# Patient Record
Sex: FEMALE | Race: WHITE | NOT HISPANIC OR LATINO | Employment: UNEMPLOYED | URBAN - METROPOLITAN AREA
[De-identification: names, ages, dates, MRNs, and addresses within clinical notes are randomized per-mention and may not be internally consistent; named-entity substitution may affect disease eponyms.]

---

## 2018-03-08 ENCOUNTER — OFFICE VISIT CONVERTED (OUTPATIENT)
Dept: FAMILY MEDICINE CLINIC | Facility: CLINIC | Age: 23
End: 2018-03-08
Attending: NURSE PRACTITIONER

## 2018-08-30 ENCOUNTER — OFFICE VISIT CONVERTED (OUTPATIENT)
Dept: FAMILY MEDICINE CLINIC | Facility: CLINIC | Age: 23
End: 2018-08-30
Attending: NURSE PRACTITIONER

## 2018-09-11 ENCOUNTER — OFFICE VISIT CONVERTED (OUTPATIENT)
Dept: ORTHOPEDIC SURGERY | Facility: CLINIC | Age: 23
End: 2018-09-11
Attending: ORTHOPAEDIC SURGERY

## 2018-09-28 ENCOUNTER — OFFICE VISIT CONVERTED (OUTPATIENT)
Dept: FAMILY MEDICINE CLINIC | Facility: CLINIC | Age: 23
End: 2018-09-28
Attending: NURSE PRACTITIONER

## 2018-10-11 ENCOUNTER — OFFICE VISIT CONVERTED (OUTPATIENT)
Dept: ORTHOPEDIC SURGERY | Facility: CLINIC | Age: 23
End: 2018-10-11
Attending: PHYSICIAN ASSISTANT

## 2018-10-30 ENCOUNTER — OFFICE VISIT CONVERTED (OUTPATIENT)
Dept: ORTHOPEDIC SURGERY | Facility: CLINIC | Age: 23
End: 2018-10-30
Attending: PHYSICIAN ASSISTANT

## 2018-11-13 ENCOUNTER — OFFICE VISIT CONVERTED (OUTPATIENT)
Dept: FAMILY MEDICINE CLINIC | Facility: CLINIC | Age: 23
End: 2018-11-13
Attending: NURSE PRACTITIONER

## 2018-11-27 ENCOUNTER — OFFICE VISIT CONVERTED (OUTPATIENT)
Dept: ORTHOPEDIC SURGERY | Facility: CLINIC | Age: 23
End: 2018-11-27
Attending: PHYSICIAN ASSISTANT

## 2018-12-14 ENCOUNTER — OFFICE VISIT CONVERTED (OUTPATIENT)
Dept: FAMILY MEDICINE CLINIC | Facility: CLINIC | Age: 23
End: 2018-12-14
Attending: NURSE PRACTITIONER

## 2018-12-14 ENCOUNTER — CONVERSION ENCOUNTER (OUTPATIENT)
Dept: FAMILY MEDICINE CLINIC | Facility: CLINIC | Age: 23
End: 2018-12-14

## 2019-02-21 ENCOUNTER — OFFICE VISIT CONVERTED (OUTPATIENT)
Dept: FAMILY MEDICINE CLINIC | Facility: CLINIC | Age: 24
End: 2019-02-21
Attending: NURSE PRACTITIONER

## 2019-08-17 ENCOUNTER — HOSPITAL ENCOUNTER (OUTPATIENT)
Dept: LABOR AND DELIVERY | Facility: HOSPITAL | Age: 24
Discharge: HOME | End: 2019-08-17
Attending: OBSTETRICS & GYNECOLOGY

## 2019-08-23 ENCOUNTER — HOSPITAL ENCOUNTER (OUTPATIENT)
Dept: LABOR AND DELIVERY | Facility: HOSPITAL | Age: 24
Discharge: HOME | End: 2019-08-23
Attending: OBSTETRICS & GYNECOLOGY

## 2019-08-23 LAB
ALBUMIN SERPL-MCNC: 3.5 G/DL (ref 3.5–5)
ALBUMIN/GLOB SERPL: 1.3 {RATIO} (ref 1.4–2.6)
ALP SERPL-CCNC: 94 U/L (ref 42–98)
ALT SERPL-CCNC: 15 U/L (ref 10–40)
ANION GAP SERPL CALC-SCNC: 15 MMOL/L (ref 8–19)
AST SERPL-CCNC: 19 U/L (ref 15–50)
BASOPHILS # BLD AUTO: 0.02 10*3/UL (ref 0–0.2)
BASOPHILS NFR BLD AUTO: 0.2 % (ref 0–3)
BILIRUB SERPL-MCNC: 0.43 MG/DL (ref 0.2–1.3)
BUN SERPL-MCNC: 9 MG/DL (ref 5–25)
BUN/CREAT SERPL: 15 {RATIO} (ref 6–20)
CALCIUM SERPL-MCNC: 9.3 MG/DL (ref 8.7–10.4)
CHLORIDE SERPL-SCNC: 103 MMOL/L (ref 99–111)
CONV ABS IMM GRAN: 0.06 10*3/UL (ref 0–0.2)
CONV CO2: 21 MMOL/L (ref 22–32)
CONV CREATININE URINE, RANDOM: 339.1 MG/DL (ref 10–300)
CONV IMMATURE GRAN: 0.6 % (ref 0–1.8)
CONV PROTEIN TO CREATININE RATIO (RANDOM URINE): 0.06 {RATIO} (ref 0–0.1)
CONV TOTAL PROTEIN: 6.1 G/DL (ref 6.3–8.2)
CREAT UR-MCNC: 0.59 MG/DL (ref 0.5–0.9)
DEPRECATED RDW RBC AUTO: 42.4 FL (ref 36.4–46.3)
EOSINOPHIL # BLD AUTO: 0.1 10*3/UL (ref 0–0.7)
EOSINOPHIL # BLD AUTO: 0.9 % (ref 0–7)
ERYTHROCYTE [DISTWIDTH] IN BLOOD BY AUTOMATED COUNT: 13 % (ref 11.7–14.4)
GFR SERPLBLD BASED ON 1.73 SQ M-ARVRAT: >60 ML/MIN/{1.73_M2}
GLOBULIN UR ELPH-MCNC: 2.6 G/DL (ref 2–3.5)
GLUCOSE SERPL-MCNC: 78 MG/DL (ref 65–99)
HCT VFR BLD AUTO: 34.9 % (ref 37–47)
HGB BLD-MCNC: 11.8 G/DL (ref 12–16)
LYMPHOCYTES # BLD AUTO: 2.05 10*3/UL (ref 1–5)
LYMPHOCYTES NFR BLD AUTO: 19.2 % (ref 20–45)
MCH RBC QN AUTO: 30.2 PG (ref 27–31)
MCHC RBC AUTO-ENTMCNC: 33.8 G/DL (ref 33–37)
MCV RBC AUTO: 89.3 FL (ref 81–99)
MONOCYTES # BLD AUTO: 0.67 10*3/UL (ref 0.2–1.2)
MONOCYTES NFR BLD AUTO: 6.3 % (ref 3–10)
NEUTROPHILS # BLD AUTO: 7.75 10*3/UL (ref 2–8)
NEUTROPHILS NFR BLD AUTO: 72.8 % (ref 30–85)
NRBC CBCN: 0 % (ref 0–0.7)
OSMOLALITY SERPL CALC.SUM OF ELEC: 278 MOSM/KG (ref 273–304)
PLATELET # BLD AUTO: 192 10*3/UL (ref 130–400)
PMV BLD AUTO: 10.4 FL (ref 9.4–12.3)
POTASSIUM SERPL-SCNC: 3.7 MMOL/L (ref 3.5–5.3)
PROT UR-MCNC: 20 MG/DL
RBC # BLD AUTO: 3.91 10*6/UL (ref 4.2–5.4)
SODIUM SERPL-SCNC: 135 MMOL/L (ref 135–147)
WBC # BLD AUTO: 10.65 10*3/UL (ref 4.8–10.8)

## 2019-09-16 ENCOUNTER — HOSPITAL ENCOUNTER (OUTPATIENT)
Dept: ULTRASOUND IMAGING | Facility: HOSPITAL | Age: 24
Discharge: HOME | End: 2019-09-16
Attending: OBSTETRICS & GYNECOLOGY

## 2020-01-30 ENCOUNTER — HOSPITAL ENCOUNTER (OUTPATIENT)
Dept: FAMILY MEDICINE CLINIC | Facility: CLINIC | Age: 25
Discharge: HOME | End: 2020-01-30
Attending: NURSE PRACTITIONER

## 2020-01-30 ENCOUNTER — OFFICE VISIT CONVERTED (OUTPATIENT)
Dept: FAMILY MEDICINE CLINIC | Facility: CLINIC | Age: 25
End: 2020-01-30
Attending: NURSE PRACTITIONER

## 2020-02-01 LAB — BACTERIA SPEC AEROBE CULT: NORMAL

## 2020-05-08 ENCOUNTER — OFFICE VISIT CONVERTED (OUTPATIENT)
Dept: FAMILY MEDICINE CLINIC | Facility: CLINIC | Age: 25
End: 2020-05-08
Attending: NURSE PRACTITIONER

## 2020-05-08 ENCOUNTER — CONVERSION ENCOUNTER (OUTPATIENT)
Dept: FAMILY MEDICINE CLINIC | Facility: CLINIC | Age: 25
End: 2020-05-08

## 2020-06-09 ENCOUNTER — OFFICE VISIT CONVERTED (OUTPATIENT)
Dept: ORTHOPEDIC SURGERY | Facility: CLINIC | Age: 25
End: 2020-06-09
Attending: ORTHOPAEDIC SURGERY

## 2020-06-27 ENCOUNTER — HOSPITAL ENCOUNTER (OUTPATIENT)
Dept: URGENT CARE | Facility: CLINIC | Age: 25
Discharge: HOME | End: 2020-06-27
Attending: NURSE PRACTITIONER

## 2020-09-15 ENCOUNTER — HOSPITAL ENCOUNTER (OUTPATIENT)
Dept: PERIOP | Facility: HOSPITAL | Age: 25
Setting detail: HOSPITAL OUTPATIENT SURGERY
Discharge: HOME | End: 2020-09-16
Attending: OBSTETRICS & GYNECOLOGY

## 2020-09-15 LAB
ABO GROUP BLD: NORMAL
ALBUMIN SERPL-MCNC: 4.8 G/DL (ref 3.5–5)
ALBUMIN/GLOB SERPL: 2 {RATIO} (ref 1.4–2.6)
ALP SERPL-CCNC: 70 U/L (ref 42–98)
ALT SERPL-CCNC: 16 U/L (ref 10–40)
ANION GAP SERPL CALC-SCNC: 19 MMOL/L (ref 8–19)
APPEARANCE UR: CLEAR
AST SERPL-CCNC: 18 U/L (ref 15–50)
BASOPHILS # BLD AUTO: 0.04 10*3/UL (ref 0–0.2)
BASOPHILS NFR BLD AUTO: 0.3 % (ref 0–3)
BILIRUB SERPL-MCNC: 0.9 MG/DL (ref 0.2–1.3)
BILIRUB UR QL: NEGATIVE
BLD GP AB SCN SERPL QL: NORMAL
BUN SERPL-MCNC: 11 MG/DL (ref 5–25)
BUN/CREAT SERPL: 19 {RATIO} (ref 6–20)
CALCIUM SERPL-MCNC: 9.8 MG/DL (ref 8.7–10.4)
CHLORIDE SERPL-SCNC: 102 MMOL/L (ref 99–111)
COLOR UR: YELLOW
CONV ABD CONTROL: NORMAL
CONV ABS IMM GRAN: 0.04 10*3/UL (ref 0–0.2)
CONV BACTERIA: NEGATIVE
CONV CO2: 22 MMOL/L (ref 22–32)
CONV COLLECTION SOURCE (UA): ABNORMAL
CONV HYALINE CASTS IN URINE MICRO: ABNORMAL /[LPF]
CONV IMMATURE GRAN: 0.3 % (ref 0–1.8)
CONV TOTAL PROTEIN: 7.2 G/DL (ref 6.3–8.2)
CONV UROBILINOGEN IN URINE BY AUTOMATED TEST STRIP: 1 {EHRLICHU}/DL (ref 0.1–1)
CREAT UR-MCNC: 0.58 MG/DL (ref 0.5–0.9)
DEPRECATED RDW RBC AUTO: 38 FL (ref 36.4–46.3)
EOSINOPHIL # BLD AUTO: 0.01 10*3/UL (ref 0–0.7)
EOSINOPHIL # BLD AUTO: 0.1 % (ref 0–7)
ERYTHROCYTE [DISTWIDTH] IN BLOOD BY AUTOMATED COUNT: 12.1 % (ref 11.7–14.4)
GFR SERPLBLD BASED ON 1.73 SQ M-ARVRAT: >60 ML/MIN/{1.73_M2}
GLOBULIN UR ELPH-MCNC: 2.4 G/DL (ref 2–3.5)
GLUCOSE SERPL-MCNC: 92 MG/DL (ref 65–99)
GLUCOSE UR QL: NEGATIVE MG/DL
HCG INTACT+B SERPL-ACNC: 681 M[IU]/ML (ref 0–5)
HCT VFR BLD AUTO: 36.4 % (ref 37–47)
HGB BLD-MCNC: 12.6 G/DL (ref 12–16)
HGB UR QL STRIP: ABNORMAL
KETONES UR QL STRIP: >80 MG/DL
LEUKOCYTE ESTERASE UR QL STRIP: NEGATIVE
LIPASE SERPL-CCNC: 14 U/L (ref 5–51)
LYMPHOCYTES # BLD AUTO: 2.5 10*3/UL (ref 1–5)
LYMPHOCYTES NFR BLD AUTO: 21.8 % (ref 20–45)
MCH RBC QN AUTO: 29.9 PG (ref 27–31)
MCHC RBC AUTO-ENTMCNC: 34.6 G/DL (ref 33–37)
MCV RBC AUTO: 86.5 FL (ref 81–99)
MONOCYTES # BLD AUTO: 0.88 10*3/UL (ref 0.2–1.2)
MONOCYTES NFR BLD AUTO: 7.7 % (ref 3–10)
NEUTROPHILS # BLD AUTO: 8 10*3/UL (ref 2–8)
NEUTROPHILS NFR BLD AUTO: 69.8 % (ref 30–85)
NITRITE UR QL STRIP: NEGATIVE
NRBC CBCN: 0 % (ref 0–0.7)
OSMOLALITY SERPL CALC.SUM OF ELEC: 287 MOSM/KG (ref 273–304)
PH UR STRIP.AUTO: 5.5 [PH] (ref 5–8)
PLATELET # BLD AUTO: 198 10*3/UL (ref 130–400)
PMV BLD AUTO: 10.6 FL (ref 9.4–12.3)
POTASSIUM SERPL-SCNC: 3.6 MMOL/L (ref 3.5–5.3)
PROT UR QL: NEGATIVE MG/DL
RBC # BLD AUTO: 4.21 10*6/UL (ref 4.2–5.4)
RBC #/AREA URNS HPF: ABNORMAL /[HPF]
RH BLD: NORMAL
SARS-COV-2 RNA SPEC QL NAA+PROBE: NOT DETECTED
SODIUM SERPL-SCNC: 139 MMOL/L (ref 135–147)
SP GR UR: 1.03 (ref 1–1.03)
WBC # BLD AUTO: 11.47 10*3/UL (ref 4.8–10.8)
WBC #/AREA URNS HPF: ABNORMAL /[HPF]

## 2021-03-19 LAB
EXTERNAL CHLAMYDIA SCREEN: NEGATIVE
EXTERNAL GONORRHEA SCREEN: NEGATIVE
EXTERNAL HEPATITIS B SURFACE ANTIGEN: NEGATIVE
EXTERNAL HEPATITIS C AB: NORMAL
EXTERNAL RUBELLA QUALITATIVE: NORMAL
EXTERNAL SYPHILIS ANTIBODY: NORMAL
EXTERNAL SYPHILIS RPR SCREEN: NEGATIVE
EXTERNAL VDRL: NORMAL
HIV1 P24 AG SERPL QL IA: NEGATIVE
VZV IGG SER QL: NORMAL

## 2021-04-02 ENCOUNTER — HOSPITAL ENCOUNTER (OUTPATIENT)
Dept: GENERAL RADIOLOGY | Facility: HOSPITAL | Age: 26
Discharge: HOME | End: 2021-04-02
Attending: ADVANCED PRACTICE MIDWIFE

## 2021-05-10 NOTE — H&P
History and Physical      Patient Name: Raysa Ellis   Patient ID: [id number]   Sex: Female   YOB: 1995    Primary Care Provider: Jeanie IRVIN    Visit Date: 2020    Provider: Lester Hu MD   Location: [facility]   Location Address: [address] [place] [address]  [id number]   Location Phone: [phone]          Chief Complaint  · Left Wrist Pain      History Of Present Illness  Raysa Ellis is a 24 year old /White female who presents today to [facility].      She's here for evaluation of left wrist pain, numbness, and tingling. Patient complains of palpable mass on dorsal aspect of left wrist. Patient states increased symptoms with holding 8 month old baby. Denies bracing. Patient has a history of left volar and dorsal wrist ganglion cyst excisions in 2018.       Past Medical History  ***No Significant Medical History; Migraine headache         Past Surgical History  I have had no surgeries         Medication List  Tylenol Arthritis Pain 650 mg oral tablet extended release; Voltaren 1 % topical gel         Allergy List  NO KNOWN DRUG ALLERGIES         Family Medical History  *No Known Family History         Reproductive History   1 Para 1 0 0 0       Social History  Alcohol (Never); Alcohol Use (Never); Claustophobic (Unknown); lives with spouse; Recreational Drug Use (Never); Single.; Tobacco (Current every day); Working         Immunizations  Name Date Admin   Influenza Refused         Review of Systems  · Constitutional  o Denies  o : fever, chills, weight loss  · Cardiovascular  o Denies  o : chest pain, shortness of breath  · Gastrointestinal  o Denies  o : liver disease, heartburn, nausea, blood in stools  · Genitourinary  o Denies  o : painful urination, blood in urine  · Integument  o Denies  o : rash, itching  · Neurologic  o Denies  o : headache, weakness, loss of consciousness  · Musculoskeletal  o Denies  o : painful, swollen  "joints  · Psychiatric  o Denies  o : drug/alcohol addiction, anxiety, depression      Vitals  Date Time BP Position Site L\R Cuff Size HR RR TEMP (F) WT  HT  BMI kg/m2 BSA m2 O2 Sat HC       06/09/2020 01:50 PM      81 - R   153lbs 0oz 5'  4\" 26.26 1.77 98 %          Physical Examination  · Constitutional  o Appearance  o : well developed, well-nourished, no obvious deformities present  · Head and Face  o Head  o :   § Inspection  § : normocephalic  o Face  o :   § Inspection  § : no facial lesions  · Eyes  o Conjunctivae  o : conjunctivae normal  o Sclerae  o : sclerae white  · Ears, Nose, Mouth and Throat  o Ears  o :   § External Ears  § : appearance within normal limits  § Hearing  § : intact  o Nose  o :   § External Nose  § : appearance normal  · Neck  o Inspection/Palpation  o : normal appearance  o Range of Motion  o : full range of motion  · Respiratory  o Respiratory Effort  o : breathing unlabored  o Inspection of Chest  o : normal appearance  o Auscultation of Lungs  o : no audible wheezing or rales  · Cardiovascular  o Heart  o : regular rate  · Gastrointestinal  o Abdominal Examination  o : soft and non-tender  · Skin and Subcutaneous Tissue  o General Inspection  o : intact, no rashes  · Psychiatric  o General  o : Alert and oriented x3  o Judgement and Insight  o : judgment and insight intact  o Mood and Affect  o : mood normal, affect appropriate  · Left Wrist  o Inspection  o : Fullness in dorsal wrist. Full digit ROM. Full wrist ROM. Neurovascularly intact. Sensation grossly intact. Pulses normal.           Assessment  · Left wrist pain     719.43/M25.532  Left dorsal wrist ganglion cyst      Plan  · Medications  o Medications have been Reconciled  o Transition of Care or Provider Policy  · Instructions  o Reviewed the patient's Past Medical, Social, and Family history as well as the ROS at today's visit, no changes.  o Call or return if worsening symptoms.  o Follow Up PRN.  o The above service " was scribed by Fela Wells on my behalf and I attest to the accuracy of the note. mc  o The plan is bracing and a prescription of Voltaren Gel. If failing to improve, may further discuss excision of left wrist mass.             Electronically Signed by: Sofi Wells - , Other -Author on June 9, 2020 03:07:35 PM  Electronically Co-signed by: Lester Hu MD -Reviewer on June 11, 2020 02:44:36 PM

## 2021-05-13 NOTE — PROGRESS NOTES
Progress Note      Patient Name: Raysa Ellis   Patient ID: [id number]   Sex: Female   YOB: 1995    Primary Care Provider: Jeanie IRVIN    Visit Date: May 8, 2020    Provider: ALBARO Berg   Location: [facility]   Location Address: [address] [place] [address], [address]  [id number]   Location Phone: [phone]          Chief Complaint  · Low back pain      History Of Present Illness  Raysa Ellis is a 24 year old /White female who presents for evaluation and treatment of:      She is a 24-year-old female who comes in today with complaints of low back pain.  Waxes and wanes in intensity.  She states that she is been having issues with her low back for the last several years no known injury.  She states she has been taken Tylenol Motrin as needed.  She states that she feels like the Tylenol has been working better.  She denies any weakness in her lower extremities.  Denies any bowel bladder incontinence.  She has never had any imaging done on her lower back.       Past Medical History  Disease Name Date Onset Notes   ***No Significant Medical History --  --    Migraine headache --  --          Past Surgical History  Procedure Name Date Notes   I have had no surgeries --  --          Allergy List  Allergen Name Date Reaction Notes   NO KNOWN DRUG ALLERGIES --  --  --        Allergies Reconciled  Family Medical History  Disease Name Relative/Age Notes   *No Known Family History  --          Reproductive History  Menstrual   Last Menstrual Period: 2019   Pregnancy Summary   Total Pregnancies: 1 Full Term: 1 Premature: 0   Ab Induced: 0 Ab Spontaneous: 0 Ectopics: 0   Multiples: 0 Livin         Social History  Finding Status Start/Stop Quantity Notes   Alcohol Never --/-- --  --    Alcohol Use Never --/-- --  does not drink   Claustophobic Unknown --/-- --  yes   lives with spouse --  --/-- --  --    Recreational Drug Use Never --/-- --  no   Tobacco  "Current every day --/-- 0.5 PPD current every day smoker, 0.5 pack per day, smoked 5 years  1pack every 2-3 days   Working --  --/-- --  --          Immunizations  NameDate Admin Mfg Trade Name Lot Number Route Inj VIS Given VIS Publication   InfluenzaRefused 01/30/2020 NE Not Entered  NE NE     Comments:          Review of Systems  · Constitutional  o Denies  o : fever, fatigue, weight loss, weight gain  · Cardiovascular  o Denies  o : lower extremity edema, claudication, chest pressure, palpitations  · Respiratory  o Denies  o : shortness of breath, wheezing, cough, hemoptysis, dyspnea on exertion  · Gastrointestinal  o Denies  o : nausea, vomiting, diarrhea, constipation, abdominal pain  · Genitourinary  o Denies  o : incontinence, urinary hesitancy, decreased stream  · Musculoskeletal  o Admits  o : muscle pain, back pain  o Denies  o : joint pain, joint swelling, muscle cramps      Vitals  Date Time BP Position Site L\R Cuff Size HR RR TEMP (F) WT  HT  BMI kg/m2 BSA m2 O2 Sat        05/08/2020 01:14 /64 Sitting    104 - R  98.2 151lbs 0oz 5'  4\" 25.92 1.76 99 %          Physical Examination  · Constitutional  o Appearance  o : well-nourished, well developed, in no acute distress  · Eyes  o Conjunctivae  o : conjunctivae normal, no exudates present  o Sclerae  o : sclerae white  o Pupils and Irises  o : pupils equal and round, and reactive to light and accomodation bilaterally  o Eyelids/Ocular Adnexae  o : extra ocular movements intact  · Respiratory  o Respiratory Effort  o : breathing unlabored, no accessory muscle use  o Inspection of Chest  o : normal appearance, no retractions  o Auscultation of Lungs  o : normal breath sounds bilaterally  · Cardiovascular  o Heart  o :   § Auscultation of Heart  § : regular rate and rhythm, no murmurs, gallops or rubs  o Peripheral Vascular System  o :   § Extremities  § : no edema  · Musculoskeletal  o Spine  o :   § Inspection/Palpation  § : Mild midline " tenderness in the L-spine. No scoliosis or kyphosis present  · Neurologic  o Mental Status Examination  o :   § Orientation  § : alert and oriented x3  § Speech/Language  § : normal speech pattern  o Gait and Station  o : normal gait, able to stand without difficulty              Assessment  · Lumbago     724.2/M54.5  Will treat with Tylenol arthritis, and have patient follow-up with chiropractic care to see if it does not help her back more long-term. Discussed return precautions. Patient verbalized understanding is agreeable treatment plan. Will get x-ray of back to rule out any acute bony abnormality.    Problems Reconciled  Plan  · Orders  o Lumbar Spine Complete [facility] ([id number]) - 724.2/M54.5 - 05/08/2020  o ACO-39: Current medications updated and reviewed () - - 05/08/2020  o Chiropractor Consultation (CHIRO) - 724.2/M54.5 - 05/08/2020   Dr. Delgado in [place]  · Medications  o Tylenol Arthritis Pain 650 mg oral tablet extended release   SIG: take 2 tablets (1,300 mg) by oral route every 8 hours swallowing whole with water. Do not break, crush, dissolve   DISP: (90) tablets with 1 refills  Prescribed on 05/08/2020     o Medications have been Reconciled  o Transition of Care or Provider Policy  · Instructions  o Take all medications as prescribed/directed.  o Patient was educated/instructed on their diagnosis, treatment and medications prior to discharge from the clinic today.  o Call the office with any concerns or questions.  · Disposition  o Call or Return if symptoms worsen or persist.  o follow up as needed  o call the office with any questions or concerns            Electronically Signed by: ALBARO Berg -Author on May 8, 2020 01:29:52 PM

## 2021-05-15 VITALS
HEIGHT: 63 IN | OXYGEN SATURATION: 96 % | BODY MASS INDEX: 24.7 KG/M2 | WEIGHT: 139.37 LBS | DIASTOLIC BLOOD PRESSURE: 70 MMHG | TEMPERATURE: 102.3 F | SYSTOLIC BLOOD PRESSURE: 120 MMHG | HEART RATE: 250 BPM

## 2021-05-15 VITALS — WEIGHT: 153 LBS | HEART RATE: 81 BPM | BODY MASS INDEX: 26.12 KG/M2 | OXYGEN SATURATION: 98 % | HEIGHT: 64 IN

## 2021-05-15 VITALS
TEMPERATURE: 98.2 F | DIASTOLIC BLOOD PRESSURE: 64 MMHG | HEART RATE: 104 BPM | BODY MASS INDEX: 25.78 KG/M2 | WEIGHT: 151 LBS | OXYGEN SATURATION: 99 % | SYSTOLIC BLOOD PRESSURE: 102 MMHG | HEIGHT: 64 IN

## 2021-05-15 VITALS
HEART RATE: 105 BPM | WEIGHT: 140 LBS | HEIGHT: 63 IN | SYSTOLIC BLOOD PRESSURE: 112 MMHG | OXYGEN SATURATION: 98 % | DIASTOLIC BLOOD PRESSURE: 54 MMHG | TEMPERATURE: 99 F | BODY MASS INDEX: 24.8 KG/M2

## 2021-05-15 VITALS
HEIGHT: 63 IN | BODY MASS INDEX: 24.63 KG/M2 | SYSTOLIC BLOOD PRESSURE: 120 MMHG | WEIGHT: 139 LBS | OXYGEN SATURATION: 98 % | HEART RATE: 90 BPM | TEMPERATURE: 98.9 F | DIASTOLIC BLOOD PRESSURE: 72 MMHG

## 2021-05-16 VITALS — OXYGEN SATURATION: 98 % | WEIGHT: 136.12 LBS | HEART RATE: 107 BPM | HEIGHT: 63 IN | BODY MASS INDEX: 24.12 KG/M2

## 2021-05-16 VITALS
OXYGEN SATURATION: 98 % | DIASTOLIC BLOOD PRESSURE: 68 MMHG | SYSTOLIC BLOOD PRESSURE: 108 MMHG | TEMPERATURE: 97.9 F | HEIGHT: 63 IN | HEART RATE: 94 BPM | BODY MASS INDEX: 24.27 KG/M2 | WEIGHT: 137 LBS

## 2021-05-16 VITALS — OXYGEN SATURATION: 98 % | BODY MASS INDEX: 24.65 KG/M2 | HEIGHT: 63 IN | HEART RATE: 92 BPM | WEIGHT: 139.12 LBS

## 2021-05-16 VITALS — BODY MASS INDEX: 24.14 KG/M2 | WEIGHT: 136.25 LBS | HEIGHT: 63 IN | OXYGEN SATURATION: 98 % | HEART RATE: 88 BPM

## 2021-05-16 VITALS
HEIGHT: 63 IN | BODY MASS INDEX: 24.32 KG/M2 | HEART RATE: 76 BPM | DIASTOLIC BLOOD PRESSURE: 70 MMHG | SYSTOLIC BLOOD PRESSURE: 122 MMHG | OXYGEN SATURATION: 97 % | TEMPERATURE: 98.3 F | WEIGHT: 137.25 LBS

## 2021-05-16 VITALS
HEART RATE: 94 BPM | SYSTOLIC BLOOD PRESSURE: 117 MMHG | HEIGHT: 64 IN | WEIGHT: 142.5 LBS | DIASTOLIC BLOOD PRESSURE: 61 MMHG | BODY MASS INDEX: 24.33 KG/M2 | OXYGEN SATURATION: 100 % | TEMPERATURE: 99.1 F

## 2021-05-16 VITALS
BODY MASS INDEX: 24.34 KG/M2 | TEMPERATURE: 98.5 F | DIASTOLIC BLOOD PRESSURE: 68 MMHG | SYSTOLIC BLOOD PRESSURE: 110 MMHG | OXYGEN SATURATION: 96 % | HEART RATE: 88 BPM | HEIGHT: 63 IN | WEIGHT: 137.37 LBS

## 2021-05-16 VITALS — BODY MASS INDEX: 24.27 KG/M2 | HEIGHT: 63 IN | WEIGHT: 137 LBS

## 2021-09-14 PROBLEM — Z34.80 SUPERVISION OF OTHER NORMAL PREGNANCY, ANTEPARTUM: Status: ACTIVE | Noted: 2021-09-14

## 2021-09-17 ENCOUNTER — ROUTINE PRENATAL (OUTPATIENT)
Dept: OBSTETRICS AND GYNECOLOGY | Facility: CLINIC | Age: 26
End: 2021-09-17

## 2021-09-17 VITALS — DIASTOLIC BLOOD PRESSURE: 70 MMHG | SYSTOLIC BLOOD PRESSURE: 112 MMHG | WEIGHT: 150 LBS | BODY MASS INDEX: 25.75 KG/M2

## 2021-09-17 DIAGNOSIS — Z34.80 SUPERVISION OF OTHER NORMAL PREGNANCY: Primary | ICD-10-CM

## 2021-09-17 PROBLEM — Z98.891 HX OF CESAREAN SECTION: Status: ACTIVE | Noted: 2021-09-17

## 2021-09-17 LAB
GLUCOSE UR STRIP-MCNC: ABNORMAL MG/DL
PROT UR STRIP-MCNC: NEGATIVE MG/DL

## 2021-09-17 PROCEDURE — 99212 OFFICE O/P EST SF 10 MIN: CPT | Performed by: STUDENT IN AN ORGANIZED HEALTH CARE EDUCATION/TRAINING PROGRAM

## 2021-09-17 RX ORDER — PRENATAL VIT/IRON FUM/FOLIC AC 27MG-0.8MG
1 TABLET ORAL DAILY
COMMUNITY
End: 2022-01-05

## 2021-09-17 NOTE — PROGRESS NOTES
OB FOLLOW UP  Complaint   Chief Complaint   Patient presents with   • Routine Prenatal Visit            Raysa Ellis is a 26 y.o.  33w3d patient being seen today for her obstetrical follow up visit. Patient denies decreased fetal movement, contractions, loss of fluid or vaginal bleeding.  No other acute complaints.    Her prenatal care is complicated by (and status) :    Patient Active Problem List   Diagnosis   • Supervision of other normal pregnancy, antepartum   • Hx of  section       All other systems reviewed and are negative.     The additional following portions of the patient's history were reviewed and updated as appropriate: allergies, current medications, past family history, past medical history, past social history, past surgical history and problem list.      EXAM:     Vital signs: /70   Wt 68 kg (150 lb)   LMP 2021   BMI 25.75 kg/m²   Appearance/psychiatric: To be in no distress  Constitutional: The patient is well nourished.  Cardiovascular: She does not have edema.  Respiratory: Respiratory effort is normal.  Gastrointestinal: Abdomen is soft, gravid, nontender, no rashes, heart tones are present, fundal height is size equals dates    Pelvic Exam: No    Urine glucose/protein: See prenatal flowsheet       Assessment and Plan    Problem List Items Addressed This Visit     None      Visit Diagnoses     Supervision of other normal pregnancy    -  Primary    Relevant Orders    POC Urinalysis Dipstick (Completed)        Impression  1. Pregnancy at 33w3d  2. History of  section.   3. Fetal status reassuring.   4. Activity and Exercise discussed.    Plan  1.  Labor precautions discussed with patient.  Including vaginal bleeding, loss of fluid, contractions and decreased fetal movement.   2.  Patient desires to .  Recommend induction between 39 and 40 weeks  3.  Follow-up in 2 weeks        Eliot Gupta MD  2021

## 2021-10-07 ENCOUNTER — TELEPHONE (OUTPATIENT)
Dept: OBSTETRICS AND GYNECOLOGY | Facility: CLINIC | Age: 26
End: 2021-10-07

## 2021-10-07 ENCOUNTER — ROUTINE PRENATAL (OUTPATIENT)
Dept: OBSTETRICS AND GYNECOLOGY | Facility: CLINIC | Age: 26
End: 2021-10-07

## 2021-10-07 VITALS — SYSTOLIC BLOOD PRESSURE: 114 MMHG | DIASTOLIC BLOOD PRESSURE: 78 MMHG | BODY MASS INDEX: 26.61 KG/M2 | WEIGHT: 155 LBS

## 2021-10-07 DIAGNOSIS — I83.93 ASYMPTOMATIC VARICOSE VEINS OF BOTH LOWER EXTREMITIES: ICD-10-CM

## 2021-10-07 DIAGNOSIS — Z98.891 HX OF CESAREAN SECTION: ICD-10-CM

## 2021-10-07 DIAGNOSIS — Z34.80 SUPERVISION OF OTHER NORMAL PREGNANCY, ANTEPARTUM: Primary | ICD-10-CM

## 2021-10-07 LAB
GLUCOSE UR STRIP-MCNC: NEGATIVE MG/DL
PROT UR STRIP-MCNC: NEGATIVE MG/DL

## 2021-10-07 PROCEDURE — 99213 OFFICE O/P EST LOW 20 MIN: CPT | Performed by: STUDENT IN AN ORGANIZED HEALTH CARE EDUCATION/TRAINING PROGRAM

## 2021-10-07 PROCEDURE — 87081 CULTURE SCREEN ONLY: CPT | Performed by: STUDENT IN AN ORGANIZED HEALTH CARE EDUCATION/TRAINING PROGRAM

## 2021-10-07 NOTE — PROGRESS NOTES
OB FOLLOW UP  Complaint   Chief Complaint   Patient presents with   • Routine Prenatal Visit            Raysa Ellis is a 26 y.o.  36w2d patient being seen today for her obstetrical follow up visit. Patient denies decreased fetal movement, contractions, loss of fluid or vaginal bleeding.  No other acute complaints.  Has been wearing compression stockings for varicose veins with some improvement.  Denies any pain in legs.  She desires a trial of labor after .  Indication for  previously was fetal intolerance.    Her prenatal care is complicated by (and status) :    Patient Active Problem List   Diagnosis   • Supervision of other normal pregnancy, antepartum   • Hx of  section   • Asymptomatic varicose veins of both lower extremities       All other systems reviewed and are negative.     The additional following portions of the patient's history were reviewed and updated as appropriate: allergies, current medications, past family history, past medical history, past social history, past surgical history and problem list.      EXAM:     Vital signs: /78   Wt 70.3 kg (155 lb)   LMP 2021   BMI 26.61 kg/m²   Appearance/psychiatric: To be in no distress  Constitutional: The patient is well nourished.  Cardiovascular: She does not have edema.  Respiratory: Respiratory effort is normal.  Gastrointestinal: Abdomen is soft, gravid, nontender, no rashes, heart tones are present, fundal height is size equals dates    Pelvic Exam: Yes.  Presentation: cephalic. Dilation: 1cm. Effacement: 60%. Station: -3.    Urine glucose/protein: See prenatal flowsheet       Assessment and Plan    Problem List Items Addressed This Visit        Cardiac and Vasculature    Asymptomatic varicose veins of both lower extremities       Gravid and     Hx of  section    Supervision of other normal pregnancy, antepartum - Primary    Overview     Blood type O+  PNL normal  PAP NILM 3/19  GCCT  neg    Genetic testing:    Quad screen negative    Vaccinations  COVID:  Does not plan to get vaccinated.   Influenza:     24-28 weeks  1hr GCT failed, passed 3 hour  CBC 12.6/38.1  Tdap: Has the Rx for this, plans to do weekend of       28 weeks beyond  GBS    Ultrasound:  Dating:   Anatomy:   Follow up:       PLAN  2021 Desires to .  Recommend induction at 39 to 40 weeks.  Patient previously counseled to  continue to discuss at next appointment.                     Relevant Orders    POC Urinalysis Dipstick (Completed)    Group B Streptococcus Culture - Swab, Vaginal/Rectum          Impression  1. Pregnancy at 36w2d  2. Fetal status reassuring.   3. Activity and Exercise discussed.  4. Varicose veins  5. History of  section    Plan  1.  GBS collected today  2.  Induction of labor at 39 6, with desire for trial labor after   3.  Continue with compressions hoses to help with varicose veins.  4. Follow-up 1 week      Patient was counseled to the following pregnancy precautions:  • Decreased fetal movement, if concern for decreased fetal movement please perform fetal kick counts you are looking for 10 movements in 2 hours.  If concern for fetal movement and not meeting that criteria, please present to triage for evaluation.  • Contractions occurring every 5 minutes for over an hour, lasting 30 to 60 seconds and progressively causing more discomfort, please seek medical attention to rule out labor  • If you believe that your water is broken, place a sanitary pad.  If pad fills in short period of time i.e. less than 5 minutes, take off pad placed another pad.  If this is saturated please present for rule out rupture of membranes  • Vaginal bleeding can be normal in pregnancy, this usually takes a form of spotting.  If having heavier bleeding like a menstrual period please present for evaluation; especially in light of severe abdominal pain this could represent a placental  abruption.  • Keep all scheduled appointments as recommended.        Eliot Gupta MD  10/07/2021

## 2021-10-07 NOTE — TELEPHONE ENCOUNTER
Patient has called back after we had her scheduled for IOL after  w/Dr. Gupta on 21.  Patient states she cannot do it on 21 and is requesting 21.  Spoke w/ Dr. WINTERS and he asked that she have her next appt w/ physician on call on 21 to talk about this/patient does not have a VM account set up./mary

## 2021-10-09 LAB — BACTERIA SPEC AEROBE CULT: ABNORMAL

## 2021-10-14 ENCOUNTER — TELEPHONE (OUTPATIENT)
Dept: OBSTETRICS AND GYNECOLOGY | Facility: CLINIC | Age: 26
End: 2021-10-14

## 2021-10-15 ENCOUNTER — TELEPHONE (OUTPATIENT)
Dept: OBSTETRICS AND GYNECOLOGY | Facility: CLINIC | Age: 26
End: 2021-10-15

## 2021-10-15 ENCOUNTER — ROUTINE PRENATAL (OUTPATIENT)
Dept: OBSTETRICS AND GYNECOLOGY | Facility: CLINIC | Age: 26
End: 2021-10-15

## 2021-10-15 VITALS — DIASTOLIC BLOOD PRESSURE: 74 MMHG | BODY MASS INDEX: 26.43 KG/M2 | SYSTOLIC BLOOD PRESSURE: 110 MMHG | WEIGHT: 154 LBS

## 2021-10-15 DIAGNOSIS — Z34.80 SUPERVISION OF OTHER NORMAL PREGNANCY, ANTEPARTUM: Primary | ICD-10-CM

## 2021-10-15 DIAGNOSIS — Z98.891 HX OF CESAREAN SECTION: ICD-10-CM

## 2021-10-15 DIAGNOSIS — I83.93 ASYMPTOMATIC VARICOSE VEINS OF BOTH LOWER EXTREMITIES: ICD-10-CM

## 2021-10-15 LAB
GLUCOSE UR STRIP-MCNC: NEGATIVE MG/DL
PROT UR STRIP-MCNC: NEGATIVE MG/DL

## 2021-10-15 PROCEDURE — 99212 OFFICE O/P EST SF 10 MIN: CPT | Performed by: ADVANCED PRACTICE MIDWIFE

## 2021-10-15 NOTE — PROGRESS NOTES
OB FOLLOW UP  CC- Here for care of pregnancy        Raysa Ellis is a 26 y.o.  37w3d patient being seen today for her obstetrical follow up visit. Patient reports no complaints.     Her prenatal care is complicated by (and status) :    Patient Active Problem List   Diagnosis   • Supervision of other normal pregnancy, antepartum   • Hx of  section   • Asymptomatic varicose veins of both lower extremities       ROS -   Patient Reports : No Problems  Patient Denies: Loss of Fluid, Vaginal Spotting and Contractions  Fetal Movement : normal  All other systems reviewed and are negative.       The additional following portions of the patient's history were reviewed and updated as appropriate: allergies, current medications, past family history, past medical history, past social history, past surgical history and problem list.    I have reviewed and agree with the HPI, ROS, and historical information as entered above. Naty Jackson CNM    /74   Wt 69.9 kg (154 lb)   LMP 2021   BMI 26.43 kg/m²       EXAM:     FHT: 150 BPM   Uterine Size: 36 cm  Pelvic Exam: Yes.  Presentation: cephalic. Dilation: 1cm. Effacement: 80. Station: -1.    Urine glucose/protein: See prenatal flowsheet       Assessment and Plan    Problem List Items Addressed This Visit        Cardiac and Vasculature    Asymptomatic varicose veins of both lower extremities       Gravid and     Supervision of other normal pregnancy, antepartum - Primary    Overview     Blood type O+  PNL normal  PAP NILM 3/19  GCCT neg    Genetic testing:    Quad screen negative    Vaccinations  COVID:  Does not plan to get vaccinated.   Influenza:     24-28 weeks  1hr GCT failed, passed 3 hour  CBC 12.6/38.1  Tdap: Has the Rx for this, plans to do weekend of       28 weeks beyond  GBS - positive 10/7/2021    Ultrasound:  Dating:   Anatomy:   Follow up:       PLAN  2021 Desires to .  Recommend induction at 39 to 40 weeks.   Patient previously counseled to  continue to discuss at next appointment.                     Relevant Orders    POC Urinalysis Dipstick (Completed)    Hx of  section          1. Pregnancy at 37w3d  2. Fetal status reassuring.   3. GBS +  4. Discussed fetal kick counts  5. Return in about 1 week (around 10/22/2021) for Next scheduled follow up.    Naty Jackson CNM  10/15/2021

## 2021-10-22 ENCOUNTER — ROUTINE PRENATAL (OUTPATIENT)
Dept: OBSTETRICS AND GYNECOLOGY | Facility: CLINIC | Age: 26
End: 2021-10-22

## 2021-10-22 ENCOUNTER — PREP FOR SURGERY (OUTPATIENT)
Dept: OTHER | Facility: HOSPITAL | Age: 26
End: 2021-10-22

## 2021-10-22 VITALS — WEIGHT: 156 LBS | DIASTOLIC BLOOD PRESSURE: 77 MMHG | BODY MASS INDEX: 26.78 KG/M2 | SYSTOLIC BLOOD PRESSURE: 112 MMHG

## 2021-10-22 DIAGNOSIS — Z01.818 PRE-OP EVALUATION: Primary | ICD-10-CM

## 2021-10-22 DIAGNOSIS — I83.93 ASYMPTOMATIC VARICOSE VEINS OF BOTH LOWER EXTREMITIES: ICD-10-CM

## 2021-10-22 DIAGNOSIS — Z34.80 SUPERVISION OF OTHER NORMAL PREGNANCY, ANTEPARTUM: ICD-10-CM

## 2021-10-22 DIAGNOSIS — Z98.891 HX OF CESAREAN SECTION: ICD-10-CM

## 2021-10-22 PROBLEM — Z3A.38 38 WEEKS GESTATION OF PREGNANCY: Status: ACTIVE | Noted: 2021-10-22

## 2021-10-22 LAB
GLUCOSE UR STRIP-MCNC: NEGATIVE MG/DL
PROT UR STRIP-MCNC: NEGATIVE MG/DL

## 2021-10-22 PROCEDURE — 99213 OFFICE O/P EST LOW 20 MIN: CPT | Performed by: OBSTETRICS & GYNECOLOGY

## 2021-10-22 NOTE — PROGRESS NOTES
OB FOLLOW UP    CC: Scheduled OB routine FU       Prenatal care complicated by:   Patient Active Problem List   Diagnosis   • Supervision of other normal pregnancy, antepartum   • Hx of  section   • Asymptomatic varicose veins of both lower extremities   • 38 weeks gestation of pregnancy       Subjective:   Patient has: No complaints, No leaking fluid, No vaginal bleeding, No contractions, Adequate FM    Objective:  Urine glucose/protein- see flow sheet      /77   Wt 70.8 kg (156 lb)   LMP 2021   BMI 26.78 kg/m²   See OB flow for LE edema, and cvx exam if applicable  FHT: 138 BPM   Uterine Size: 36.5 cm      Assessment and Plan:  Diagnoses and all orders for this visit:    1. Asymptomatic varicose veins of both lower extremities  Assessment & Plan:  Recommended ANTONIETA hose      2. Hx of  section  Assessment & Plan:  The patient desires TOLAC.  The risks, benefits, alternatives have been reviewed.  Undelivered she is scheduled for induction of labor with Dr. Horne on 2021.      3. Supervision of other normal pregnancy, antepartum  Overview:  Blood type O+  PNL normal  PAP NILM 3/19  GCCT neg    Genetic testing:    Quad screen negative    Vaccinations  COVID:  Does not plan to get vaccinated.   Influenza:     24-28 weeks  1hr GCT failed, passed 3 hour  CBC 12.6/38.1  Tdap: Has the Rx for this, plans to do weekend of       28 weeks beyond  GBS - positive 10/7/2021    Ultrasound:  Dating:   Anatomy:   Follow up:       PLAN  2021 Desires to .  Recommend induction at 39 to 40 weeks.  Patient previously counseled to  continue to discuss at next appointment.                Assessment & Plan:  Continue prenatal vitamins  Fetal kick counts  Labor warnings    Orders:  -     POC Urinalysis Dipstick        38w3d  Reassuring pregnancy progress    Counseling: OB precautions, leaking, VB, deanne frausto vs PTL/Labor  FKC    Questions answered    Return in about 1 week (around  10/29/2021).      Grupo Sousa MD  10/22/2021

## 2021-10-22 NOTE — ASSESSMENT & PLAN NOTE
The patient desires TOLAC.  The risks, benefits, alternatives have been reviewed.  Undelivered she is scheduled for induction of labor with Dr. Horne on 11/1/2021.

## 2021-10-27 ENCOUNTER — LAB (OUTPATIENT)
Dept: LAB | Facility: HOSPITAL | Age: 26
End: 2021-10-27

## 2021-10-27 PROCEDURE — 87635 SARS-COV-2 COVID-19 AMP PRB: CPT | Performed by: STUDENT IN AN ORGANIZED HEALTH CARE EDUCATION/TRAINING PROGRAM

## 2021-10-28 LAB — SARS-COV-2 N GENE RESP QL NAA+PROBE: NOT DETECTED

## 2021-10-29 ENCOUNTER — HOSPITAL ENCOUNTER (INPATIENT)
Facility: HOSPITAL | Age: 26
LOS: 1 days | Discharge: HOME | End: 2021-10-30
Attending: OBSTETRICS & GYNECOLOGY | Admitting: OBSTETRICS & GYNECOLOGY

## 2021-10-29 ENCOUNTER — ANESTHESIA (OUTPATIENT)
Dept: LABOR AND DELIVERY | Facility: HOSPITAL | Age: 26
End: 2021-10-29

## 2021-10-29 ENCOUNTER — ANESTHESIA EVENT (OUTPATIENT)
Dept: LABOR AND DELIVERY | Facility: HOSPITAL | Age: 26
End: 2021-10-29

## 2021-10-29 PROBLEM — G43.909 MIGRAINE HEADACHE: Status: ACTIVE | Noted: 2021-10-29

## 2021-10-29 PROBLEM — Z34.80 SUPERVISION OF OTHER NORMAL PREGNANCY, ANTEPARTUM: Status: RESOLVED | Noted: 2021-09-14 | Resolved: 2021-10-29

## 2021-10-29 PROBLEM — Z37.9 NORMAL LABOR: Status: ACTIVE | Noted: 2021-10-29

## 2021-10-29 PROBLEM — Z3A.39 39 WEEKS GESTATION OF PREGNANCY: Status: ACTIVE | Noted: 2021-10-22

## 2021-10-29 LAB
ABO GROUP BLD: NORMAL
ABO GROUP BLD: NORMAL
BASE EXCESS BLDCOA CALC-SCNC: -1.4 MMOL/L
BASE EXCESS BLDCOV CALC-SCNC: -0.7 MMOL/L
BASOPHILS # BLD AUTO: 0.03 10*3/MM3 (ref 0–0.2)
BASOPHILS NFR BLD AUTO: 0.3 % (ref 0–1.5)
BLD GP AB SCN SERPL QL: NEGATIVE
DEPRECATED RDW RBC AUTO: 39.1 FL (ref 37–54)
EOSINOPHIL # BLD AUTO: 0.11 10*3/MM3 (ref 0–0.4)
EOSINOPHIL NFR BLD AUTO: 1.1 % (ref 0.3–6.2)
ERYTHROCYTE [DISTWIDTH] IN BLOOD BY AUTOMATED COUNT: 12.8 % (ref 12.3–15.4)
HCO3 BLDCOA-SCNC: 24.5 MMOL/L
HCO3 BLDCOV-SCNC: 25.5 MMOL/L
HCT VFR BLD AUTO: 35.8 % (ref 34–46.6)
HGB BLD-MCNC: 12.3 G/DL (ref 12–15.9)
IMM GRANULOCYTES # BLD AUTO: 0.06 10*3/MM3 (ref 0–0.05)
IMM GRANULOCYTES NFR BLD AUTO: 0.6 % (ref 0–0.5)
LYMPHOCYTES # BLD AUTO: 2.14 10*3/MM3 (ref 0.7–3.1)
LYMPHOCYTES NFR BLD AUTO: 20.5 % (ref 19.6–45.3)
MCH RBC QN AUTO: 29.3 PG (ref 26.6–33)
MCHC RBC AUTO-ENTMCNC: 34.4 G/DL (ref 31.5–35.7)
MCV RBC AUTO: 85.2 FL (ref 79–97)
MONOCYTES # BLD AUTO: 0.72 10*3/MM3 (ref 0.1–0.9)
MONOCYTES NFR BLD AUTO: 6.9 % (ref 5–12)
NEUTROPHILS NFR BLD AUTO: 7.38 10*3/MM3 (ref 1.7–7)
NEUTROPHILS NFR BLD AUTO: 70.6 % (ref 42.7–76)
NRBC BLD AUTO-RTO: 0 /100 WBC (ref 0–0.2)
PCO2 BLDCOA: 45.6 MMHG (ref 33–49)
PCO2 BLDCOV: 47.5 MM HG (ref 28–40)
PH BLDCOA: 7.35 PH UNITS (ref 7.21–7.31)
PH BLDCOV: 7.35 PH UNITS (ref 7.31–7.37)
PLATELET # BLD AUTO: 190 10*3/MM3 (ref 140–450)
PMV BLD AUTO: 11 FL (ref 6–12)
PO2 BLDCOA: <40.5 MMHG
PO2 BLDCOV: <40.5 MM HG (ref 21–31)
RBC # BLD AUTO: 4.2 10*6/MM3 (ref 3.77–5.28)
RH BLD: POSITIVE
RH BLD: POSITIVE
T&S EXPIRATION DATE: NORMAL
WBC # BLD AUTO: 10.44 10*3/MM3 (ref 3.4–10.8)

## 2021-10-29 PROCEDURE — S0260 H&P FOR SURGERY: HCPCS | Performed by: OBSTETRICS & GYNECOLOGY

## 2021-10-29 PROCEDURE — 86900 BLOOD TYPING SEROLOGIC ABO: CPT

## 2021-10-29 PROCEDURE — 86850 RBC ANTIBODY SCREEN: CPT | Performed by: OBSTETRICS & GYNECOLOGY

## 2021-10-29 PROCEDURE — 86901 BLOOD TYPING SEROLOGIC RH(D): CPT | Performed by: OBSTETRICS & GYNECOLOGY

## 2021-10-29 PROCEDURE — 82803 BLOOD GASES ANY COMBINATION: CPT | Performed by: OBSTETRICS & GYNECOLOGY

## 2021-10-29 PROCEDURE — 25010000002 ONDANSETRON PER 1 MG: Performed by: OBSTETRICS & GYNECOLOGY

## 2021-10-29 PROCEDURE — 25010000002 FENTANYL CITRATE (PF) 50 MCG/ML SOLUTION

## 2021-10-29 PROCEDURE — 25010000002 FENTANYL CITRATE (PF) 50 MCG/ML SOLUTION: Performed by: ANESTHESIOLOGY

## 2021-10-29 PROCEDURE — 0UQMXZZ REPAIR VULVA, EXTERNAL APPROACH: ICD-10-PCS | Performed by: OBSTETRICS & GYNECOLOGY

## 2021-10-29 PROCEDURE — C1755 CATHETER, INTRASPINAL: HCPCS | Performed by: ANESTHESIOLOGY

## 2021-10-29 PROCEDURE — 86901 BLOOD TYPING SEROLOGIC RH(D): CPT

## 2021-10-29 PROCEDURE — 86900 BLOOD TYPING SEROLOGIC ABO: CPT | Performed by: OBSTETRICS & GYNECOLOGY

## 2021-10-29 PROCEDURE — 51702 INSERT TEMP BLADDER CATH: CPT

## 2021-10-29 PROCEDURE — 25010000002 ROPIVACAINE PER 1 MG

## 2021-10-29 PROCEDURE — 85025 COMPLETE CBC W/AUTO DIFF WBC: CPT | Performed by: OBSTETRICS & GYNECOLOGY

## 2021-10-29 PROCEDURE — 10H073Z INSERTION OF MONITORING ELECTRODE INTO PRODUCTS OF CONCEPTION, VIA NATURAL OR ARTIFICIAL OPENING: ICD-10-PCS | Performed by: OBSTETRICS & GYNECOLOGY

## 2021-10-29 RX ORDER — DOCUSATE SODIUM 100 MG/1
100 CAPSULE, LIQUID FILLED ORAL 2 TIMES DAILY
Status: DISCONTINUED | OUTPATIENT
Start: 2021-10-29 | End: 2021-10-30 | Stop reason: HOSPADM

## 2021-10-29 RX ORDER — ACETAMINOPHEN 325 MG/1
650 TABLET ORAL EVERY 4 HOURS PRN
Status: DISCONTINUED | OUTPATIENT
Start: 2021-10-29 | End: 2021-10-29 | Stop reason: HOSPADM

## 2021-10-29 RX ORDER — MISOPROSTOL 200 UG/1
200 TABLET ORAL
Status: COMPLETED | OUTPATIENT
Start: 2021-10-29 | End: 2021-10-29

## 2021-10-29 RX ORDER — SODIUM CHLORIDE 0.9 % (FLUSH) 0.9 %
3 SYRINGE (ML) INJECTION EVERY 12 HOURS SCHEDULED
Status: DISCONTINUED | OUTPATIENT
Start: 2021-10-29 | End: 2021-10-29 | Stop reason: HOSPADM

## 2021-10-29 RX ORDER — CEFAZOLIN SODIUM 2 G/100ML
2 INJECTION, SOLUTION INTRAVENOUS
Status: DISCONTINUED | OUTPATIENT
Start: 2021-10-29 | End: 2021-10-29 | Stop reason: HOSPADM

## 2021-10-29 RX ORDER — TRISODIUM CITRATE DIHYDRATE AND CITRIC ACID MONOHYDRATE 500; 334 MG/5ML; MG/5ML
30 SOLUTION ORAL
Status: DISCONTINUED | OUTPATIENT
Start: 2021-10-29 | End: 2021-10-29 | Stop reason: HOSPADM

## 2021-10-29 RX ORDER — OXYTOCIN-SODIUM CHLORIDE 0.9% IV SOLN 30 UNIT/500ML 30-0.9/5 UT/ML-%
125 SOLUTION INTRAVENOUS ONCE
Status: DISCONTINUED | OUTPATIENT
Start: 2021-10-29 | End: 2021-10-30 | Stop reason: HOSPADM

## 2021-10-29 RX ORDER — SODIUM CHLORIDE 0.9 % (FLUSH) 0.9 %
1-10 SYRINGE (ML) INJECTION AS NEEDED
Status: DISCONTINUED | OUTPATIENT
Start: 2021-10-29 | End: 2021-10-30 | Stop reason: HOSPADM

## 2021-10-29 RX ORDER — IBUPROFEN 600 MG/1
600 TABLET ORAL EVERY 6 HOURS PRN
Status: DISCONTINUED | OUTPATIENT
Start: 2021-10-29 | End: 2021-10-29 | Stop reason: HOSPADM

## 2021-10-29 RX ORDER — METOCLOPRAMIDE HYDROCHLORIDE 5 MG/ML
10 INJECTION INTRAMUSCULAR; INTRAVENOUS
Status: DISCONTINUED | OUTPATIENT
Start: 2021-10-29 | End: 2021-10-29 | Stop reason: HOSPADM

## 2021-10-29 RX ORDER — HYDROCODONE BITARTRATE AND ACETAMINOPHEN 5; 325 MG/1; MG/1
1 TABLET ORAL EVERY 4 HOURS PRN
Status: DISCONTINUED | OUTPATIENT
Start: 2021-10-29 | End: 2021-10-29 | Stop reason: HOSPADM

## 2021-10-29 RX ORDER — TRISODIUM CITRATE DIHYDRATE AND CITRIC ACID MONOHYDRATE 500; 334 MG/5ML; MG/5ML
30 SOLUTION ORAL ONCE AS NEEDED
Status: DISCONTINUED | OUTPATIENT
Start: 2021-10-29 | End: 2021-10-29 | Stop reason: HOSPADM

## 2021-10-29 RX ORDER — SODIUM CHLORIDE 0.9 % (FLUSH) 0.9 %
10 SYRINGE (ML) INJECTION AS NEEDED
Status: DISCONTINUED | OUTPATIENT
Start: 2021-10-29 | End: 2021-10-29 | Stop reason: HOSPADM

## 2021-10-29 RX ORDER — EPHEDRINE SULFATE 50 MG/ML
5 INJECTION, SOLUTION INTRAVENOUS
Status: DISCONTINUED | OUTPATIENT
Start: 2021-10-29 | End: 2021-10-29 | Stop reason: HOSPADM

## 2021-10-29 RX ORDER — TERBUTALINE SULFATE 1 MG/ML
0.25 INJECTION, SOLUTION SUBCUTANEOUS AS NEEDED
Status: DISCONTINUED | OUTPATIENT
Start: 2021-10-29 | End: 2021-10-29 | Stop reason: HOSPADM

## 2021-10-29 RX ORDER — LIDOCAINE HYDROCHLORIDE AND EPINEPHRINE 15; 5 MG/ML; UG/ML
INJECTION, SOLUTION EPIDURAL
Status: COMPLETED | OUTPATIENT
Start: 2021-10-29 | End: 2021-10-29

## 2021-10-29 RX ORDER — ONDANSETRON 4 MG/1
4 TABLET, FILM COATED ORAL EVERY 6 HOURS PRN
Status: DISCONTINUED | OUTPATIENT
Start: 2021-10-29 | End: 2021-10-30 | Stop reason: HOSPADM

## 2021-10-29 RX ORDER — FENTANYL CITRATE 50 UG/ML
INJECTION, SOLUTION INTRAMUSCULAR; INTRAVENOUS
Status: COMPLETED
Start: 2021-10-29 | End: 2021-10-29

## 2021-10-29 RX ORDER — SODIUM CHLORIDE, SODIUM LACTATE, POTASSIUM CHLORIDE, CALCIUM CHLORIDE 600; 310; 30; 20 MG/100ML; MG/100ML; MG/100ML; MG/100ML
125 INJECTION, SOLUTION INTRAVENOUS CONTINUOUS
Status: DISCONTINUED | OUTPATIENT
Start: 2021-10-29 | End: 2021-10-30 | Stop reason: HOSPADM

## 2021-10-29 RX ORDER — FAMOTIDINE 10 MG/ML
20 INJECTION, SOLUTION INTRAVENOUS
Status: DISCONTINUED | OUTPATIENT
Start: 2021-10-29 | End: 2021-10-29 | Stop reason: HOSPADM

## 2021-10-29 RX ORDER — FENTANYL CITRATE 50 UG/ML
INJECTION, SOLUTION INTRAMUSCULAR; INTRAVENOUS
Status: COMPLETED | OUTPATIENT
Start: 2021-10-29 | End: 2021-10-29

## 2021-10-29 RX ORDER — ONDANSETRON 4 MG/1
4 TABLET, FILM COATED ORAL EVERY 6 HOURS PRN
Status: DISCONTINUED | OUTPATIENT
Start: 2021-10-29 | End: 2021-10-29 | Stop reason: HOSPADM

## 2021-10-29 RX ORDER — PRENATAL VIT/IRON FUM/FOLIC AC 27MG-0.8MG
1 TABLET ORAL DAILY
Status: DISCONTINUED | OUTPATIENT
Start: 2021-10-29 | End: 2021-10-30 | Stop reason: HOSPADM

## 2021-10-29 RX ORDER — METHYLERGONOVINE MALEATE 0.2 MG/ML
200 INJECTION INTRAVENOUS ONCE AS NEEDED
Status: DISCONTINUED | OUTPATIENT
Start: 2021-10-29 | End: 2021-10-29 | Stop reason: HOSPADM

## 2021-10-29 RX ORDER — MORPHINE SULFATE 2 MG/ML
2 INJECTION, SOLUTION INTRAMUSCULAR; INTRAVENOUS
Status: DISCONTINUED | OUTPATIENT
Start: 2021-10-29 | End: 2021-10-29 | Stop reason: HOSPADM

## 2021-10-29 RX ORDER — MISOPROSTOL 200 UG/1
800 TABLET ORAL AS NEEDED
Status: DISCONTINUED | OUTPATIENT
Start: 2021-10-29 | End: 2021-10-29 | Stop reason: HOSPADM

## 2021-10-29 RX ORDER — HYDROCODONE BITARTRATE AND ACETAMINOPHEN 5; 325 MG/1; MG/1
2 TABLET ORAL EVERY 4 HOURS PRN
Status: DISCONTINUED | OUTPATIENT
Start: 2021-10-29 | End: 2021-10-29 | Stop reason: HOSPADM

## 2021-10-29 RX ORDER — CALCIUM CARBONATE 200(500)MG
2 TABLET,CHEWABLE ORAL 3 TIMES DAILY PRN
Status: DISCONTINUED | OUTPATIENT
Start: 2021-10-29 | End: 2021-10-30 | Stop reason: HOSPADM

## 2021-10-29 RX ORDER — OXYTOCIN-SODIUM CHLORIDE 0.9% IV SOLN 30 UNIT/500ML 30-0.9/5 UT/ML-%
125 SOLUTION INTRAVENOUS ONCE
Status: COMPLETED | OUTPATIENT
Start: 2021-10-29 | End: 2021-10-29

## 2021-10-29 RX ORDER — ONDANSETRON 2 MG/ML
4 INJECTION INTRAMUSCULAR; INTRAVENOUS EVERY 6 HOURS PRN
Status: DISCONTINUED | OUTPATIENT
Start: 2021-10-29 | End: 2021-10-29 | Stop reason: HOSPADM

## 2021-10-29 RX ORDER — CARBOPROST TROMETHAMINE 250 UG/ML
250 INJECTION, SOLUTION INTRAMUSCULAR AS NEEDED
Status: DISCONTINUED | OUTPATIENT
Start: 2021-10-29 | End: 2021-10-29 | Stop reason: HOSPADM

## 2021-10-29 RX ORDER — EPHEDRINE SULFATE 50 MG/ML
INJECTION INTRAVENOUS
Status: COMPLETED
Start: 2021-10-29 | End: 2021-10-29

## 2021-10-29 RX ORDER — IBUPROFEN 600 MG/1
600 TABLET ORAL EVERY 6 HOURS PRN
Status: DISCONTINUED | OUTPATIENT
Start: 2021-10-29 | End: 2021-10-30 | Stop reason: HOSPADM

## 2021-10-29 RX ORDER — HYDROCODONE BITARTRATE AND ACETAMINOPHEN 5; 325 MG/1; MG/1
2 TABLET ORAL EVERY 4 HOURS PRN
Status: DISCONTINUED | OUTPATIENT
Start: 2021-10-29 | End: 2021-10-30 | Stop reason: HOSPADM

## 2021-10-29 RX ORDER — LIDOCAINE HYDROCHLORIDE 10 MG/ML
5 INJECTION, SOLUTION EPIDURAL; INFILTRATION; INTRACAUDAL; PERINEURAL AS NEEDED
Status: DISCONTINUED | OUTPATIENT
Start: 2021-10-29 | End: 2021-10-29 | Stop reason: HOSPADM

## 2021-10-29 RX ORDER — BISACODYL 10 MG
10 SUPPOSITORY, RECTAL RECTAL DAILY PRN
Status: DISCONTINUED | OUTPATIENT
Start: 2021-10-30 | End: 2021-10-30 | Stop reason: HOSPADM

## 2021-10-29 RX ORDER — HYDROCODONE BITARTRATE AND ACETAMINOPHEN 5; 325 MG/1; MG/1
1 TABLET ORAL EVERY 4 HOURS PRN
Status: DISCONTINUED | OUTPATIENT
Start: 2021-10-29 | End: 2021-10-30 | Stop reason: HOSPADM

## 2021-10-29 RX ADMIN — PRENATAL WITH FERROUS FUM AND FOLIC ACID 1 TABLET: 3080; 920; 120; 400; 22; 1.84; 3; 20; 10; 1; 12; 200; 27; 25; 2 TABLET ORAL at 11:29

## 2021-10-29 RX ADMIN — LIDOCAINE HYDROCHLORIDE AND EPINEPHRINE 5 ML: 15; 5 INJECTION, SOLUTION EPIDURAL at 05:08

## 2021-10-29 RX ADMIN — Medication: at 11:28

## 2021-10-29 RX ADMIN — WITCH HAZEL 1 PAD: 500 SOLUTION RECTAL; TOPICAL at 11:28

## 2021-10-29 RX ADMIN — MISOPROSTOL 200 MCG: 200 TABLET ORAL at 11:28

## 2021-10-29 RX ADMIN — SODIUM CHLORIDE, POTASSIUM CHLORIDE, SODIUM LACTATE AND CALCIUM CHLORIDE 125 ML/HR: 600; 310; 30; 20 INJECTION, SOLUTION INTRAVENOUS at 02:45

## 2021-10-29 RX ADMIN — FENTANYL CITRATE 100 MCG: 50 INJECTION, SOLUTION INTRAMUSCULAR; INTRAVENOUS at 05:08

## 2021-10-29 RX ADMIN — MISOPROSTOL 200 MCG: 200 TABLET ORAL at 07:35

## 2021-10-29 RX ADMIN — IBUPROFEN 600 MG: 600 TABLET ORAL at 16:39

## 2021-10-29 RX ADMIN — ONDANSETRON 4 MG: 2 INJECTION INTRAMUSCULAR; INTRAVENOUS at 07:32

## 2021-10-29 RX ADMIN — DOCUSATE SODIUM 100 MG: 100 CAPSULE, LIQUID FILLED ORAL at 11:28

## 2021-10-29 RX ADMIN — DOCUSATE SODIUM 100 MG: 100 CAPSULE, LIQUID FILLED ORAL at 21:49

## 2021-10-29 RX ADMIN — HYDROCODONE BITARTRATE AND ACETAMINOPHEN 1 TABLET: 5; 325 TABLET ORAL at 16:40

## 2021-10-29 RX ADMIN — EPHEDRINE SULFATE 5 MG: 50 INJECTION, SOLUTION INTRAVENOUS at 05:00

## 2021-10-29 RX ADMIN — EPHEDRINE SULFATE 5 MG: 50 INJECTION INTRAVENOUS at 05:00

## 2021-10-29 RX ADMIN — IBUPROFEN 600 MG: 600 TABLET ORAL at 11:28

## 2021-10-29 RX ADMIN — HYDROCODONE BITARTRATE AND ACETAMINOPHEN 1 TABLET: 5; 325 TABLET ORAL at 23:19

## 2021-10-29 RX ADMIN — OXYTOCIN 125 ML/HR: 10 INJECTION, SOLUTION INTRAMUSCULAR; INTRAVENOUS at 07:32

## 2021-10-29 RX ADMIN — SODIUM CHLORIDE, POTASSIUM CHLORIDE, SODIUM LACTATE AND CALCIUM CHLORIDE 150 ML/HR: 600; 310; 30; 20 INJECTION, SOLUTION INTRAVENOUS at 05:10

## 2021-10-29 NOTE — ANESTHESIA PREPROCEDURE EVALUATION
Anesthesia Evaluation     Patient summary reviewed and Nursing notes reviewed   no history of anesthetic complications:  NPO Solid Status: > 8 hours  NPO Liquid Status: > 2 hours           Airway   Mallampati: II  TM distance: >3 FB  Dental      Pulmonary - negative pulmonary ROS and normal exam   Cardiovascular - negative cardio ROS and normal exam  Exercise tolerance: good (4-7 METS)        Neuro/Psych- negative ROS  GI/Hepatic/Renal/Endo    (+)  GERD well controlled,      Musculoskeletal (-) negative ROS    Abdominal    Substance History - negative use     OB/GYN    (+) Pregnant,         Other - negative ROS                       Anesthesia Plan    ASA 2     regional       Anesthetic plan, all risks, benefits, and alternatives have been provided, discussed and informed consent has been obtained with: patient.

## 2021-10-29 NOTE — ANESTHESIA POSTPROCEDURE EVALUATION
Patient: Raysa Ellis    Procedure Summary     Date: 10/29/21 Room / Location:     Anesthesia Start: 0435 Anesthesia Stop: 0709    Procedure: LABOR ANALGESIA Diagnosis:     Scheduled Providers:  Provider: Eliot Hoffman MD    Anesthesia Type: regional ASA Status: 2          Anesthesia Type: regional    Vitals  Vitals Value Taken Time   /76 10/29/21 0745   Temp 36.9 °C (98.5 °F) 10/29/21 0600   Pulse 76 10/29/21 0745   Resp 20 10/29/21 0600   SpO2 99 % 10/29/21 0659   Vitals shown include unvalidated device data.        Post Anesthesia Care and Evaluation    Patient location during evaluation: bedside  Patient participation: complete - patient participated  Level of consciousness: awake and alert  Pain management: adequate  Airway patency: patent  Anesthetic complications: No anesthetic complications  PONV Status: none  Cardiovascular status: acceptable  Respiratory status: acceptable  Hydration status: acceptable  Post Neuraxial Block status: Motor and sensory function returned to baseline and No signs or symptoms of PDPH

## 2021-10-29 NOTE — ANESTHESIA PROCEDURE NOTES
Labor Epidural      Patient reassessed immediately prior to procedure    Patient location during procedure: OB  Performed By  Anesthesiologist: Eliot Hoffman MD  Preanesthetic Checklist  Completed: patient identified, IV checked, risks and benefits discussed, surgical consent, monitors and equipment checked, pre-op evaluation and timeout performed  Prep:  Pt Position:sitting  Sterile Tech:cap, gloves, sterile barrier, mask and gown  Prep:chlorhexidine gluconate and isopropyl alcohol  Monitoring:blood pressure monitoring, continuous pulse oximetry and EKG  Epidural Block Procedure:  Approach:midline  Guidance:landmark technique and palpation technique  Location:L3-L4  Needle Type:Tuohy  Needle Gauge:17 G  Loss of Resistance Medium: saline  Loss of Resistance: 7cm  Cath Depth at skin:12 cm  Paresthesia: none  Aspiration:negative  Test Dose:negative  Test dose medication: lidocaine 1.5%-EPINEPHrine 1:200,000 (XYLOCAINE W/EPI) injection, 5 mL  fentaNYL citrate (PF) (SUBLIMAZE) injection, 100 mcg  Number of Attempts: 1  Post Assessment:  Dressing:occlusive dressing applied and secured with tape  Pt Tolerance:patient tolerated the procedure well with no apparent complications  Complications:no

## 2021-10-30 VITALS
DIASTOLIC BLOOD PRESSURE: 60 MMHG | OXYGEN SATURATION: 96 % | RESPIRATION RATE: 18 BRPM | HEIGHT: 64 IN | WEIGHT: 156 LBS | TEMPERATURE: 98.2 F | HEART RATE: 56 BPM | SYSTOLIC BLOOD PRESSURE: 110 MMHG | BODY MASS INDEX: 26.63 KG/M2

## 2021-10-30 PROBLEM — Z37.9 NORMAL LABOR: Status: RESOLVED | Noted: 2021-10-29 | Resolved: 2021-10-30

## 2021-10-30 PROBLEM — O34.219 VBAC, DELIVERED: Status: ACTIVE | Noted: 2021-10-30

## 2021-10-30 LAB
HCT VFR BLD AUTO: 35 % (ref 34–46.6)
HGB BLD-MCNC: 11.5 G/DL (ref 12–15.9)

## 2021-10-30 PROCEDURE — 85018 HEMOGLOBIN: CPT | Performed by: OBSTETRICS & GYNECOLOGY

## 2021-10-30 PROCEDURE — 85014 HEMATOCRIT: CPT | Performed by: OBSTETRICS & GYNECOLOGY

## 2021-10-30 PROCEDURE — 0503F POSTPARTUM CARE VISIT: CPT | Performed by: STUDENT IN AN ORGANIZED HEALTH CARE EDUCATION/TRAINING PROGRAM

## 2021-10-30 PROCEDURE — 25010000002 PENICILLIN G POTASSIUM PER 600000 UNITS: Performed by: OBSTETRICS & GYNECOLOGY

## 2021-10-30 RX ORDER — IBUPROFEN 600 MG/1
600 TABLET ORAL EVERY 6 HOURS PRN
Qty: 60 TABLET | Refills: 1 | Status: SHIPPED | OUTPATIENT
Start: 2021-10-30 | End: 2022-01-05

## 2021-10-30 RX ADMIN — HYDROCODONE BITARTRATE AND ACETAMINOPHEN 1 TABLET: 5; 325 TABLET ORAL at 08:51

## 2021-10-30 RX ADMIN — PRENATAL WITH FERROUS FUM AND FOLIC ACID 1 TABLET: 3080; 920; 120; 400; 22; 1.84; 3; 20; 10; 1; 12; 200; 27; 25; 2 TABLET ORAL at 08:46

## 2021-10-30 RX ADMIN — DOCUSATE SODIUM 100 MG: 100 CAPSULE, LIQUID FILLED ORAL at 08:46

## 2021-10-30 RX ADMIN — IBUPROFEN 600 MG: 600 TABLET ORAL at 05:02

## 2021-11-01 ENCOUNTER — TELEPHONE (OUTPATIENT)
Dept: OBSTETRICS AND GYNECOLOGY | Facility: CLINIC | Age: 26
End: 2021-11-01

## 2021-11-01 NOTE — TELEPHONE ENCOUNTER
PATIENT CALLED THIS AM.  SHE STATES STILL HAVING A LOT OF PAIN THAT IBUPROFEN NOT HELPING. PATIENT  DELIVERED ON 10-30-21.

## 2022-01-05 PROCEDURE — U0004 COV-19 TEST NON-CDC HGH THRU: HCPCS | Performed by: FAMILY MEDICINE

## 2022-02-10 ENCOUNTER — OFFICE VISIT (OUTPATIENT)
Dept: FAMILY MEDICINE CLINIC | Facility: CLINIC | Age: 27
End: 2022-02-10

## 2022-02-10 VITALS
OXYGEN SATURATION: 98 % | DIASTOLIC BLOOD PRESSURE: 68 MMHG | BODY MASS INDEX: 22.09 KG/M2 | TEMPERATURE: 97.6 F | HEART RATE: 96 BPM | HEIGHT: 64 IN | SYSTOLIC BLOOD PRESSURE: 110 MMHG | WEIGHT: 129.4 LBS

## 2022-02-10 DIAGNOSIS — F43.21 GRIEVING: ICD-10-CM

## 2022-02-10 DIAGNOSIS — F32.2 CURRENT SEVERE EPISODE OF MAJOR DEPRESSIVE DISORDER WITHOUT PSYCHOTIC FEATURES WITHOUT PRIOR EPISODE: Primary | ICD-10-CM

## 2022-02-10 DIAGNOSIS — F41.1 GENERALIZED ANXIETY DISORDER: ICD-10-CM

## 2022-02-10 PROBLEM — O34.219 VBAC, DELIVERED: Status: RESOLVED | Noted: 2021-10-30 | Resolved: 2022-02-10

## 2022-02-10 PROBLEM — Z3A.39 39 WEEKS GESTATION OF PREGNANCY: Status: RESOLVED | Noted: 2021-10-22 | Resolved: 2022-02-10

## 2022-02-10 PROCEDURE — 99214 OFFICE O/P EST MOD 30 MIN: CPT | Performed by: NURSE PRACTITIONER

## 2022-02-10 RX ORDER — HYDROXYZINE HYDROCHLORIDE 25 MG/1
25 TABLET, FILM COATED ORAL EVERY 6 HOURS PRN
Qty: 90 TABLET | Refills: 1 | Status: SHIPPED | OUTPATIENT
Start: 2022-02-10 | End: 2022-04-13

## 2022-02-10 RX ORDER — SERTRALINE HYDROCHLORIDE 25 MG/1
50 TABLET, FILM COATED ORAL DAILY
Qty: 60 TABLET | Refills: 2 | Status: SHIPPED | OUTPATIENT
Start: 2022-02-10 | End: 2022-04-13

## 2022-02-10 NOTE — PROGRESS NOTES
Chief Complaint  Depression and Anxiety    Subjective          Raysa Ellis presents to [facility]  History of Present Illness  Presents today for an acute visit for depression and anxiety.  Last month on January 23, 2022 she lost her 3-month-old baby due to SIDS.  She states she has a history of depression anxiety since the death of her baby boy.  Anxiety and depression has significantly increased.  She has family support.  Has a 9-year-old boy and a 2-year-old girl.  They are her motivation to get up each day.  She was previously on Lexapro 20 mg a couple years ago.  She states the medication did not work well.    PHQ-9 Depression Screening  Little interest or pleasure in doing things? 3   Feeling down, depressed, or hopeless? 3   Trouble falling or staying asleep, or sleeping too much? 3   Feeling tired or having little energy? 2   Poor appetite or overeating? 2   Feeling bad about yourself - or that you are a failure or have let yourself or your family down? 2   Trouble concentrating on things, such as reading the newspaper or watching television? 3   Moving or speaking so slowly that other people could have noticed? Or the opposite - being so fidgety or restless that you have been moving around a lot more than usual? 0   Thoughts that you would be better off dead, or of hurting yourself in some way? 3   PHQ-9 Total Score 21   If you checked off any problems, how difficult have these problems made it for you to do your work, take care of things at home, or get along with other people? Extremely dIfficult         CESAR - 7 Anxiety    Date data was collected: 2/10/2022    Over the last 2 weeks, how often have you been bothered by any of the following problems?    1. Feeling nervous, anxious or on edge: Nearly every day = 3   2. Not being able to stop or control worrying Nearly every day = 3   3. Worrying too much about different things: Nearly every day = 3   4. Trouble relaxing:  "Nearly every day = 3   5. Being so restless that it is hard to sit still: Nearly every day = 3   6. Becoming easily annoyed or irritable: Nearly every day = 3   7. Feeling afraid as if something awful might happen: Nearly every day = 3    Total Score 21     If you checked off any problems, how difficult have these problems made it for you to do your work, take care of things at home or get along with other people? Extremely difficult    ______________________________________________________________________  CESAR-7 SCORING CARD FOR SEVERITY DETERMINATION    Scoring -- add up all answers  Not at all =0; Serveral Days = 1; More than half the days - 2; Nearly every day =3      Interpretation of Total Score  Total Score Anxiety Severity   0-5 Mild   6-10 Moderate   11-15 Moderately Severe   15-21 Severe     Social History     Socioeconomic History   • Marital status: Single   Tobacco Use   • Smoking status: Former Smoker     Packs/day: 0.50     Years: 5.00     Pack years: 2.50     Quit date: 2018     Years since quittin.1   • Smokeless tobacco: Never Used   Vaping Use   • Vaping Use: Never used   Substance and Sexual Activity   • Alcohol use: No   • Drug use: No   • Sexual activity: Yes     Partners: Male         Objective   Vital Signs:   /68   Pulse 96   Temp 97.6 °F (36.4 °C)   Ht 162.6 cm (64\")   Wt 58.7 kg (129 lb 6.4 oz)   SpO2 98%   BMI 22.21 kg/m²     Physical Exam  Vitals reviewed.   Constitutional:       Appearance: Normal appearance. She is well-developed.   HENT:      Head: Normocephalic and atraumatic.      Right Ear: External ear normal.      Left Ear: External ear normal.      Mouth/Throat:      Pharynx: No oropharyngeal exudate.   Eyes:      Conjunctiva/sclera: Conjunctivae normal.      Pupils: Pupils are equal, round, and reactive to light.   Cardiovascular:      Rate and Rhythm: Normal rate and regular rhythm.      Heart sounds: No murmur heard.  No friction rub. No gallop.    Pulmonary: "      Effort: Pulmonary effort is normal.      Breath sounds: Normal breath sounds. No wheezing or rhonchi.   Abdominal:      General: Bowel sounds are normal. There is no distension.      Palpations: Abdomen is soft.      Tenderness: There is no abdominal tenderness.   Skin:     General: Skin is warm and dry.   Neurological:      Mental Status: She is alert and oriented to person, place, and time.      Cranial Nerves: No cranial nerve deficit.   Psychiatric:         Mood and Affect: Affect normal. Mood is depressed.         Behavior: Behavior normal.         Thought Content: Thought content is not paranoid or delusional. Thought content does not include homicidal or suicidal plan.         Judgment: Judgment normal.        Result Review :                 Assessment and Plan    Diagnoses and all orders for this visit:    1. Current severe episode of major depressive disorder without psychotic features without prior episode (HCC) (Primary)  Assessment & Plan:  Patient's depression is single episode and is severe without psychosis. Their depression is currently active and the condition is newly identified. This will be reassessed in 4 weeks. F/U as described:patient was prescribed an antidepressant medicine.    Sertraline 25 mg daily x1 week then increase to 50 mg daily    Orders:  -     sertraline (Zoloft) 25 MG tablet; Take 2 tablets by mouth Daily.  Dispense: 60 tablet; Refill: 2    2. Generalized anxiety disorder  Assessment & Plan:  Psychological condition is newly identified.  Regular aerobic exercise.  Start hydroxyzine 25 mg 1 to 2 tablets 4 times daily as needed  Psychological condition  will be reassessed in 4 weeks.    Orders:  -     hydrOXYzine (ATARAX) 25 MG tablet; Take 1 tablet by mouth Every 6 (Six) Hours As Needed for Itching or Anxiety. 1-2 tablets 4 times a day as needed.  Dispense: 90 tablet; Refill: 1    3. Grieving  I've explained to her that drugs of the SSRI class can have side effects such as  weight gain, sexual dysfunction, insomnia, headache, nausea. These medications are generally effective at alleviating symptoms of anxiety and/or depression. Let me know if significant side effects do occur.      Follow Up   Return in about 4 weeks (around 3/10/2022), or if symptoms worsen or fail to improve, for Next scheduled follow up.  Patient was given instructions and counseling regarding her condition or for health maintenance advice. Please see specific information pulled into the AVS if appropriate.

## 2022-02-10 NOTE — ASSESSMENT & PLAN NOTE
Psychological condition is newly identified.  Regular aerobic exercise.  Start hydroxyzine 25 mg 1 to 2 tablets 4 times daily as needed  Psychological condition  will be reassessed in 4 weeks.

## 2022-02-10 NOTE — ASSESSMENT & PLAN NOTE
Patient's depression is single episode and is severe without psychosis. Their depression is currently active and the condition is newly identified. This will be reassessed in 4 weeks. F/U as described:patient was prescribed an antidepressant medicine.    Sertraline 25 mg daily x1 week then increase to 50 mg daily

## 2022-02-25 ENCOUNTER — HOSPITAL ENCOUNTER (EMERGENCY)
Facility: HOSPITAL | Age: 27
Discharge: LEFT BEFORE DISCHARGE | End: 2022-02-25
Attending: EMERGENCY MEDICINE

## 2022-02-25 VITALS
OXYGEN SATURATION: 100 % | DIASTOLIC BLOOD PRESSURE: 63 MMHG | BODY MASS INDEX: 21.68 KG/M2 | HEIGHT: 64 IN | RESPIRATION RATE: 16 BRPM | TEMPERATURE: 98.3 F | HEART RATE: 84 BPM | SYSTOLIC BLOOD PRESSURE: 107 MMHG | WEIGHT: 126.98 LBS

## 2022-02-25 LAB
ALBUMIN SERPL-MCNC: 4.9 G/DL (ref 3.5–5.2)
ALBUMIN/GLOB SERPL: 2.5 G/DL
ALP SERPL-CCNC: 76 U/L (ref 39–117)
ALT SERPL W P-5'-P-CCNC: 18 U/L (ref 1–33)
ANION GAP SERPL CALCULATED.3IONS-SCNC: 10.7 MMOL/L (ref 5–15)
AST SERPL-CCNC: 17 U/L (ref 1–32)
BASOPHILS # BLD AUTO: 0.02 10*3/MM3 (ref 0–0.2)
BASOPHILS NFR BLD AUTO: 0.4 % (ref 0–1.5)
BILIRUB SERPL-MCNC: 0.5 MG/DL (ref 0–1.2)
BUN SERPL-MCNC: 10 MG/DL (ref 6–20)
BUN/CREAT SERPL: 15.4 (ref 7–25)
CALCIUM SPEC-SCNC: 9.4 MG/DL (ref 8.6–10.5)
CHLORIDE SERPL-SCNC: 104 MMOL/L (ref 98–107)
CO2 SERPL-SCNC: 23.3 MMOL/L (ref 22–29)
CREAT SERPL-MCNC: 0.65 MG/DL (ref 0.57–1)
DEPRECATED RDW RBC AUTO: 39.5 FL (ref 37–54)
EOSINOPHIL # BLD AUTO: 0.12 10*3/MM3 (ref 0–0.4)
EOSINOPHIL NFR BLD AUTO: 2.1 % (ref 0.3–6.2)
ERYTHROCYTE [DISTWIDTH] IN BLOOD BY AUTOMATED COUNT: 12.7 % (ref 12.3–15.4)
GFR SERPL CREATININE-BSD FRML MDRD: 109 ML/MIN/1.73
GLOBULIN UR ELPH-MCNC: 2 GM/DL
GLUCOSE SERPL-MCNC: 100 MG/DL (ref 65–99)
HCG INTACT+B SERPL-ACNC: 2659 MIU/ML
HCT VFR BLD AUTO: 37.4 % (ref 34–46.6)
HGB BLD-MCNC: 13 G/DL (ref 12–15.9)
HOLD SPECIMEN: NORMAL
HOLD SPECIMEN: NORMAL
IMM GRANULOCYTES # BLD AUTO: 0.02 10*3/MM3 (ref 0–0.05)
IMM GRANULOCYTES NFR BLD AUTO: 0.4 % (ref 0–0.5)
LYMPHOCYTES # BLD AUTO: 1.81 10*3/MM3 (ref 0.7–3.1)
LYMPHOCYTES NFR BLD AUTO: 32 % (ref 19.6–45.3)
MCH RBC QN AUTO: 29.6 PG (ref 26.6–33)
MCHC RBC AUTO-ENTMCNC: 34.8 G/DL (ref 31.5–35.7)
MCV RBC AUTO: 85.2 FL (ref 79–97)
MONOCYTES # BLD AUTO: 0.41 10*3/MM3 (ref 0.1–0.9)
MONOCYTES NFR BLD AUTO: 7.2 % (ref 5–12)
NEUTROPHILS NFR BLD AUTO: 3.28 10*3/MM3 (ref 1.7–7)
NEUTROPHILS NFR BLD AUTO: 57.9 % (ref 42.7–76)
NRBC BLD AUTO-RTO: 0 /100 WBC (ref 0–0.2)
PLATELET # BLD AUTO: 173 10*3/MM3 (ref 140–450)
PMV BLD AUTO: 10 FL (ref 6–12)
POTASSIUM SERPL-SCNC: 3.7 MMOL/L (ref 3.5–5.2)
PROT SERPL-MCNC: 6.9 G/DL (ref 6–8.5)
RBC # BLD AUTO: 4.39 10*6/MM3 (ref 3.77–5.28)
SODIUM SERPL-SCNC: 138 MMOL/L (ref 136–145)
WBC NRBC COR # BLD: 5.66 10*3/MM3 (ref 3.4–10.8)
WHOLE BLOOD HOLD SPECIMEN: NORMAL
WHOLE BLOOD HOLD SPECIMEN: NORMAL

## 2022-02-25 PROCEDURE — 85025 COMPLETE CBC W/AUTO DIFF WBC: CPT

## 2022-02-25 PROCEDURE — 80053 COMPREHEN METABOLIC PANEL: CPT | Performed by: EMERGENCY MEDICINE

## 2022-02-25 PROCEDURE — 36415 COLL VENOUS BLD VENIPUNCTURE: CPT

## 2022-02-25 PROCEDURE — 99211 OFF/OP EST MAY X REQ PHY/QHP: CPT | Performed by: EMERGENCY MEDICINE

## 2022-02-25 PROCEDURE — 84702 CHORIONIC GONADOTROPIN TEST: CPT

## 2022-02-25 RX ORDER — SODIUM CHLORIDE 0.9 % (FLUSH) 0.9 %
10 SYRINGE (ML) INJECTION AS NEEDED
Status: DISCONTINUED | OUTPATIENT
Start: 2022-02-25 | End: 2022-02-25 | Stop reason: HOSPADM

## 2022-02-28 ENCOUNTER — HOSPITAL ENCOUNTER (EMERGENCY)
Facility: HOSPITAL | Age: 27
Discharge: HOME | End: 2022-02-28
Attending: EMERGENCY MEDICINE | Admitting: EMERGENCY MEDICINE

## 2022-02-28 ENCOUNTER — APPOINTMENT (OUTPATIENT)
Dept: ULTRASOUND IMAGING | Facility: HOSPITAL | Age: 27
End: 2022-02-28

## 2022-02-28 VITALS
DIASTOLIC BLOOD PRESSURE: 60 MMHG | HEART RATE: 77 BPM | WEIGHT: 129.63 LBS | HEIGHT: 64 IN | RESPIRATION RATE: 18 BRPM | OXYGEN SATURATION: 99 % | BODY MASS INDEX: 22.13 KG/M2 | TEMPERATURE: 98.3 F | SYSTOLIC BLOOD PRESSURE: 116 MMHG

## 2022-02-28 DIAGNOSIS — O20.0 THREATENED MISCARRIAGE: Primary | ICD-10-CM

## 2022-02-28 LAB
BASOPHILS # BLD AUTO: 0.03 10*3/MM3 (ref 0–0.2)
BASOPHILS NFR BLD AUTO: 0.6 % (ref 0–1.5)
BILIRUB UR QL STRIP: NEGATIVE
C TRACH RRNA CVX QL NAA+PROBE: NOT DETECTED
CANDIDA SPECIES: NEGATIVE
CLARITY UR: CLEAR
COLOR UR: YELLOW
DEPRECATED RDW RBC AUTO: 40.3 FL (ref 37–54)
EOSINOPHIL # BLD AUTO: 0.12 10*3/MM3 (ref 0–0.4)
EOSINOPHIL NFR BLD AUTO: 2.5 % (ref 0.3–6.2)
ERYTHROCYTE [DISTWIDTH] IN BLOOD BY AUTOMATED COUNT: 12.9 % (ref 12.3–15.4)
GARDNERELLA VAGINALIS: POSITIVE
GLUCOSE UR STRIP-MCNC: NEGATIVE MG/DL
HCG INTACT+B SERPL-ACNC: 6753 MIU/ML
HCT VFR BLD AUTO: 36.6 % (ref 34–46.6)
HGB BLD-MCNC: 12.7 G/DL (ref 12–15.9)
HGB UR QL STRIP.AUTO: NEGATIVE
HOLD SPECIMEN: NORMAL
HOLD SPECIMEN: NORMAL
IMM GRANULOCYTES # BLD AUTO: 0.01 10*3/MM3 (ref 0–0.05)
IMM GRANULOCYTES NFR BLD AUTO: 0.2 % (ref 0–0.5)
KETONES UR QL STRIP: NEGATIVE
LEUKOCYTE ESTERASE UR QL STRIP.AUTO: NEGATIVE
LYMPHOCYTES # BLD AUTO: 1.53 10*3/MM3 (ref 0.7–3.1)
LYMPHOCYTES NFR BLD AUTO: 32.1 % (ref 19.6–45.3)
MCH RBC QN AUTO: 30 PG (ref 26.6–33)
MCHC RBC AUTO-ENTMCNC: 34.7 G/DL (ref 31.5–35.7)
MCV RBC AUTO: 86.5 FL (ref 79–97)
MONOCYTES # BLD AUTO: 0.35 10*3/MM3 (ref 0.1–0.9)
MONOCYTES NFR BLD AUTO: 7.3 % (ref 5–12)
N GONORRHOEA RRNA SPEC QL NAA+PROBE: NOT DETECTED
NEUTROPHILS NFR BLD AUTO: 2.73 10*3/MM3 (ref 1.7–7)
NEUTROPHILS NFR BLD AUTO: 57.3 % (ref 42.7–76)
NITRITE UR QL STRIP: NEGATIVE
NRBC BLD AUTO-RTO: 0 /100 WBC (ref 0–0.2)
PH UR STRIP.AUTO: 7.5 [PH] (ref 5–8)
PLATELET # BLD AUTO: 173 10*3/MM3 (ref 140–450)
PMV BLD AUTO: 10.1 FL (ref 6–12)
PROT UR QL STRIP: NEGATIVE
RBC # BLD AUTO: 4.23 10*6/MM3 (ref 3.77–5.28)
SP GR UR STRIP: 1.01 (ref 1–1.03)
T VAGINALIS DNA VAG QL PROBE+SIG AMP: NEGATIVE
UROBILINOGEN UR QL STRIP: NORMAL
WBC NRBC COR # BLD: 4.77 10*3/MM3 (ref 3.4–10.8)
WHOLE BLOOD HOLD SPECIMEN: NORMAL
WHOLE BLOOD HOLD SPECIMEN: NORMAL

## 2022-02-28 PROCEDURE — 87510 GARDNER VAG DNA DIR PROBE: CPT | Performed by: EMERGENCY MEDICINE

## 2022-02-28 PROCEDURE — 87591 N.GONORRHOEAE DNA AMP PROB: CPT | Performed by: EMERGENCY MEDICINE

## 2022-02-28 PROCEDURE — 76817 TRANSVAGINAL US OBSTETRIC: CPT

## 2022-02-28 PROCEDURE — 84702 CHORIONIC GONADOTROPIN TEST: CPT | Performed by: EMERGENCY MEDICINE

## 2022-02-28 PROCEDURE — 87660 TRICHOMONAS VAGIN DIR PROBE: CPT | Performed by: EMERGENCY MEDICINE

## 2022-02-28 PROCEDURE — 99283 EMERGENCY DEPT VISIT LOW MDM: CPT

## 2022-02-28 PROCEDURE — 85025 COMPLETE CBC W/AUTO DIFF WBC: CPT

## 2022-02-28 PROCEDURE — 81003 URINALYSIS AUTO W/O SCOPE: CPT | Performed by: EMERGENCY MEDICINE

## 2022-02-28 PROCEDURE — 87480 CANDIDA DNA DIR PROBE: CPT | Performed by: EMERGENCY MEDICINE

## 2022-02-28 PROCEDURE — 87491 CHLMYD TRACH DNA AMP PROBE: CPT | Performed by: EMERGENCY MEDICINE

## 2022-02-28 PROCEDURE — 36415 COLL VENOUS BLD VENIPUNCTURE: CPT

## 2022-02-28 RX ORDER — SODIUM CHLORIDE 0.9 % (FLUSH) 0.9 %
10 SYRINGE (ML) INJECTION AS NEEDED
Status: DISCONTINUED | OUTPATIENT
Start: 2022-02-28 | End: 2022-02-28 | Stop reason: HOSPADM

## 2022-03-01 DIAGNOSIS — N76.0 BV (BACTERIAL VAGINOSIS): Primary | ICD-10-CM

## 2022-03-01 DIAGNOSIS — B96.89 BV (BACTERIAL VAGINOSIS): Primary | ICD-10-CM

## 2022-03-01 RX ORDER — METRONIDAZOLE 500 MG/1
500 TABLET ORAL 2 TIMES DAILY
Qty: 14 TABLET | Refills: 0 | Status: SHIPPED | OUTPATIENT
Start: 2022-03-01 | End: 2022-03-02

## 2022-03-01 NOTE — TELEPHONE ENCOUNTER
Patient called requesting a follow up with . She states she started bleeding on Friday 02-25 like a period. She states it then turned into spotting when she wiped. She was seen in the ER yesterday for the spotting. She is currently having no pain and only spotting when she wipes. I scheduled her for a fu on Thursday with  and gave strict pain/bleeding precautions. Please advise if anything further should be done before her appt. She also tested positive for BV in the ER but was not treated. I have attached a prescription for flagyl.

## 2022-03-02 ENCOUNTER — OFFICE VISIT (OUTPATIENT)
Dept: OBSTETRICS AND GYNECOLOGY | Facility: CLINIC | Age: 27
End: 2022-03-02

## 2022-03-02 VITALS
SYSTOLIC BLOOD PRESSURE: 124 MMHG | BODY MASS INDEX: 21.68 KG/M2 | WEIGHT: 127 LBS | HEIGHT: 64 IN | HEART RATE: 102 BPM | DIASTOLIC BLOOD PRESSURE: 84 MMHG

## 2022-03-02 DIAGNOSIS — O36.80X0 ENCOUNTER TO DETERMINE FETAL VIABILITY OF PREGNANCY, SINGLE OR UNSPECIFIED FETUS: Primary | ICD-10-CM

## 2022-03-02 PROCEDURE — 99213 OFFICE O/P EST LOW 20 MIN: CPT | Performed by: STUDENT IN AN ORGANIZED HEALTH CARE EDUCATION/TRAINING PROGRAM

## 2022-03-02 NOTE — PROGRESS NOTES
"GYN Problem/Follow Up Visit    Chief Complaint   Patient presents with   • Follow-up     ER Spotting            HPI  Raysa Ellis is a 26 y.o. female, , who presents for evaluation for vaginal bleeding. + UPT at home last Wednesday. Was attempting pregnancy. Vaginal bleeding on Friday, no intercourse 72 hours prior. Bleeding has stopped. Was having light cramping associated.  Delivered on . Had SIDS in , no cause at this point in time. Has been having issues with coping. Not SI thoughts. Currently was taking Zoloft for depression following SIDS, stopped with + pregnancy test. She is taking a PNV.     Additional OB/GYN History   Patient's last menstrual period was 2022.  Current contraception: contraceptive methods: None  Desires to: do not start contraception  Allergies : Patient has no known allergies.     The additional following portions of the patient's history were reviewed and updated as appropriate: allergies, current medications, past family history, past medical history, past social history, past surgical history and problem list.    Review of Systems   Constitutional: Negative for fatigue and fever.   Respiratory: Negative for cough and shortness of breath.    Cardiovascular: Negative for chest pain.   Gastrointestinal: Negative for abdominal distention and abdominal pain.   Genitourinary: Negative for difficulty urinating, dysuria, pelvic pain, pelvic pressure and vaginal bleeding.       I have reviewed and agree with the HPI, ROS, and historical information as entered above. Eliot Gupta MD    Objective   /84   Pulse 102   Ht 162.6 cm (64\")   Wt 57.6 kg (127 lb)   LMP 2022   BMI 21.80 kg/m²     Physical Exam  Vitals and nursing note reviewed. Exam conducted with a chaperone present.   Constitutional:       General: She is not in acute distress.     Appearance: Normal appearance. She is not toxic-appearing.   HENT:      Head: Normocephalic and " atraumatic.   Eyes:      Extraocular Movements: Extraocular movements intact.      Conjunctiva/sclera: Conjunctivae normal.   Cardiovascular:      Pulses: Normal pulses.   Pulmonary:      Effort: Pulmonary effort is normal.   Abdominal:      General: There is no distension.      Palpations: Abdomen is soft.      Tenderness: There is no abdominal tenderness.   Genitourinary:     General: Normal vulva.      Exam position: Lithotomy position.      Labia:         Right: No tenderness, lesion or injury.         Left: No tenderness, lesion or injury.       Cervix: Normal.      Uterus: Normal.       Adnexa: Right adnexa normal and left adnexa normal.   Musculoskeletal:      Cervical back: Normal range of motion.   Skin:     General: Skin is warm and dry.   Neurological:      Mental Status: She is alert and oriented to person, place, and time.   Psychiatric:         Mood and Affect: Mood normal.         Behavior: Behavior normal.         Thought Content: Thought content normal.            Assessment and Plan  Raysa Ellis is a 26 y.o.  presenting for follow-up from ED for vaginal bleeding.  Patient had a positive pregnancy test.  Transvaginal ultrasound showed a gestational sac and yolk sac with no fetal pole.  Discussed that this may represent an early pregnancy versus a potential for miscarriage.  Bleeding precautions discussed with patient.  Follow-up with transvaginal ultrasound for 2 weeks for fetal viability.  Continue with prenatal vitamin    Diagnoses and all orders for this visit:    1. Encounter to determine fetal viability of pregnancy, single or unspecified fetus (Primary)  -     US Non-ob Transvaginal; Future        She understands the importance of having the above orders performed in a timely fashion.  The risks of not performing them include, but are not limited to, cancer and/or subsequent increase in morbidity and/or mortality.  She is encouraged to review her results online and/or contact or  office if she has questions.     Follow Up:  Return in about 4 weeks (around 3/30/2022) for Next scheduled follow up.      Eliot Gupta MD  03/02/2022

## 2022-03-08 ENCOUNTER — TELEPHONE (OUTPATIENT)
Dept: OBSTETRICS AND GYNECOLOGY | Facility: CLINIC | Age: 27
End: 2022-03-08

## 2022-03-08 RX ORDER — DIPHENHYDRAMINE HYDROCHLORIDE 25 MG/1
CAPSULE ORAL
Qty: 90 TABLET | Refills: 4 | Status: SHIPPED | OUTPATIENT
Start: 2022-03-08 | End: 2022-10-14

## 2022-03-08 NOTE — TELEPHONE ENCOUNTER
Called [facility] ( [phone]) Pyridoxine & Doxtlamine per protocol.  Preferred pharmacy in account is [facility] should be [facility]

## 2022-03-08 NOTE — TELEPHONE ENCOUNTER
Patient called requesting something for nausea.  Per protocol sent rx for Pyridoxine & Doxylamine.  Next appointment 3-30-22

## 2022-03-14 ENCOUNTER — TELEPHONE (OUTPATIENT)
Dept: FAMILY MEDICINE CLINIC | Facility: CLINIC | Age: 27
End: 2022-03-14

## 2022-03-14 NOTE — TELEPHONE ENCOUNTER
PATIENT MISSED APPOINTMENT ON 03/11/2022 @ 1:15 WITH JANET FANG. CALLED NUMBER IN CHART [phone]. NO ANSWER, LEFT MESSAGE EXPLAINING POLICY CONCERNING MISSED APPOINTMENTS AND SAME DAY CANCELLATIONS.

## 2022-03-15 ENCOUNTER — TELEPHONE (OUTPATIENT)
Dept: FAMILY MEDICINE CLINIC | Facility: CLINIC | Age: 27
End: 2022-03-15

## 2022-03-15 NOTE — TELEPHONE ENCOUNTER
PATIENT MISSED APPOINTMENT ON 03/11/2022 @ 1:15 WITH JANET FANG. CALLED NUMBER IN CHART [phone]. NO ANSWER- SECOND ATTEMPT - WILL SEND LETTER

## 2022-03-30 ENCOUNTER — TELEPHONE (OUTPATIENT)
Dept: OBSTETRICS AND GYNECOLOGY | Facility: CLINIC | Age: 27
End: 2022-03-30

## 2022-03-30 RX ORDER — ONDANSETRON 4 MG/1
TABLET, FILM COATED ORAL
Qty: 30 TABLET | Refills: 2 | Status: SHIPPED | OUTPATIENT
Start: 2022-03-30 | End: 2022-11-01 | Stop reason: HOSPADM

## 2022-03-30 NOTE — TELEPHONE ENCOUNTER
Patient called this am.  She is currently 10 weeks pregnant.  She is requesting Zofran for her nausea.  Patient states tried Pyridoxine, Doxylamine & Phenergan before and it doesn't help.  Per Protocol Rx for Zofran sent to pt pharmacy.  Next appointment 4-7-22.  Patient informed.

## 2022-04-13 ENCOUNTER — INITIAL PRENATAL (OUTPATIENT)
Dept: OBSTETRICS AND GYNECOLOGY | Facility: CLINIC | Age: 27
End: 2022-04-13

## 2022-04-13 VITALS — DIASTOLIC BLOOD PRESSURE: 69 MMHG | SYSTOLIC BLOOD PRESSURE: 113 MMHG | WEIGHT: 132 LBS | BODY MASS INDEX: 22.66 KG/M2

## 2022-04-13 DIAGNOSIS — Z34.90 ENCOUNTER FOR SUPERVISION OF NORMAL PREGNANCY, ANTEPARTUM, UNSPECIFIED GRAVIDITY: Primary | ICD-10-CM

## 2022-04-13 DIAGNOSIS — O21.9 NAUSEA AND VOMITING IN PREGNANCY: ICD-10-CM

## 2022-04-13 DIAGNOSIS — Z34.80 SUPERVISION OF OTHER NORMAL PREGNANCY, ANTEPARTUM: ICD-10-CM

## 2022-04-13 DIAGNOSIS — Z98.891 HX OF CESAREAN SECTION: ICD-10-CM

## 2022-04-13 DIAGNOSIS — Z84.82 FAMILY HISTORY OF SIDS (SUDDEN INFANT DEATH SYNDROME): ICD-10-CM

## 2022-04-13 PROBLEM — I83.93 ASYMPTOMATIC VARICOSE VEINS OF BOTH LOWER EXTREMITIES: Status: RESOLVED | Noted: 2021-10-07 | Resolved: 2022-04-13

## 2022-04-13 LAB
ABO GROUP BLD: NORMAL
BASOPHILS # BLD AUTO: 0.02 10*3/MM3 (ref 0–0.2)
BASOPHILS NFR BLD AUTO: 0.3 % (ref 0–1.5)
BLD GP AB SCN SERPL QL: NEGATIVE
C TRACH RRNA CVX QL NAA+PROBE: NOT DETECTED
DEPRECATED RDW RBC AUTO: 37.3 FL (ref 37–54)
EOSINOPHIL # BLD AUTO: 0.06 10*3/MM3 (ref 0–0.4)
EOSINOPHIL NFR BLD AUTO: 1 % (ref 0.3–6.2)
ERYTHROCYTE [DISTWIDTH] IN BLOOD BY AUTOMATED COUNT: 12.4 % (ref 12.3–15.4)
GLUCOSE UR STRIP-MCNC: NEGATIVE MG/DL
HBV SURFACE AG SERPL QL IA: NORMAL
HCT VFR BLD AUTO: 36.2 % (ref 34–46.6)
HCV AB SER DONR QL: NORMAL
HGB BLD-MCNC: 12.4 G/DL (ref 12–15.9)
HIV1+2 AB SER QL: NORMAL
IMM GRANULOCYTES # BLD AUTO: 0.03 10*3/MM3 (ref 0–0.05)
IMM GRANULOCYTES NFR BLD AUTO: 0.5 % (ref 0–0.5)
LYMPHOCYTES # BLD AUTO: 1.48 10*3/MM3 (ref 0.7–3.1)
LYMPHOCYTES NFR BLD AUTO: 25.5 % (ref 19.6–45.3)
MCH RBC QN AUTO: 28.8 PG (ref 26.6–33)
MCHC RBC AUTO-ENTMCNC: 34.3 G/DL (ref 31.5–35.7)
MCV RBC AUTO: 84 FL (ref 79–97)
MONOCYTES # BLD AUTO: 0.37 10*3/MM3 (ref 0.1–0.9)
MONOCYTES NFR BLD AUTO: 6.4 % (ref 5–12)
N GONORRHOEA RRNA SPEC QL NAA+PROBE: NOT DETECTED
NEUTROPHILS NFR BLD AUTO: 3.85 10*3/MM3 (ref 1.7–7)
NEUTROPHILS NFR BLD AUTO: 66.3 % (ref 42.7–76)
NRBC BLD AUTO-RTO: 0 /100 WBC (ref 0–0.2)
PLATELET # BLD AUTO: 167 10*3/MM3 (ref 140–450)
PMV BLD AUTO: 10.7 FL (ref 6–12)
PROT UR STRIP-MCNC: NEGATIVE MG/DL
RBC # BLD AUTO: 4.31 10*6/MM3 (ref 3.77–5.28)
RH BLD: POSITIVE
RPR SER QL: NORMAL
WBC NRBC COR # BLD: 5.81 10*3/MM3 (ref 3.4–10.8)

## 2022-04-13 PROCEDURE — 86803 HEPATITIS C AB TEST: CPT | Performed by: STUDENT IN AN ORGANIZED HEALTH CARE EDUCATION/TRAINING PROGRAM

## 2022-04-13 PROCEDURE — 81220 CFTR GENE COM VARIANTS: CPT | Performed by: STUDENT IN AN ORGANIZED HEALTH CARE EDUCATION/TRAINING PROGRAM

## 2022-04-13 PROCEDURE — 99214 OFFICE O/P EST MOD 30 MIN: CPT | Performed by: STUDENT IN AN ORGANIZED HEALTH CARE EDUCATION/TRAINING PROGRAM

## 2022-04-13 PROCEDURE — 87591 N.GONORRHOEAE DNA AMP PROB: CPT | Performed by: STUDENT IN AN ORGANIZED HEALTH CARE EDUCATION/TRAINING PROGRAM

## 2022-04-13 PROCEDURE — 87491 CHLMYD TRACH DNA AMP PROBE: CPT | Performed by: STUDENT IN AN ORGANIZED HEALTH CARE EDUCATION/TRAINING PROGRAM

## 2022-04-13 PROCEDURE — 87086 URINE CULTURE/COLONY COUNT: CPT | Performed by: STUDENT IN AN ORGANIZED HEALTH CARE EDUCATION/TRAINING PROGRAM

## 2022-04-13 PROCEDURE — G0432 EIA HIV-1/HIV-2 SCREEN: HCPCS | Performed by: STUDENT IN AN ORGANIZED HEALTH CARE EDUCATION/TRAINING PROGRAM

## 2022-04-13 PROCEDURE — 83020 HEMOGLOBIN ELECTROPHORESIS: CPT | Performed by: STUDENT IN AN ORGANIZED HEALTH CARE EDUCATION/TRAINING PROGRAM

## 2022-04-13 NOTE — PROGRESS NOTES
OB Initial Visit    CC- Here for care of current pregnancy     NIKOLAY Finalized: Due date finalized: U/S 3/17/2022    Subjective:  26 y.o.  presenting for her first obstetrical visit.    LMP: Patient's last menstrual period was 2022. sure    COMPLAINS OF: Pt has no complaints, is doing well    Raysa Ellis is a 26 y.o.  11w6d presenting for first NOB. Denies any complaints today. She has been taking Zofran to assist with nausea and vomiting. Did have early bleeding in pregnancy that resolved on its owm. Self discontinued Zoloft when she found out she was pregnant. Did have C/S in her G2, for fetal intolerance. Did have a  demise with SIDS in January of this year. No complications during pregnancy for all three. She had spontaneous labor at 36w4d in first pregnancy.       Reviewed and updated:  OBHx, GYNHx (STDs), PMHx, Medications, Allergies, PSHx, Social Hx, Preventative Hx (PAP), Hx of abuse/safe environment, Vaccine Hx including hx of chickenpox or vaccine, Genetic Hx (pt, FOB, both families).        Objective:  /69   Wt 59.9 kg (132 lb)   LMP 2022   BMI 22.66 kg/m²   General- NAD, alert and oriented, appropriate  Psych- Normal mood, good memory  Neck- No masses, no thyroid enlargement  CV- Regular rhythm, no murnurs  Resp- CTA to bases, no wheezes  Abdomen- Soft, non distended, non tender, no masses, well healed pfannenstiel incision     Breast left- No mass, non tender, no nipple discharge  Breast right- No mass, non tender, no nipple discharge    External genitalia- Normal, no lesions  Urethra- Normal, no masses, non tender  Vagina- Normal, no discharge  Bladder- Normal, no masses, non tender  Cvx- Normal, no lesions, no discharge, no CMT  Uterus- Normal shape and consistency, non tender  Adnexa- Normal, no mass, non tender    Lymphatic- No palpable neck, axillary, or groin nodes  Ext- No edema, no cyanosis    Skin- No lesions, no rashes, no acanthosis  nigricans      Assessment and Plan:  Diagnoses and all orders for this visit:    1. Encounter for supervision of normal pregnancy, antepartum, unspecified  (Primary)  -     OB Panel With HIV; Future  -     Hemoglobinopathy Fractionation Cascade; Future  -     POC Urinalysis Dipstick  -     INVITAE NIPS  -     Cystic Fibrosis Diagnostic Study    2. Hx of  section  Overview:  2022 G2, successful         3. Family history of SIDS (sudden infant death syndrome)  Overview:  2022 G3, passed in January; autopsy with no findings.       4. Nausea and vomiting in pregnancy  Overview:  2022 Improving with Zofran       5. Supervision of other normal pregnancy, antepartum  Overview:  Initial visit:  • PNL: 2022   • PAP/GC/CT/Urine culture: NILM; 2022 collected   • Blood type: O+  • Hx of HSV?: no    Genetic testing:    • CF and NIPS 2022     Vaccinations:  • COVID:  2022 recommended    24-28 weeks:  • 1hr GCT:  • Hct/Plt:  • Tdap Rx    Third Trimester:  • GBS  • Breast pump:    Ultrasound:  • Dating: 3/17/2022 NIKOLAY 10/27/2022  • Anatomy:   • Follow up:     PLAN:                  11w6d    Genetic Screening: CF  NIPS    Vaccines: Recommend COVID vaccine, R/B discussed    Counseling: Nutrition discussed, calories, activity/exercise in pregnancy  Discussed dietary restrictions/safety food preparation in pregnancy  Reviewed what to expect prenatal visits, office providers  Appropriate trimester precautions provided, N/V, vag bleeding, cramping  Questions answered    Labs: Prenatal labs, cultures, and PAP performed (prn)    Return in about 4 weeks (around 2022) for Next scheduled follow up.      Eliot Gupta MD  2022

## 2022-04-14 LAB
HGB A MFR BLD ELPH: 97 % (ref 96.4–98.8)
HGB A2 MFR BLD ELPH: 3 % (ref 1.8–3.2)
HGB F MFR BLD ELPH: 0 % (ref 0–2)
HGB FRACT BLD-IMP: NORMAL
HGB S MFR BLD ELPH: 0 %
RUBV IGG SERPL IA-ACNC: 1.78 INDEX

## 2022-04-15 LAB — BACTERIA SPEC AEROBE CULT: NO GROWTH

## 2022-04-22 PROBLEM — Z14.1 CYSTIC FIBROSIS CARRIER: Status: ACTIVE | Noted: 2022-04-22

## 2022-04-22 LAB
CFTR MUT ANL BLD/T: ABNORMAL
LABORATORY COMMENT REPORT: ABNORMAL

## 2022-05-20 ENCOUNTER — ROUTINE PRENATAL (OUTPATIENT)
Dept: OBSTETRICS AND GYNECOLOGY | Facility: CLINIC | Age: 27
End: 2022-05-20

## 2022-05-20 VITALS — SYSTOLIC BLOOD PRESSURE: 108 MMHG | BODY MASS INDEX: 22.76 KG/M2 | DIASTOLIC BLOOD PRESSURE: 68 MMHG | WEIGHT: 132.6 LBS

## 2022-05-20 DIAGNOSIS — O21.9 NAUSEA AND VOMITING IN PREGNANCY: ICD-10-CM

## 2022-05-20 DIAGNOSIS — Z14.1 CYSTIC FIBROSIS CARRIER: ICD-10-CM

## 2022-05-20 DIAGNOSIS — Z34.80 SUPERVISION OF OTHER NORMAL PREGNANCY, ANTEPARTUM: Primary | ICD-10-CM

## 2022-05-20 DIAGNOSIS — Z98.891 HX OF CESAREAN SECTION: ICD-10-CM

## 2022-05-20 LAB
GLUCOSE UR STRIP-MCNC: NEGATIVE MG/DL
PROT UR STRIP-MCNC: NEGATIVE MG/DL

## 2022-05-20 PROCEDURE — 99212 OFFICE O/P EST SF 10 MIN: CPT | Performed by: STUDENT IN AN ORGANIZED HEALTH CARE EDUCATION/TRAINING PROGRAM

## 2022-05-20 NOTE — PROGRESS NOTES
OB FOLLOW UP  Complaint   Chief Complaint   Patient presents with   • Routine Prenatal Visit            Raysa Ellis is a 26 y.o.  17w1d patient being seen today for her obstetrical follow up visit. Patient denies decreased fetal movement, contractions, loss of fluid or vaginal bleeding.  No other acute complaints.  Nausea vomiting is improved.  Only having a couple episodes per week.  Still helping with Zofran.  Partner is not gotten tested for cystic fibrosis does have plans to get tested.    Her prenatal care is complicated by (and status) :    Patient Active Problem List   Diagnosis   • Hx of  section   • Migraine headache   • Current severe episode of major depressive disorder without psychotic features without prior episode (HCC)   • Generalized anxiety disorder   • Family history of SIDS (sudden infant death syndrome)   • Supervision of other normal pregnancy, antepartum   • Nausea and vomiting in pregnancy   • Cystic fibrosis carrier       All other systems reviewed and are negative.     The additional following portions of the patient's history were reviewed and updated as appropriate: allergies, current medications, past family history, past medical history, past social history, past surgical history and problem list.      EXAM:     Vital signs: /68   Wt 60.1 kg (132 lb 9.6 oz)   LMP 2022   BMI 22.76 kg/m²   Appearance/psychiatric: To be in no distress  Constitutional: The patient is well nourished.  Cardiovascular: She does not have edema.  Respiratory: Respiratory effort is normal.  Gastrointestinal: Abdomen is soft, gravid, nontender, no rashes, heart tones are present, fundal height is size equals dates    Pelvic Exam: No    Urine glucose/protein: See prenatal flowsheet       Assessment and Plan    Problem List Items Addressed This Visit        Chromosomal and Congenital     Cystic fibrosis carrier       Gravid and     Hx of  section    Overview      2022 G2, successful              Supervision of other normal pregnancy, antepartum - Primary    Overview     Initial visit:  • PNL: 2022   • PAP/GC/CT/Urine culture: NILM; 2022 collected   • Blood type: O+  • Hx of HSV?: no    Genetic testin2022 - CF carrier  Low risk XX    Vaccinations:  • COVID:  2022 recommended    24-28 weeks:  • 1hr GCT:  • Hct/Plt:  • Tdap Rx    Third Trimester:  • GBS  • Breast pump:    Ultrasound:  • Dating: 3/17/2022 NIKOLAY 10/27/2022  • Anatomy: 2022 ordered  • Follow up:     PLAN:                 Relevant Orders    POC Urinalysis Dipstick (Completed)    US Ob Detail Fetal Anatomy Single or First Gestation          Impression  1. Pregnancy at 17w1d  2. Fetal status reassuring.   3. Activity and Exercise discussed.    Plan  1.  Recommendation for partner screening for cystic fibrosis  2.  Anatomy ultrasound ordered  3.  Follow-up 4 weeks      Patient was counseled to the following pregnancy precautions:  • Decreased fetal movement, if concern for decreased fetal movement please perform fetal kick counts you are looking for 10 movements in 2 hours.  If concern for fetal movement and not meeting that criteria, please present to triage for evaluation.  • Contractions occurring every 5 minutes for over an hour, lasting 30 to 60 seconds and progressively causing more discomfort, please seek medical attention to rule out labor  • If you believe that your water is broken, place a sanitary pad.  If pad fills in short period of time i.e. less than 5 minutes, take off pad placed another pad.  If this is saturated please present for rule out rupture of membranes  • Vaginal bleeding can be normal in pregnancy, this usually takes a form of spotting.  If having heavier bleeding like a menstrual period please present for evaluation; especially in light of severe abdominal pain this could represent a placental abruption.  • Keep all scheduled appointments as  recommended.        Eliot Gupta MD  05/20/2022

## 2022-05-31 ENCOUNTER — REFERRAL TRIAGE (OUTPATIENT)
Dept: LABOR AND DELIVERY | Facility: HOSPITAL | Age: 27
End: 2022-05-31

## 2022-06-07 ENCOUNTER — PATIENT OUTREACH (OUTPATIENT)
Dept: LABOR AND DELIVERY | Facility: HOSPITAL | Age: 27
End: 2022-06-07

## 2022-06-07 NOTE — OUTREACH NOTE
[facility]  Unable to Reach       Questions/Answers    Flowsheet Row Responses   Pending Outreach Confirm Patient Interest   Call Attempt First   Outcome No answer/busy   Next Call Attempt Date 06/14/22   Unable to reach comments: mailbox full, unable to accept any messages          Tomasa Tinajero RN  Maternity Nurse Navigator    6/7/2022, 13:07 EDT

## 2022-06-16 NOTE — PROGRESS NOTES
OB FOLLOW UP      Chief Complaint   Patient presents with   • Routine Prenatal Visit     Subjective:   • No complaints    Objective:  /71   Wt 61.7 kg (136 lb)   LMP 2022   BMI 23.34 kg/m²  4.082 kg (9 lb)  Uterine Size: not examined, US today  FHT: 110-160 BPM    See OB flow for edema, cvx exam if performed, and Upro/Uglu    Assessment and Plan:  21w0d  Reassuring pregnancy progress.  Questions answered.  Diagnoses and all orders for this visit:    1. Supervision of other normal pregnancy, antepartum (Primary)  Overview:  Initial visit:  • PNL: 2022   • PAP/GC/CT/Urine culture: NILM; 2022 collected   • Blood type: O+  • Hx of HSV?: no    Genetic testin2022 - CF carrier  Low risk XX  2022 Declines AFP    Vaccinations:  • COVID:  2022 recommended    24-28 weeks:  • 1hr GCT:  • Hct/Plt:  • Tdap Rx    Third Trimester:  • GBS  • Breast pump:    Ultrasound:  • Dating: 3/17/2022 NIKOLAY 10/27/2022 by LMP and 8week US  • Anatomy: 2022 CWD, BPD/HC 9-10%ile  • Follow up:     PLAN:  Hx CS w subsequent successful - plans - consents to be signed  Anxiety- no meds (stopped Zoloft).  2022 doing well  G3 SIDS  CF carrier- FOB 2022 tested  Small HC/BPD on anatomy US- recheck 4weeks    Orders:  -     POC Urinalysis Dipstick  -     US Ob 14 + Weeks Single or First Gestation; Future      Counseling:    • Second trimester precautions  • Continue PNV.  Importance of healthy eating and staying active.    Return in about 4 weeks (around 7/15/2022) for Follow up OB w US and glucola.  Then OV 3weeks later.            Chiquis Lincoln, DO  2022    [facility]  [address] [place] [address]  Dept: [phone]  Dept Fax: [phone]  Loc: [phone]  Loc Fax: [phone]

## 2022-06-17 ENCOUNTER — ROUTINE PRENATAL (OUTPATIENT)
Dept: OBSTETRICS AND GYNECOLOGY | Facility: CLINIC | Age: 27
End: 2022-06-17

## 2022-06-17 VITALS — BODY MASS INDEX: 23.34 KG/M2 | SYSTOLIC BLOOD PRESSURE: 123 MMHG | DIASTOLIC BLOOD PRESSURE: 71 MMHG | WEIGHT: 136 LBS

## 2022-06-17 DIAGNOSIS — O21.9 NAUSEA AND VOMITING IN PREGNANCY: ICD-10-CM

## 2022-06-17 DIAGNOSIS — Z84.82 FAMILY HISTORY OF SIDS (SUDDEN INFANT DEATH SYNDROME): ICD-10-CM

## 2022-06-17 DIAGNOSIS — Z34.80 SUPERVISION OF OTHER NORMAL PREGNANCY, ANTEPARTUM: Primary | ICD-10-CM

## 2022-06-17 DIAGNOSIS — Z14.1 CYSTIC FIBROSIS CARRIER: ICD-10-CM

## 2022-06-17 LAB
GLUCOSE UR STRIP-MCNC: NEGATIVE MG/DL
PROT UR STRIP-MCNC: NEGATIVE MG/DL

## 2022-06-17 PROCEDURE — 99213 OFFICE O/P EST LOW 20 MIN: CPT | Performed by: OBSTETRICS & GYNECOLOGY

## 2022-07-22 ENCOUNTER — ROUTINE PRENATAL (OUTPATIENT)
Dept: OBSTETRICS AND GYNECOLOGY | Facility: CLINIC | Age: 27
End: 2022-07-22

## 2022-07-22 VITALS — DIASTOLIC BLOOD PRESSURE: 69 MMHG | WEIGHT: 142 LBS | BODY MASS INDEX: 24.37 KG/M2 | SYSTOLIC BLOOD PRESSURE: 105 MMHG

## 2022-07-22 DIAGNOSIS — Z34.80 SUPERVISION OF OTHER NORMAL PREGNANCY, ANTEPARTUM: Primary | ICD-10-CM

## 2022-07-22 DIAGNOSIS — R93.89 ABNORMAL ULTRASOUND: ICD-10-CM

## 2022-07-22 DIAGNOSIS — Z14.1 CYSTIC FIBROSIS CARRIER: ICD-10-CM

## 2022-07-22 DIAGNOSIS — Z84.82 FAMILY HISTORY OF SIDS (SUDDEN INFANT DEATH SYNDROME): ICD-10-CM

## 2022-07-22 DIAGNOSIS — Z98.891 HX OF CESAREAN SECTION: ICD-10-CM

## 2022-07-22 LAB
GLUCOSE 1H P GLC SERPL-MCNC: 96 MG/DL (ref 65–139)
GLUCOSE UR STRIP-MCNC: NEGATIVE MG/DL
PROT UR STRIP-MCNC: NEGATIVE MG/DL

## 2022-07-22 PROCEDURE — 85027 COMPLETE CBC AUTOMATED: CPT | Performed by: STUDENT IN AN ORGANIZED HEALTH CARE EDUCATION/TRAINING PROGRAM

## 2022-07-22 PROCEDURE — 82950 GLUCOSE TEST: CPT | Performed by: STUDENT IN AN ORGANIZED HEALTH CARE EDUCATION/TRAINING PROGRAM

## 2022-07-22 PROCEDURE — 99214 OFFICE O/P EST MOD 30 MIN: CPT | Performed by: STUDENT IN AN ORGANIZED HEALTH CARE EDUCATION/TRAINING PROGRAM

## 2022-07-22 NOTE — PROGRESS NOTES
OB FOLLOW UP  Complaint   Chief Complaint   Patient presents with   • Routine Prenatal Visit            Raysa Ellis is a 26 y.o.  26w1d patient being seen today for her obstetrical follow up visit. Patient denies decreased fetal movement, contractions, loss of fluid or vaginal bleeding.  Patient compliant with prenatal vitamin as well as antiemetics as need be.  No other acute complaints.    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     The additional following portions of the patient's history were reviewed and updated as appropriate: allergies, current medications, past family history, past medical history, past social history, past surgical history and problem list.      EXAM:     Vital signs: /69   Wt 64.4 kg (142 lb)   LMP 2022   BMI 24.37 kg/m²   Appearance/psychiatric: To be in no distress  Constitutional: The patient is well nourished.  Cardiovascular: She does not have edema.  Respiratory: Respiratory effort is normal.  Gastrointestinal: Abdomen is soft, gravid, nontender, no rashes, heart tones are present, fundal height is size equals dates    Pelvic Exam: No    Urine glucose/protein: See prenatal flowsheet       Assessment and Plan    Problem List Items Addressed This Visit        Advance Directives and General Issues    Family history of SIDS (sudden infant death syndrome)    Overview     2022 G3, passed in January; autopsy with no findings.               Chromosomal and Congenital     Cystic fibrosis carrier       Gravid and     Hx of   section    Overview     2022 G2, successful              Supervision of other normal pregnancy, antepartum - Primary    Overview     Initial visit:  • PNL: 2022   • PAP/GC/CT/Urine culture: NILM; 2022 collected   • Blood type: O+  • Hx of HSV?: no    Genetic testin2022 - CF carrier  Low risk XX  2022 Declines AFP    Vaccinations:  • COVID:  2022 recommended    24-28 weeks: 2022   • 1hr GCT:  • Hct/Plt:  • Tdap Rx    Third Trimester:  • GBS  • Breast pump:    Ultrasound:  • Dating: 3/17/2022 NIKOLAY 10/27/2022 by LMP and 8week US  • Anatomy: 2022 CWD, BPD/HC 9-10%ile  • Follow up:  2022 EFW within normal limits BPD is in the 9th percentile head circumference is in the 5th percentile.  Referral to maternal-fetal medicine placed for concerns for microcephaly.     PLAN:  Hx CS w subsequent successful - plans - consents to be signed  Anxiety- no meds (stopped Zoloft).  2022 doing well  G3 SIDS  CF carrier- FOB 2022 tested  Small HC/BPD on anatomy US- recheck 4weeks               Relevant Orders    POC Urinalysis Dipstick (Completed)    Gestational Diabetes Screen 1 Hour    CBC (No Diff)      Other Visit Diagnoses     Abnormal ultrasound        Relevant Orders    Ambulatory Referral to Perinatology (Completed)          Impression  1. Pregnancy at 26w1d  2. Fetal status reassuring.   3. Activity and Exercise discussed.    Plan  1.  1 hour glucose tolerance testing and CBC today for second trimester labs.  2.  Follow-up ultrasound demonstrated lagging head circumference as well as biparietal diameter.  Referral for MFM placed for concerns for potential microcephaly otherwise fetal growth is appropriate  3.  Follow-up 2 weeks      Patient was counseled to the following pregnancy precautions:  • Decreased fetal movement, if concern for decreased fetal movement please perform fetal kick counts you are looking for 10 movements in 2 hours.  If concern  for fetal movement and not meeting that criteria, please present to triage for evaluation.  • Contractions occurring every 5 minutes for over an hour, lasting 30 to 60 seconds and progressively causing more discomfort, please seek medical attention to rule out labor  • If you believe that your water is broken, place a sanitary pad.  If pad fills in short period of time i.e. less than 5 minutes, take off pad placed another pad.  If this is saturated please present for rule out rupture of membranes  • Vaginal bleeding can be normal in pregnancy, this usually takes a form of spotting.  If having heavier bleeding like a menstrual period please present for evaluation; especially in light of severe abdominal pain this could represent a placental abruption.  • Keep all scheduled appointments as recommended.        Eliot Gupta MD  07/22/2022

## 2022-07-23 LAB
DEPRECATED RDW RBC AUTO: 40.3 FL (ref 37–54)
ERYTHROCYTE [DISTWIDTH] IN BLOOD BY AUTOMATED COUNT: 12.5 % (ref 12.3–15.4)
HCT VFR BLD AUTO: 33.8 % (ref 34–46.6)
HGB BLD-MCNC: 11.2 G/DL (ref 12–15.9)
MCH RBC QN AUTO: 29.6 PG (ref 26.6–33)
MCHC RBC AUTO-ENTMCNC: 33.1 G/DL (ref 31.5–35.7)
MCV RBC AUTO: 89.2 FL (ref 79–97)
PLATELET # BLD AUTO: 175 10*3/MM3 (ref 140–450)
PMV BLD AUTO: 10.6 FL (ref 6–12)
RBC # BLD AUTO: 3.79 10*6/MM3 (ref 3.77–5.28)
WBC NRBC COR # BLD: 8.3 10*3/MM3 (ref 3.4–10.8)

## 2022-08-31 ENCOUNTER — ROUTINE PRENATAL (OUTPATIENT)
Dept: OBSTETRICS AND GYNECOLOGY | Facility: CLINIC | Age: 27
End: 2022-08-31

## 2022-08-31 VITALS — BODY MASS INDEX: 25.88 KG/M2 | SYSTOLIC BLOOD PRESSURE: 104 MMHG | WEIGHT: 150.8 LBS | DIASTOLIC BLOOD PRESSURE: 69 MMHG

## 2022-08-31 DIAGNOSIS — Z34.80 SUPERVISION OF OTHER NORMAL PREGNANCY, ANTEPARTUM: Primary | ICD-10-CM

## 2022-08-31 DIAGNOSIS — Z84.82 FAMILY HISTORY OF SIDS (SUDDEN INFANT DEATH SYNDROME): ICD-10-CM

## 2022-08-31 DIAGNOSIS — Z14.1 CYSTIC FIBROSIS CARRIER: ICD-10-CM

## 2022-08-31 LAB
GLUCOSE UR STRIP-MCNC: NEGATIVE MG/DL
PROT UR STRIP-MCNC: NEGATIVE MG/DL

## 2022-08-31 PROCEDURE — 99213 OFFICE O/P EST LOW 20 MIN: CPT | Performed by: OBSTETRICS & GYNECOLOGY

## 2022-08-31 NOTE — PROGRESS NOTES
Chief Complaint:  Scheduled OB visit    HPI: 27 y.o.  at 31w6d   Positive baby movement  MFM has requested weekly ultrasounds for Doppler studies for growth.  Infant fits criteria for fetal growth restriction due to abdominal circumference below 10th percentile, currently 8th percentile.  No evidence of microcephaly noted.    Vitals:    22 1159   BP: 104/69   Weight: 68.4 kg (150 lb 12.8 oz)       See OB flowsheet also for pregnancy related data.    A/P  Intrauterine pregnancy at 31w6d   Diagnoses and all orders for this visit:    1. Supervision of other normal pregnancy, antepartum (Primary)  Overview:  Initial visit:  • PNL: 2022   • PAP/GC/CT/Urine culture: NILM; 2022 collected   • Blood type: O+  • Hx of HSV?: no    Genetic testin2022 - CF carrier  Low risk XX  2022 Declines AFP    Vaccinations:  • COVID:  2022 recommended    24-28 weeks: 2022   • 1hr GCT:  • Hct/Plt:  • Tdap Rx    Third Trimester:  • GBS  • Breast pump:    Ultrasound:  • Dating: 3/17/2022 NIKOLAY 10/27/2022 by LMP and 8week US  • Anatomy: 2022 CWD, BPD/HC 9-10%ile  • Follow up:  2022 EFW within normal limits BPD is in the 9th percentile head circumference is in the 5th percentile.  Referral to maternal-fetal medicine placed for concerns for microcephaly.     PLAN:  Hx CS w subsequent successful - plans - consents to be signed  Anxiety- no meds (stopped Zoloft).  2022 doing well  G3 SIDS  CF carrier- FOB 2022 tested  Small HC/BPD on anatomy US- recheck 4weeks        Orders:  -     POC Urinalysis Dipstick    2. Family history of SIDS (sudden infant death syndrome)  Overview:  2022 G3, passed in January; autopsy with no findings.       3. Cystic fibrosis carrier      Continue prenatal vitamins.  Encouraged fetal kick counts, 10 movements in 2 hours every day.  To labor and delivery if lack fetal movement    PLAN:   Return in about 2 weeks (around 2022).    Noe BOLTON  Sr. Carlos MD  8/31/2022 12:27 EDT

## 2022-09-14 ENCOUNTER — ROUTINE PRENATAL (OUTPATIENT)
Dept: OBSTETRICS AND GYNECOLOGY | Facility: CLINIC | Age: 27
End: 2022-09-14

## 2022-09-14 VITALS — SYSTOLIC BLOOD PRESSURE: 98 MMHG | BODY MASS INDEX: 25.58 KG/M2 | WEIGHT: 149 LBS | DIASTOLIC BLOOD PRESSURE: 66 MMHG

## 2022-09-14 DIAGNOSIS — Z34.80 SUPERVISION OF OTHER NORMAL PREGNANCY, ANTEPARTUM: Primary | ICD-10-CM

## 2022-09-14 DIAGNOSIS — Z98.891 HX OF CESAREAN SECTION: ICD-10-CM

## 2022-09-14 DIAGNOSIS — Z14.1 CYSTIC FIBROSIS CARRIER: ICD-10-CM

## 2022-09-14 LAB
GLUCOSE UR STRIP-MCNC: NEGATIVE MG/DL
PROT UR STRIP-MCNC: NEGATIVE MG/DL

## 2022-09-14 PROCEDURE — 99212 OFFICE O/P EST SF 10 MIN: CPT | Performed by: STUDENT IN AN ORGANIZED HEALTH CARE EDUCATION/TRAINING PROGRAM

## 2022-09-14 NOTE — PROGRESS NOTES
OB FOLLOW UP  Complaint   Chief Complaint   Patient presents with   • Routine Prenatal Visit      scar on left side feels hard,tenderness x1 wk             Raysa Ellis is a 27 y.o.  33w6d patient being seen today for her obstetrical follow up visit. Patient denies decreased fetal movement, contractions, loss of fluid or vaginal bleeding.  No acute complaints.  Was seen by MFM on .  Released from their care after appropriate growth and no concern for placenta accreta.     Her prenatal care is complicated by (and status) :    Patient Active Problem List   Diagnosis   • Hx of  section   • Migraine headache   • Generalized anxiety disorder   • Family history of SIDS (sudden infant death syndrome)   • Supervision of other normal pregnancy, antepartum   • Cystic fibrosis carrier       All other systems reviewed and are negative.     The additional following portions of the patient's history were reviewed and updated as appropriate: allergies, current medications, past family history, past medical history, past social history, past surgical history and problem list.      EXAM:     Vital signs: BP 98/66   Wt 67.6 kg (149 lb)   LMP 2022   BMI 25.58 kg/m²   Appearance/psychiatric: To be in no distress  Constitutional: The patient is well nourished.  Cardiovascular: She does not have edema.  Respiratory: Respiratory effort is normal.  Gastrointestinal: Abdomen is soft, gravid, nontender, no rashes, heart tones are present, fundal height is size equals dates    Pelvic Exam: No    Urine glucose/protein: See prenatal flowsheet       Assessment and Plan    Problem List Items Addressed This Visit        Chromosomal and Congenital     Cystic fibrosis carrier    Overview     2022 partner negative             Gravid and     Hx of  section    Overview     2022 G2, successful            Supervision of other normal pregnancy, antepartum - Primary    Overview      Initial visit:  • PNL: 2022   • PAP/GC/CT/Urine culture: NILM; 2022 collected   • Blood type: O+  • Hx of HSV?: no    Genetic testin2022 - CF carrier  Low risk XX  2022 Declines AFP    Vaccinations:  • COVID:  2022 recommended    24-28 weeks: 2022   • 1hr GCT:  • Hct/Plt:  • Tdap Rx    Third Trimester:  • GBS  • Breast pump:    Ultrasound:  • Dating: 3/17/2022 NIKOLAY 10/27/2022 by LMP and 8week US  • Anatomy: 2022 CWD, BPD/HC 9-10%ile  • Follow up:  2022 EFW within normal limits BPD is in the 9th percentile head circumference is in the 5th percentile.  Referral to maternal-fetal medicine placed for concerns for microcephaly.     PLAN:  Hx CS w subsequent successful - plans - consents to be signed  Anxiety- no meds (stopped Zoloft).  2022 doing well  G3 SIDS  CF carrier- FOB 2022 tested  Small HC/BPD on anatomy US- recheck 4weeks             Relevant Orders    POC Urinalysis Dipstick (Completed)          Impression  1. Pregnancy at 33w6d  2. Fetal status reassuring.   3. Activity and Exercise discussed.    Plan  1.  Review of MFM notation confirming released from their care  2.  Recommendation for Tdap administration, prescription provided  3.  Follow-up 2 weeks        Patient was counseled to the following pregnancy precautions:  • Decreased fetal movement, if concern for decreased fetal movement please perform fetal kick counts you are looking for 10 movements in 2 hours.  If concern for fetal movement and not meeting that criteria, please present to triage for evaluation.  • Contractions occurring every 5 minutes for over an hour, lasting 30 to 60 seconds and progressively causing more discomfort, please seek medical attention to rule out labor  • If you believe that your water is broken, place a sanitary pad.  If pad fills in short period of time i.e. less than 5 minutes, take off pad placed another pad.  If this is saturated please present for rule  out rupture of membranes  • Vaginal bleeding can be normal in pregnancy, this usually takes a form of spotting.  If having heavier bleeding like a menstrual period please present for evaluation; especially in light of severe abdominal pain this could represent a placental abruption.  • Keep all scheduled appointments as recommended.        Eliot Gupta MD  09/14/2022

## 2022-09-30 ENCOUNTER — ROUTINE PRENATAL (OUTPATIENT)
Dept: OBSTETRICS AND GYNECOLOGY | Facility: CLINIC | Age: 27
End: 2022-09-30

## 2022-09-30 VITALS — WEIGHT: 155 LBS | SYSTOLIC BLOOD PRESSURE: 137 MMHG | BODY MASS INDEX: 26.61 KG/M2 | DIASTOLIC BLOOD PRESSURE: 83 MMHG

## 2022-09-30 DIAGNOSIS — O12.13 PROTEINURIA AFFECTING PREGNANCY IN THIRD TRIMESTER: ICD-10-CM

## 2022-09-30 DIAGNOSIS — Z84.82 FAMILY HISTORY OF SIDS (SUDDEN INFANT DEATH SYNDROME): ICD-10-CM

## 2022-09-30 DIAGNOSIS — Z34.80 SUPERVISION OF OTHER NORMAL PREGNANCY, ANTEPARTUM: Primary | ICD-10-CM

## 2022-09-30 DIAGNOSIS — Z14.1 CYSTIC FIBROSIS CARRIER: ICD-10-CM

## 2022-09-30 PROBLEM — O26.849 UTERINE SIZE DATE DISCREPANCY PREGNANCY: Status: ACTIVE | Noted: 2022-09-30

## 2022-09-30 LAB
GLUCOSE UR STRIP-MCNC: ABNORMAL MG/DL
PROT UR STRIP-MCNC: ABNORMAL MG/DL

## 2022-09-30 PROCEDURE — 87081 CULTURE SCREEN ONLY: CPT | Performed by: STUDENT IN AN ORGANIZED HEALTH CARE EDUCATION/TRAINING PROGRAM

## 2022-09-30 PROCEDURE — 99214 OFFICE O/P EST MOD 30 MIN: CPT | Performed by: STUDENT IN AN ORGANIZED HEALTH CARE EDUCATION/TRAINING PROGRAM

## 2022-09-30 NOTE — PROGRESS NOTES
OB FOLLOW UP  Complaint   Chief Complaint   Patient presents with   • Routine Prenatal Visit            Raysa Ellis is a 27 y.o.  36w1d patient being seen today for her obstetrical follow up visit. Patient denies decreased fetal movement, contractions, loss of fluid or vaginal bleeding.  Her Pfannenstiel incision has felt hard to her for the past week.  Not overtly painful.  Patient denies any headache, visual disturbances, new onset nausea vomiting, right upper quadrant pain, or new onset swelling.      Her prenatal care is complicated by (and status) :    Patient Active Problem List   Diagnosis   • Hx of  section   • Migraine headache   • Generalized anxiety disorder   • Family history of SIDS (sudden infant death syndrome)   • Supervision of other normal pregnancy, antepartum   • Cystic fibrosis carrier   • Proteinuria affecting pregnancy in third trimester   • Uterine size date discrepancy pregnancy       All other systems reviewed and are negative.     The additional following portions of the patient's history were reviewed and updated as appropriate: allergies, current medications, past family history, past medical history, past social history, past surgical history and problem list.      EXAM:     Vital signs: /83   Wt 70.3 kg (155 lb)   LMP 2022   BMI 26.61 kg/m²   Appearance/psychiatric: To be in no distress  Constitutional: The patient is well nourished.  Cardiovascular: She does not have edema.  Respiratory: Respiratory effort is normal.  Gastrointestinal: Abdomen is soft, gravid, nontender, no rashes, heart tones are present, fundal height is size less than dates    Pelvic Exam: Yes.  Presentation: cephalic. Dilation: 1cm. Effacement: 50%. Station: -3.    Urine glucose/protein: See prenatal flowsheet       Assessment and Plan    Problem List Items Addressed This Visit        Advance Directives and General Issues    Family history of SIDS (sudden infant death syndrome)     Overview     2022 G3, passed in January; autopsy with no findings.             Chromosomal and Congenital     Cystic fibrosis carrier    Overview     2022 partner negative             Gravid and     Supervision of other normal pregnancy, antepartum - Primary    Overview     Initial visit:  • PNL: 2022   • PAP/GC/CT/Urine culture: NILM; 2022 collected   • Blood type: O+  • Hx of HSV?: no    Genetic testin2022 - CF carrier  Low risk XX  2022 Declines AFP    Vaccinations:  • COVID:  2022 recommended    24-28 weeks: 2022   • 1hr GCT:  • Hct/Plt:  • Tdap Rx    Third Trimester:  • GBS  • Breast pump:    Ultrasound:  • Dating: 3/17/2022 NIKOLAY 10/27/2022 by LMP and 8week US  • Anatomy: 2022 CWD, BPD/HC 9-10%ile  • Follow up:  2022 EFW within normal limits BPD is in the 9th percentile head circumference is in the 5th percentile.  Referral to maternal-fetal medicine placed for concerns for microcephaly.     PLAN:  Hx CS w subsequent successful - plans - consents to be signed  Anxiety- no meds (stopped Zoloft).  2022 doing well  G3 SIDS  CF carrier- FOB 2022 tested  Small HC/BPD on anatomy US- recheck 4weeks             Relevant Orders    POC Urinalysis Dipstick (Completed)    Group B Streptococcus Culture - Swab, Vaginal/Rectum    US Ob Limited 1 + Fetuses    Proteinuria affecting pregnancy in third trimester          Impression  1. Pregnancy at 36w1d  2. Fetal status reassuring.   3. Activity and Exercise discussed.    Plan  1.  Follow-up ultrasound for uterine size discrepancy  2.  Schedule for induction of labor on  at 40 weeks and 4 days.  Patient with history of  section with successful vaginal birth after .  Desires attempt at another vaginal birth after .  3.  Follow-up 1 week  4.  GBS collected today      Patient was counseled to the following pregnancy precautions:  • Decreased fetal movement, if  concern for decreased fetal movement please perform fetal kick counts you are looking for 10 movements in 2 hours.  If concern for fetal movement and not meeting that criteria, please present to triage for evaluation.  • Contractions occurring every 5 minutes for over an hour, lasting 30 to 60 seconds and progressively causing more discomfort, please seek medical attention to rule out labor  • If you believe that your water is broken, place a sanitary pad.  If pad fills in short period of time i.e. less than 5 minutes, take off pad placed another pad.  If this is saturated please present for rule out rupture of membranes  • Vaginal bleeding can be normal in pregnancy, this usually takes a form of spotting.  If having heavier bleeding like a menstrual period please present for evaluation; especially in light of severe abdominal pain this could represent a placental abruption.  • Keep all scheduled appointments as recommended.        Eliot Gupta MD  09/30/2022

## 2022-10-03 LAB — BACTERIA SPEC AEROBE CULT: NORMAL

## 2022-10-05 ENCOUNTER — TELEPHONE (OUTPATIENT)
Dept: OBSTETRICS AND GYNECOLOGY | Facility: CLINIC | Age: 27
End: 2022-10-05

## 2022-10-05 NOTE — TELEPHONE ENCOUNTER
Spoke with the patient and advised her to have something with them to keep them occupied during the scan.

## 2022-10-05 NOTE — TELEPHONE ENCOUNTER
Caller: Raysa Ellis    Relationship to patient: Self    Best call back number: [phone]-CALL ANYTIME, IT IS OKAY TO LVM.    Patient is needing: PT IS SCHEDULED FOR AN ULTRASOUND AND OB FOLLOW UP W/DR ELDRIDGE ON 10.06.22. PT'S 10 YEAR OLD CHILD IS ON FALL BREAK.PT WOULD LIKE TO KNOW. IF CHILD CAN WAIT IN THE WAITING ROOM DURING THE ULTRASOUND AND GO BACK WITH PATIENT DURING THE PROVIDER VISIT WITH DR ELDRIDGE?  UNABLE TO W/T

## 2022-10-06 ENCOUNTER — ROUTINE PRENATAL (OUTPATIENT)
Dept: OBSTETRICS AND GYNECOLOGY | Facility: CLINIC | Age: 27
End: 2022-10-06

## 2022-10-06 VITALS — WEIGHT: 152 LBS | DIASTOLIC BLOOD PRESSURE: 76 MMHG | SYSTOLIC BLOOD PRESSURE: 118 MMHG | BODY MASS INDEX: 26.09 KG/M2

## 2022-10-06 DIAGNOSIS — Z14.1 CYSTIC FIBROSIS CARRIER: ICD-10-CM

## 2022-10-06 DIAGNOSIS — O12.13 PROTEINURIA AFFECTING PREGNANCY IN THIRD TRIMESTER: ICD-10-CM

## 2022-10-06 DIAGNOSIS — Z84.82 FAMILY HISTORY OF SIDS (SUDDEN INFANT DEATH SYNDROME): ICD-10-CM

## 2022-10-06 DIAGNOSIS — Z34.80 SUPERVISION OF OTHER NORMAL PREGNANCY, ANTEPARTUM: Primary | ICD-10-CM

## 2022-10-06 DIAGNOSIS — O26.849 UTERINE SIZE DATE DISCREPANCY PREGNANCY: ICD-10-CM

## 2022-10-06 DIAGNOSIS — Z98.891 HX OF CESAREAN SECTION: ICD-10-CM

## 2022-10-06 LAB
GLUCOSE UR STRIP-MCNC: NEGATIVE MG/DL
PROT UR STRIP-MCNC: NEGATIVE MG/DL

## 2022-10-06 PROCEDURE — 99212 OFFICE O/P EST SF 10 MIN: CPT | Performed by: STUDENT IN AN ORGANIZED HEALTH CARE EDUCATION/TRAINING PROGRAM

## 2022-10-06 NOTE — PROGRESS NOTES
OB FOLLOW UP  Complaint   Chief Complaint   Patient presents with   • Routine Prenatal Visit     michelle Ellis is a 27 y.o.  37w0d patient being seen today for her obstetrical follow up visit. Patient denies decreased fetal movement, contractions, loss of fluid or vaginal bleeding.  No acute complaints.    Her prenatal care is complicated by (and status) :    Patient Active Problem List   Diagnosis   • Hx of  section   • Migraine headache   • Generalized anxiety disorder   • Family history of SIDS (sudden infant death syndrome)   • Supervision of other normal pregnancy, antepartum   • Cystic fibrosis carrier   • Proteinuria affecting pregnancy in third trimester   • Uterine size date discrepancy pregnancy       All other systems reviewed and are negative.     The additional following portions of the patient's history were reviewed and updated as appropriate: allergies, current medications, past family history, past medical history, past social history, past surgical history and problem list.      EXAM:     Vital signs: /76   Wt 68.9 kg (152 lb)   LMP 2022   BMI 26.09 kg/m²   Appearance/psychiatric: To be in no distress  Constitutional: The patient is well nourished.  Cardiovascular: She does not have edema.  Respiratory: Respiratory effort is normal.  Gastrointestinal: Abdomen is soft, gravid, nontender, no rashes, heart tones are present, fundal height is size equals dates    Pelvic Exam: Yes.  Presentation: cephalic. Dilation: 2cm. Effacement: 60. Station: -3.    Urine glucose/protein: See prenatal flowsheet       Assessment and Plan    Problem List Items Addressed This Visit        Advance Directives and General Issues    Family history of SIDS (sudden infant death syndrome)    Overview     2022 G3, passed in January; autopsy with no findings.             Chromosomal and Congenital     Cystic fibrosis carrier    Overview     2022 partner negative              Gravid and     Hx of  section    Overview     2022 G2, successful            Supervision of other normal pregnancy, antepartum - Primary    Overview     Initial visit:  • PNL: 2022   • PAP/GC/CT/Urine culture: NILM; 2022 collected   • Blood type: O+  • Hx of HSV?: no    Genetic testin2022 - CF carrier  Low risk XX  2022 Declines AFP    Vaccinations:  • COVID:  2022 recommended    24-28 weeks: 2022   • 1hr GCT:  • Hct/Plt:  • Tdap Rx    Third Trimester:  • GBS  • Breast pump:    Ultrasound:  • Dating: 3/17/2022 NIKOLAY 10/27/2022 by LMP and 8week US  • Anatomy: 2022 CWD, BPD/HC 9-10%ile  • Follow up:  2022 EFW within normal limits BPD is in the 9th percentile head circumference is in the 5th percentile.  Referral to maternal-fetal medicine placed for concerns for microcephaly.     PLAN:  Hx CS w subsequent successful - plans - consents to be signed  Anxiety- no meds (stopped Zoloft).  2022 doing well  G3 SIDS  CF carrier- FOB 2022 tested  Small HC/BPD on anatomy US- recheck 4weeks             Relevant Orders    POC Urinalysis Dipstick (Completed)    Proteinuria affecting pregnancy in third trimester    Overview     10/6/2022 no protein in urine 37-week appointment         Uterine size date discrepancy pregnancy    Overview     10/6/2022 follow-up ultrasound 46 percentile for estimated fetal weight 6 pounds 9 ounces.  AC 73rd percentile JULIA normal.  Vertex presentation anterior placenta               Impression  1. Pregnancy at 37w0d  2. Fetal status reassuring.   3. Activity and Exercise discussed.    Plan  1.  Review of ultrasound with appropriate growth.  2.  Follow-up 1 week  3.  Induction of labor set for 2022      Patient was counseled to the following pregnancy precautions:  • Decreased fetal movement, if concern for decreased fetal movement please perform fetal kick counts you are looking for 10 movements in 2  hours.  If concern for fetal movement and not meeting that criteria, please present to triage for evaluation.  • Contractions occurring every 5 minutes for over an hour, lasting 30 to 60 seconds and progressively causing more discomfort, please seek medical attention to rule out labor  • If you believe that your water is broken, place a sanitary pad.  If pad fills in short period of time i.e. less than 5 minutes, take off pad placed another pad.  If this is saturated please present for rule out rupture of membranes  • Vaginal bleeding can be normal in pregnancy, this usually takes a form of spotting.  If having heavier bleeding like a menstrual period please present for evaluation; especially in light of severe abdominal pain this could represent a placental abruption.  • Keep all scheduled appointments as recommended.        Eliot Gupta MD  10/06/2022

## 2022-10-13 ENCOUNTER — TELEPHONE (OUTPATIENT)
Dept: FAMILY MEDICINE CLINIC | Facility: CLINIC | Age: 27
End: 2022-10-13

## 2022-10-13 NOTE — TELEPHONE ENCOUNTER
Called patient back unfortunately there is not any openings available for today. Recommended that the patient to go to an urgent care . She stated that she understood.

## 2022-10-13 NOTE — TELEPHONE ENCOUNTER
Hub staff attempted to follow warm transfer process and was unsuccessful     Caller: Raysa Ellis    Relationship to patient: Self    Best call back number: [phone]    Patient is needing: PATIENT STATED SHE HAS PAIN IN BOTH EARS AND WOULD LIKE A CALL BACK TO SCHEDULE AN APPOINTMENT ASAP

## 2022-10-14 ENCOUNTER — ROUTINE PRENATAL (OUTPATIENT)
Dept: OBSTETRICS AND GYNECOLOGY | Facility: CLINIC | Age: 27
End: 2022-10-14

## 2022-10-14 VITALS — DIASTOLIC BLOOD PRESSURE: 78 MMHG | WEIGHT: 154 LBS | BODY MASS INDEX: 26.43 KG/M2 | SYSTOLIC BLOOD PRESSURE: 117 MMHG

## 2022-10-14 DIAGNOSIS — Z14.1 CYSTIC FIBROSIS CARRIER: ICD-10-CM

## 2022-10-14 DIAGNOSIS — Z34.80 SUPERVISION OF OTHER NORMAL PREGNANCY, ANTEPARTUM: Primary | ICD-10-CM

## 2022-10-14 DIAGNOSIS — O12.13 PROTEINURIA AFFECTING PREGNANCY IN THIRD TRIMESTER: ICD-10-CM

## 2022-10-14 DIAGNOSIS — Z84.82 FAMILY HISTORY OF SIDS (SUDDEN INFANT DEATH SYNDROME): ICD-10-CM

## 2022-10-14 DIAGNOSIS — O26.849 UTERINE SIZE DATE DISCREPANCY PREGNANCY: ICD-10-CM

## 2022-10-14 LAB
GLUCOSE UR STRIP-MCNC: NEGATIVE MG/DL
PROT UR STRIP-MCNC: ABNORMAL MG/DL

## 2022-10-14 PROCEDURE — 99212 OFFICE O/P EST SF 10 MIN: CPT | Performed by: STUDENT IN AN ORGANIZED HEALTH CARE EDUCATION/TRAINING PROGRAM

## 2022-10-14 NOTE — PROGRESS NOTES
OB FOLLOW UP  Complaint   Chief Complaint   Patient presents with   • Routine Prenatal Visit            Raysa Ellis is a 27 y.o.  38w1d patient being seen today for her obstetrical follow up visit. Patient denies decreased fetal movement, contractions, loss of fluid or vaginal bleeding.  Patient was having ear pain started on left then gradually became both ears.  Went to urgent care diagnosed with ear infection.  Given amoxicillin.  Reports that he is feeling better.  No other acute complaints.    Her prenatal care is complicated by (and status) :    Patient Active Problem List   Diagnosis   • Hx of  section   • Migraine headache   • Generalized anxiety disorder   • Family history of SIDS (sudden infant death syndrome)   • Supervision of other normal pregnancy, antepartum   • Cystic fibrosis carrier   • Proteinuria affecting pregnancy in third trimester   • Uterine size date discrepancy pregnancy       All other systems reviewed and are negative.     The additional following portions of the patient's history were reviewed and updated as appropriate: allergies, current medications, past family history, past medical history, past social history, past surgical history and problem list.      EXAM:     Vital signs: /78   Wt 69.9 kg (154 lb)   LMP 2022   BMI 26.43 kg/m²   Appearance/psychiatric: To be in no distress  Constitutional: The patient is well nourished.  Cardiovascular: She does not have edema.  Respiratory: Respiratory effort is normal.  Gastrointestinal: Abdomen is soft, gravid, nontender, no rashes, heart tones are present, fundal height is size equals dates    Pelvic Exam: Patient deferred    Urine glucose/protein: See prenatal flowsheet       Assessment and Plan    Problem List Items Addressed This Visit        Advance Directives and General Issues    Family history of SIDS (sudden infant death syndrome)    Overview     2022 G3, passed in January; autopsy with no  findings.             Chromosomal and Congenital     Cystic fibrosis carrier    Overview     2022 partner negative             Gravid and     Supervision of other normal pregnancy, antepartum - Primary    Overview     Initial visit:  • PNL: 2022   • PAP/GC/CT/Urine culture: NILM; 2022 collected   • Blood type: O+  • Hx of HSV?: no    Genetic testin2022 - CF carrier  Low risk XX  2022 Declines AFP    Vaccinations:  • COVID:  2022 recommended    24-28 weeks: 2022   • 1hr GCT:  • Hct/Plt:  • Tdap Rx    Third Trimester:  • GBS  • Breast pump:    Ultrasound:  • Dating: 3/17/2022 NIKOLAY 10/27/2022 by LMP and 8week US  • Anatomy: 2022 CWD, BPD/HC 9-10%ile  • Follow up:  2022 EFW within normal limits BPD is in the 9th percentile head circumference is in the 5th percentile.  Referral to maternal-fetal medicine placed for concerns for microcephaly.     PLAN:  Hx CS w subsequent successful - plans - consents to be signed  Anxiety- no meds (stopped Zoloft).  2022 doing well  G3 SIDS  CF carrier- FOB 2022 tested  Small HC/BPD on anatomy US- recheck 4weeks             Relevant Orders    POC Urinalysis Dipstick (Completed)    Proteinuria affecting pregnancy in third trimester    Overview     10/6/2022 no protein in urine 37-week appointment  10/14/2022  Asx; 1+ @ 38 weeks BP normotensive          Uterine size date discrepancy pregnancy    Overview     10/6/2022 follow-up ultrasound 46 percentile for estimated fetal weight 6 pounds 9 ounces.  AC 73rd percentile JULIA normal.  Vertex presentation anterior placenta  10/14/2022 Measuring 36 on FH            Impression  1. Pregnancy at 38w1d  2. Fetal status reassuring.   3. Activity and Exercise discussed.    Plan  1.  1+ protein in urine, blood pressure is normotensive.  Preeclampsia warning signs discussed.  2.  Follow-up 1 week, induction of labor set for .  3.  Discussion of TOLAC.  Patient desires  to attempt since she successfully had a .  Discussed risk of uterine rupture less than 1%.      Patient was counseled to the following pregnancy precautions:  • Decreased fetal movement, if concern for decreased fetal movement please perform fetal kick counts you are looking for 10 movements in 2 hours.  If concern for fetal movement and not meeting that criteria, please present to triage for evaluation.  • Contractions occurring every 5 minutes for over an hour, lasting 30 to 60 seconds and progressively causing more discomfort, please seek medical attention to rule out labor  • If you believe that your water is broken, place a sanitary pad.  If pad fills in short period of time i.e. less than 5 minutes, take off pad placed another pad.  If this is saturated please present for rule out rupture of membranes  • Vaginal bleeding can be normal in pregnancy, this usually takes a form of spotting.  If having heavier bleeding like a menstrual period please present for evaluation; especially in light of severe abdominal pain this could represent a placental abruption.  • Keep all scheduled appointments as recommended.        Eliot Gupta MD  10/14/2022

## 2022-10-21 ENCOUNTER — HOSPITAL ENCOUNTER (INPATIENT)
Facility: HOSPITAL | Age: 27
LOS: 1 days | Discharge: HOME | DRG: 833 | End: 2022-10-21
Attending: OBSTETRICS & GYNECOLOGY | Admitting: OBSTETRICS & GYNECOLOGY
Payer: COMMERCIAL

## 2022-10-21 ENCOUNTER — ROUTINE PRENATAL (OUTPATIENT)
Dept: OBSTETRICS AND GYNECOLOGY | Facility: CLINIC | Age: 27
End: 2022-10-21

## 2022-10-21 ENCOUNTER — APPOINTMENT (OUTPATIENT)
Dept: CARDIOLOGY | Facility: HOSPITAL | Age: 27
DRG: 833 | End: 2022-10-21
Payer: COMMERCIAL

## 2022-10-21 VITALS — WEIGHT: 157 LBS | DIASTOLIC BLOOD PRESSURE: 72 MMHG | SYSTOLIC BLOOD PRESSURE: 114 MMHG | BODY MASS INDEX: 26.95 KG/M2

## 2022-10-21 VITALS — RESPIRATION RATE: 16 BRPM | HEART RATE: 123 BPM | SYSTOLIC BLOOD PRESSURE: 113 MMHG | DIASTOLIC BLOOD PRESSURE: 71 MMHG

## 2022-10-21 DIAGNOSIS — Z98.891 HX OF CESAREAN SECTION: ICD-10-CM

## 2022-10-21 DIAGNOSIS — O12.13 PROTEINURIA AFFECTING PREGNANCY IN THIRD TRIMESTER: ICD-10-CM

## 2022-10-21 DIAGNOSIS — Z14.1 CYSTIC FIBROSIS CARRIER: ICD-10-CM

## 2022-10-21 DIAGNOSIS — O26.849 UTERINE SIZE DATE DISCREPANCY PREGNANCY: ICD-10-CM

## 2022-10-21 DIAGNOSIS — I83.812 VARICOSE VEINS OF LEFT LOWER EXTREMITY WITH PAIN: ICD-10-CM

## 2022-10-21 DIAGNOSIS — Z34.80 SUPERVISION OF OTHER NORMAL PREGNANCY, ANTEPARTUM: Primary | ICD-10-CM

## 2022-10-21 DIAGNOSIS — Z84.82 FAMILY HISTORY OF SIDS (SUDDEN INFANT DEATH SYNDROME): ICD-10-CM

## 2022-10-21 PROBLEM — I83.92 VARICOSE VEINS OF LEFT LOWER EXTREMITY: Status: ACTIVE | Noted: 2022-10-21

## 2022-10-21 PROBLEM — O42.90 AMNIOTIC FLUID LEAKING: Status: ACTIVE | Noted: 2022-10-21

## 2022-10-21 LAB
BH CV LOWER VASCULAR LEFT COMMON FEMORAL AUGMENT: NORMAL
BH CV LOWER VASCULAR LEFT COMMON FEMORAL COMPRESS: NORMAL
BH CV LOWER VASCULAR LEFT COMMON FEMORAL PHASIC: NORMAL
BH CV LOWER VASCULAR LEFT COMMON FEMORAL SPONT: NORMAL
BH CV LOWER VASCULAR LEFT DISTAL FEMORAL AUGMENT: NORMAL
BH CV LOWER VASCULAR LEFT DISTAL FEMORAL COMPETENT: NORMAL
BH CV LOWER VASCULAR LEFT DISTAL FEMORAL COMPRESS: NORMAL
BH CV LOWER VASCULAR LEFT DISTAL FEMORAL PHASIC: NORMAL
BH CV LOWER VASCULAR LEFT DISTAL FEMORAL SPONT: NORMAL
BH CV LOWER VASCULAR LEFT GASTRONEMIUS COMPRESS: NORMAL
BH CV LOWER VASCULAR LEFT GREATER SAPH AK COMPRESS: NORMAL
BH CV LOWER VASCULAR LEFT GREATER SAPH BK COMPRESS: NORMAL
BH CV LOWER VASCULAR LEFT LESSER SAPH COMPRESS: NORMAL
BH CV LOWER VASCULAR LEFT MID FEMORAL COMPRESS: NORMAL
BH CV LOWER VASCULAR LEFT PERONEAL COMPRESS: NORMAL
BH CV LOWER VASCULAR LEFT POPLITEAL AUGMENT: NORMAL
BH CV LOWER VASCULAR LEFT POPLITEAL COMPETENT: NORMAL
BH CV LOWER VASCULAR LEFT POPLITEAL COMPRESS: NORMAL
BH CV LOWER VASCULAR LEFT POPLITEAL PHASIC: NORMAL
BH CV LOWER VASCULAR LEFT POPLITEAL SPONT: NORMAL
BH CV LOWER VASCULAR LEFT POSTERIOR TIBIAL AUGMENT: NORMAL
BH CV LOWER VASCULAR LEFT POSTERIOR TIBIAL COMPRESS: NORMAL
BH CV LOWER VASCULAR LEFT PROXIMAL FEMORAL COMPRESS: NORMAL
BH CV LOWER VASCULAR LEFT VARICOSITY BK COMPRESS: NORMAL
BH CV LOWER VASCULAR RIGHT COMMON FEMORAL AUGMENT: NORMAL
BH CV LOWER VASCULAR RIGHT COMMON FEMORAL COMPRESS: NORMAL
BH CV LOWER VASCULAR RIGHT COMMON FEMORAL PHASIC: NORMAL
BH CV LOWER VASCULAR RIGHT COMMON FEMORAL SPONT: NORMAL
BH CV VAS PRELIMINARY FINDINGS SCRIPTING: 1
GLUCOSE UR STRIP-MCNC: NEGATIVE MG/DL
MAXIMAL PREDICTED HEART RATE: 192 BPM
PROT UR STRIP-MCNC: ABNORMAL MG/DL
STRESS TARGET HR: 163 BPM

## 2022-10-21 PROCEDURE — G0463 HOSPITAL OUTPT CLINIC VISIT: HCPCS

## 2022-10-21 PROCEDURE — 93971 EXTREMITY STUDY: CPT

## 2022-10-21 PROCEDURE — 93971 EXTREMITY STUDY: CPT | Performed by: SURGERY

## 2022-10-21 PROCEDURE — 99213 OFFICE O/P EST LOW 20 MIN: CPT | Performed by: STUDENT IN AN ORGANIZED HEALTH CARE EDUCATION/TRAINING PROGRAM

## 2022-10-21 PROCEDURE — 59025 FETAL NON-STRESS TEST: CPT

## 2022-10-21 RX ORDER — MISOPROSTOL 200 UG/1
800 TABLET ORAL ONCE AS NEEDED
Status: CANCELLED | OUTPATIENT
Start: 2022-10-21

## 2022-10-21 RX ORDER — LIDOCAINE HYDROCHLORIDE 10 MG/ML
5 INJECTION, SOLUTION EPIDURAL; INFILTRATION; INTRACAUDAL; PERINEURAL AS NEEDED
Status: DISCONTINUED | OUTPATIENT
Start: 2022-10-21 | End: 2022-10-21

## 2022-10-21 RX ORDER — KETOROLAC TROMETHAMINE 30 MG/ML
30 INJECTION, SOLUTION INTRAMUSCULAR; INTRAVENOUS ONCE
Status: CANCELLED | OUTPATIENT
Start: 2022-10-21 | End: 2022-10-21

## 2022-10-21 RX ORDER — SODIUM CHLORIDE, SODIUM LACTATE, POTASSIUM CHLORIDE, CALCIUM CHLORIDE 600; 310; 30; 20 MG/100ML; MG/100ML; MG/100ML; MG/100ML
125 INJECTION, SOLUTION INTRAVENOUS CONTINUOUS
Status: DISCONTINUED | OUTPATIENT
Start: 2022-10-21 | End: 2022-10-21

## 2022-10-21 RX ORDER — CEFAZOLIN SODIUM IN 0.9 % NACL 3 G/100 ML
3 INTRAVENOUS SOLUTION, PIGGYBACK (ML) INTRAVENOUS ONCE
Status: DISCONTINUED | OUTPATIENT
Start: 2022-10-21 | End: 2022-10-21

## 2022-10-21 RX ORDER — SODIUM CHLORIDE 0.9 % (FLUSH) 0.9 %
10 SYRINGE (ML) INJECTION AS NEEDED
Status: DISCONTINUED | OUTPATIENT
Start: 2022-10-21 | End: 2022-10-21

## 2022-10-21 RX ORDER — METHYLERGONOVINE MALEATE 0.2 MG/ML
200 INJECTION INTRAVENOUS ONCE AS NEEDED
Status: CANCELLED | OUTPATIENT
Start: 2022-10-21

## 2022-10-21 RX ORDER — CARBOPROST TROMETHAMINE 250 UG/ML
250 INJECTION, SOLUTION INTRAMUSCULAR
Status: CANCELLED | OUTPATIENT
Start: 2022-10-21

## 2022-10-21 RX ORDER — ACETAMINOPHEN 500 MG
1000 TABLET ORAL ONCE
Status: DISCONTINUED | OUTPATIENT
Start: 2022-10-21 | End: 2022-10-21

## 2022-10-21 RX ORDER — SODIUM CHLORIDE 0.9 % (FLUSH) 0.9 %
10 SYRINGE (ML) INJECTION EVERY 12 HOURS SCHEDULED
Status: DISCONTINUED | OUTPATIENT
Start: 2022-10-21 | End: 2022-10-21

## 2022-10-21 NOTE — NON STRESS TEST
Obstetrical Non-stress Test Interpretation     Name:  Raysa Ellis  MRN: [id number]    27 y.o. female  at 39w1d    Indication: Sent from office- contractions, varicose vein      Fetal Assessment  Fetal Movement: active  Fetal HR Assessment Method: external  Fetal HR (beats/min): 135  Fetal HR Baseline: normal range  Fetal HR Variability: moderate (amplitude range 6 to 25 bpm)  Fetal HR Accelerations: greater than/equal to 15 bpm, lasting at least 15 seconds  Fetal HR Decelerations: early  Sinusoidal Pattern Present: absent  Fetal HR Tracing Category: Category I    /71 (BP Location: Right arm, Patient Position: Lying)   Pulse (!) 123   Resp 16   LMP 2022     Reason for test: OB Triage, Other (Comment) (Sent from office- Contractions)  Date of Test: 10/21/2022  Time frame of test:   RN NST Interpretation: Reactive      Zhane Del Cid RN  10/21/2022  18:48 EDT

## 2022-10-21 NOTE — PROGRESS NOTES
OB FOLLOW UP  Complaint   Chief Complaint   Patient presents with   • Routine Prenatal Visit            Raysa Ellis is a 27 y.o.  39w1d patient being seen today for her obstetrical follow up visit. Patient denies decreased fetal movement, loss of fluid or vaginal bleeding.  Reports that she is started having regular contractions since presenting for office visit today.  Started about 20 minutes ago.  Are taking her breath away.  Reports that left leg has been showing signs of getting more varicose veins.  Is uncomfortable.    Her prenatal care is complicated by (and status) :    Patient Active Problem List   Diagnosis   • Hx of  section   • Migraine headache   • Generalized anxiety disorder   • Family history of SIDS (sudden infant death syndrome)   • Supervision of other normal pregnancy, antepartum   • Cystic fibrosis carrier   • Proteinuria affecting pregnancy in third trimester   • Uterine size date discrepancy pregnancy   • Varicose veins of left lower extremity       All other systems reviewed and are negative.     The additional following portions of the patient's history were reviewed and updated as appropriate: allergies, current medications, past family history, past medical history, past social history, past surgical history and problem list.      EXAM:     Vital signs: /72   Wt 71.2 kg (157 lb)   LMP 2022   BMI 26.95 kg/m²   Appearance/psychiatric: To be in no distress  Constitutional: The patient is well nourished.  Cardiovascular: She does not have edema.  Respiratory: Respiratory effort is normal.  Gastrointestinal: Abdomen is soft, gravid, nontender, no rashes, heart tones are present, fundal height is size equals dates    Pelvic Exam: Yes.  Presentation: cephalic. Dilation: 2cm. Effacement: 60. Station: -3.    Urine glucose/protein: See prenatal flowsheet       Assessment and Plan    Problem List Items Addressed This Visit        Advance Directives and General Issues     Family history of SIDS (sudden infant death syndrome)    Overview     2022 G3, passed in January; autopsy with no findings.             Cardiac and Vasculature    Varicose veins of left lower extremity       Chromosomal and Congenital     Cystic fibrosis carrier    Overview     2022 partner negative             Gravid and     Hx of  section    Overview     2022 G2, successful            Supervision of other normal pregnancy, antepartum - Primary    Overview     Initial visit:  • PNL: 2022   • PAP/GC/CT/Urine culture: NILM; 2022 collected   • Blood type: O+  • Hx of HSV?: no    Genetic testin2022 - CF carrier  Low risk XX  2022 Declines AFP    Vaccinations:  • COVID:  2022 recommended    24-28 weeks: 2022   • 1hr GCT:  • Hct/Plt:  • Tdap Rx    Third Trimester:  • GBS  • Breast pump:    Ultrasound:  • Dating: 3/17/2022 NIKOLAY 10/27/2022 by LMP and 8week US  • Anatomy: 2022 CWD, BPD/HC 9-10%ile  • Follow up:  2022 EFW within normal limits BPD is in the 9th percentile head circumference is in the 5th percentile.  Referral to maternal-fetal medicine placed for concerns for microcephaly.     PLAN:  Hx CS w subsequent successful - plans - consents to be signed  Anxiety- no meds (stopped Zoloft).  2022 doing well  G3 SIDS  CF carrier- FOB 2022 tested  Small HC/BPD on anatomy US- recheck 4weeks             Relevant Orders    POC Urinalysis Dipstick (Completed)    Proteinuria affecting pregnancy in third trimester    Overview     10/6/2022 no protein in urine 37-week appointment  10/14/2022  Asx; 1+ @ 38 weeks BP normotensive          Uterine size date discrepancy pregnancy    Overview     10/6/2022 follow-up ultrasound 46 percentile for estimated fetal weight 6 pounds 9 ounces.  AC 73rd percentile JULIA normal.  Vertex presentation anterior placenta  10/14/2022 Measuring 36 on FH            Impression  1. Pregnancy at  39w1d  2. Fetal status reassuring.   3. Activity and Exercise discussed.    Plan  1.  Patient to labor and delivery for rule out labor.  Also concerns for left leg and potential for DVT.  Doppler ultrasound to be ordered.  2.  Patient currently 39 weeks and 1 day if concerns for fetal wellbeing during monitoring day or patient labor intercourse recommendation for delivery.  3.  Follow-up 1 week      Patient was counseled to the following pregnancy precautions:  • Decreased fetal movement, if concern for decreased fetal movement please perform fetal kick counts you are looking for 10 movements in 2 hours.  If concern for fetal movement and not meeting that criteria, please present to triage for evaluation.  • Contractions occurring every 5 minutes for over an hour, lasting 30 to 60 seconds and progressively causing more discomfort, please seek medical attention to rule out labor  • If you believe that your water is broken, place a sanitary pad.  If pad fills in short period of time i.e. less than 5 minutes, take off pad placed another pad.  If this is saturated please present for rule out rupture of membranes  • Vaginal bleeding can be normal in pregnancy, this usually takes a form of spotting.  If having heavier bleeding like a menstrual period please present for evaluation; especially in light of severe abdominal pain this could represent a placental abruption.  • Keep all scheduled appointments as recommended.        Eliot Gupta MD  10/21/2022

## 2022-10-30 ENCOUNTER — PREP FOR SURGERY (OUTPATIENT)
Dept: OTHER | Facility: HOSPITAL | Age: 27
End: 2022-10-30

## 2022-10-30 DIAGNOSIS — Z34.80 SUPERVISION OF OTHER NORMAL PREGNANCY, ANTEPARTUM: Primary | ICD-10-CM

## 2022-10-30 RX ORDER — OXYTOCIN/0.9 % SODIUM CHLORIDE 30/500 ML
999 PLASTIC BAG, INJECTION (ML) INTRAVENOUS ONCE
Status: CANCELLED | OUTPATIENT
Start: 2022-10-30 | End: 2022-10-30

## 2022-10-30 RX ORDER — OXYTOCIN/0.9 % SODIUM CHLORIDE 30/500 ML
250 PLASTIC BAG, INJECTION (ML) INTRAVENOUS CONTINUOUS
Status: CANCELLED | OUTPATIENT
Start: 2022-10-30 | End: 2022-10-30

## 2022-10-30 RX ORDER — HYDROXYZINE HYDROCHLORIDE 25 MG/1
50 TABLET, FILM COATED ORAL NIGHTLY PRN
Status: CANCELLED | OUTPATIENT
Start: 2022-10-30

## 2022-10-30 RX ORDER — ACETAMINOPHEN 325 MG/1
650 TABLET ORAL EVERY 4 HOURS PRN
Status: CANCELLED | OUTPATIENT
Start: 2022-10-30

## 2022-10-30 RX ORDER — SODIUM CHLORIDE 0.9 % (FLUSH) 0.9 %
3 SYRINGE (ML) INJECTION EVERY 12 HOURS SCHEDULED
Status: CANCELLED | OUTPATIENT
Start: 2022-10-30

## 2022-10-30 RX ORDER — HYDROCODONE BITARTRATE AND ACETAMINOPHEN 5; 325 MG/1; MG/1
1 TABLET ORAL EVERY 4 HOURS PRN
Status: CANCELLED | OUTPATIENT
Start: 2022-10-30 | End: 2022-11-06

## 2022-10-30 RX ORDER — ONDANSETRON 2 MG/ML
4 INJECTION INTRAMUSCULAR; INTRAVENOUS EVERY 6 HOURS PRN
Status: CANCELLED | OUTPATIENT
Start: 2022-10-30

## 2022-10-30 RX ORDER — ACETAMINOPHEN 325 MG/1
625 TABLET ORAL EVERY 4 HOURS PRN
Status: CANCELLED | OUTPATIENT
Start: 2022-10-30

## 2022-10-30 RX ORDER — TRISODIUM CITRATE DIHYDRATE AND CITRIC ACID MONOHYDRATE 500; 334 MG/5ML; MG/5ML
30 SOLUTION ORAL ONCE AS NEEDED
Status: CANCELLED | OUTPATIENT
Start: 2022-10-30

## 2022-10-30 RX ORDER — MISOPROSTOL 200 UG/1
800 TABLET ORAL AS NEEDED
Status: CANCELLED | OUTPATIENT
Start: 2022-10-30

## 2022-10-30 RX ORDER — METHYLERGONOVINE MALEATE 0.2 MG/ML
200 INJECTION INTRAVENOUS ONCE AS NEEDED
Status: CANCELLED | OUTPATIENT
Start: 2022-10-30

## 2022-10-30 RX ORDER — CARBOPROST TROMETHAMINE 250 UG/ML
250 INJECTION, SOLUTION INTRAMUSCULAR AS NEEDED
Status: CANCELLED | OUTPATIENT
Start: 2022-10-30

## 2022-10-30 RX ORDER — ONDANSETRON 4 MG/1
4 TABLET, FILM COATED ORAL EVERY 6 HOURS PRN
Status: CANCELLED | OUTPATIENT
Start: 2022-10-30

## 2022-10-30 RX ORDER — TERBUTALINE SULFATE 1 MG/ML
0.25 INJECTION, SOLUTION SUBCUTANEOUS AS NEEDED
Status: CANCELLED | OUTPATIENT
Start: 2022-10-30

## 2022-10-30 RX ORDER — IBUPROFEN 600 MG/1
600 TABLET ORAL EVERY 6 HOURS PRN
Status: CANCELLED | OUTPATIENT
Start: 2022-10-30

## 2022-10-30 RX ORDER — MORPHINE SULFATE 2 MG/ML
2 INJECTION, SOLUTION INTRAMUSCULAR; INTRAVENOUS
Status: CANCELLED | OUTPATIENT
Start: 2022-10-30 | End: 2022-11-06

## 2022-10-30 RX ORDER — LIDOCAINE HYDROCHLORIDE 10 MG/ML
5 INJECTION, SOLUTION EPIDURAL; INFILTRATION; INTRACAUDAL; PERINEURAL AS NEEDED
Status: CANCELLED | OUTPATIENT
Start: 2022-10-30

## 2022-10-30 RX ORDER — SODIUM CHLORIDE 0.9 % (FLUSH) 0.9 %
10 SYRINGE (ML) INJECTION AS NEEDED
Status: CANCELLED | OUTPATIENT
Start: 2022-10-30

## 2022-10-30 RX ORDER — OXYTOCIN/0.9 % SODIUM CHLORIDE 30/500 ML
2-20 PLASTIC BAG, INJECTION (ML) INTRAVENOUS
Status: CANCELLED | OUTPATIENT
Start: 2022-10-30

## 2022-10-30 RX ORDER — SODIUM CHLORIDE, SODIUM LACTATE, POTASSIUM CHLORIDE, CALCIUM CHLORIDE 600; 310; 30; 20 MG/100ML; MG/100ML; MG/100ML; MG/100ML
125 INJECTION, SOLUTION INTRAVENOUS CONTINUOUS
Status: CANCELLED | OUTPATIENT
Start: 2022-10-30

## 2022-10-31 ENCOUNTER — ANESTHESIA (OUTPATIENT)
Dept: LABOR AND DELIVERY | Facility: HOSPITAL | Age: 27
End: 2022-10-31

## 2022-10-31 ENCOUNTER — HOSPITAL ENCOUNTER (OUTPATIENT)
Dept: LABOR AND DELIVERY | Facility: HOSPITAL | Age: 27
Discharge: HOME | End: 2022-10-31

## 2022-10-31 ENCOUNTER — HOSPITAL ENCOUNTER (INPATIENT)
Facility: HOSPITAL | Age: 27
LOS: 1 days | Discharge: HOME | End: 2022-11-01
Attending: OBSTETRICS & GYNECOLOGY | Admitting: STUDENT IN AN ORGANIZED HEALTH CARE EDUCATION/TRAINING PROGRAM

## 2022-10-31 ENCOUNTER — ANESTHESIA EVENT (OUTPATIENT)
Dept: LABOR AND DELIVERY | Facility: HOSPITAL | Age: 27
End: 2022-10-31

## 2022-10-31 DIAGNOSIS — Z34.80 SUPERVISION OF OTHER NORMAL PREGNANCY, ANTEPARTUM: ICD-10-CM

## 2022-10-31 PROBLEM — Z34.90 ENCOUNTER FOR INDUCTION OF LABOR: Status: ACTIVE | Noted: 2022-10-31

## 2022-10-31 PROBLEM — F19.90 ILLICIT DRUG USE: Status: ACTIVE | Noted: 2022-10-31

## 2022-10-31 PROBLEM — O26.849 UTERINE SIZE DATE DISCREPANCY PREGNANCY: Status: RESOLVED | Noted: 2022-09-30 | Resolved: 2022-10-31

## 2022-10-31 PROBLEM — O12.13 PROTEINURIA AFFECTING PREGNANCY IN THIRD TRIMESTER: Status: RESOLVED | Noted: 2022-09-30 | Resolved: 2022-10-31

## 2022-10-31 LAB
ABO GROUP BLD: NORMAL
AMPHET+METHAMPHET UR QL: NEGATIVE
BARBITURATES UR QL SCN: NEGATIVE
BASE EXCESS BLDCOA CALC-SCNC: -5.8 MMOL/L (ref -2–2)
BASE EXCESS BLDCOV CALC-SCNC: -2.2 MMOL/L (ref -2–2)
BASOPHILS # BLD AUTO: 0.05 10*3/MM3 (ref 0–0.2)
BASOPHILS NFR BLD AUTO: 0.5 % (ref 0–1.5)
BENZODIAZ UR QL SCN: NEGATIVE
BLD GP AB SCN SERPL QL: NEGATIVE
CANNABINOIDS SERPL QL: POSITIVE
COCAINE UR QL: NEGATIVE
DEPRECATED RDW RBC AUTO: 39.8 FL (ref 37–54)
EOSINOPHIL # BLD AUTO: 0.14 10*3/MM3 (ref 0–0.4)
EOSINOPHIL NFR BLD AUTO: 1.3 % (ref 0.3–6.2)
ERYTHROCYTE [DISTWIDTH] IN BLOOD BY AUTOMATED COUNT: 13.8 % (ref 12.3–15.4)
HCO3 BLDCOA-SCNC: 21.9 MMOL/L
HCO3 BLDCOV-SCNC: 23.8 MMOL/L
HCT VFR BLD AUTO: 35.2 % (ref 34–46.6)
HGB BLD-MCNC: 11.7 G/DL (ref 12–15.9)
IMM GRANULOCYTES # BLD AUTO: 0.09 10*3/MM3 (ref 0–0.05)
IMM GRANULOCYTES NFR BLD AUTO: 0.8 % (ref 0–0.5)
LYMPHOCYTES # BLD AUTO: 2.2 10*3/MM3 (ref 0.7–3.1)
LYMPHOCYTES NFR BLD AUTO: 19.9 % (ref 19.6–45.3)
MCH RBC QN AUTO: 26.5 PG (ref 26.6–33)
MCHC RBC AUTO-ENTMCNC: 33.2 G/DL (ref 31.5–35.7)
MCV RBC AUTO: 79.6 FL (ref 79–97)
METHADONE UR QL SCN: NEGATIVE
MONOCYTES # BLD AUTO: 0.63 10*3/MM3 (ref 0.1–0.9)
MONOCYTES NFR BLD AUTO: 5.7 % (ref 5–12)
NEUTROPHILS NFR BLD AUTO: 7.92 10*3/MM3 (ref 1.7–7)
NEUTROPHILS NFR BLD AUTO: 71.8 % (ref 42.7–76)
NRBC BLD AUTO-RTO: 0 /100 WBC (ref 0–0.2)
OPIATES UR QL: NEGATIVE
OXYCODONE UR QL SCN: NEGATIVE
PCO2 BLDCOA: 51.2 MMHG (ref 33–49)
PCO2 BLDCOV: 45 MM HG (ref 28–40)
PH BLDCOA: 7.25 PH UNITS (ref 7.21–7.31)
PH BLDCOV: 7.34 PH UNITS (ref 7.31–7.37)
PLATELET # BLD AUTO: 217 10*3/MM3 (ref 140–450)
PMV BLD AUTO: 10.1 FL (ref 6–12)
PO2 BLDCOA: <40.5 MMHG
PO2 BLDCOV: <40.5 MM HG (ref 21–31)
RBC # BLD AUTO: 4.42 10*6/MM3 (ref 3.77–5.28)
RH BLD: POSITIVE
T&S EXPIRATION DATE: NORMAL
WBC NRBC COR # BLD: 11.03 10*3/MM3 (ref 3.4–10.8)

## 2022-10-31 PROCEDURE — 80307 DRUG TEST PRSMV CHEM ANLYZR: CPT | Performed by: STUDENT IN AN ORGANIZED HEALTH CARE EDUCATION/TRAINING PROGRAM

## 2022-10-31 PROCEDURE — 82803 BLOOD GASES ANY COMBINATION: CPT | Performed by: STUDENT IN AN ORGANIZED HEALTH CARE EDUCATION/TRAINING PROGRAM

## 2022-10-31 PROCEDURE — S0260 H&P FOR SURGERY: HCPCS | Performed by: STUDENT IN AN ORGANIZED HEALTH CARE EDUCATION/TRAINING PROGRAM

## 2022-10-31 PROCEDURE — 10H07YZ INSERTION OF OTHER DEVICE INTO PRODUCTS OF CONCEPTION, VIA NATURAL OR ARTIFICIAL OPENING: ICD-10-PCS | Performed by: STUDENT IN AN ORGANIZED HEALTH CARE EDUCATION/TRAINING PROGRAM

## 2022-10-31 PROCEDURE — 86900 BLOOD TYPING SEROLOGIC ABO: CPT | Performed by: STUDENT IN AN ORGANIZED HEALTH CARE EDUCATION/TRAINING PROGRAM

## 2022-10-31 PROCEDURE — 10907ZC DRAINAGE OF AMNIOTIC FLUID, THERAPEUTIC FROM PRODUCTS OF CONCEPTION, VIA NATURAL OR ARTIFICIAL OPENING: ICD-10-PCS | Performed by: STUDENT IN AN ORGANIZED HEALTH CARE EDUCATION/TRAINING PROGRAM

## 2022-10-31 PROCEDURE — 88307 TISSUE EXAM BY PATHOLOGIST: CPT | Performed by: STUDENT IN AN ORGANIZED HEALTH CARE EDUCATION/TRAINING PROGRAM

## 2022-10-31 PROCEDURE — C1755 CATHETER, INTRASPINAL: HCPCS | Performed by: ANESTHESIOLOGY

## 2022-10-31 PROCEDURE — 86850 RBC ANTIBODY SCREEN: CPT | Performed by: STUDENT IN AN ORGANIZED HEALTH CARE EDUCATION/TRAINING PROGRAM

## 2022-10-31 PROCEDURE — 86901 BLOOD TYPING SEROLOGIC RH(D): CPT | Performed by: STUDENT IN AN ORGANIZED HEALTH CARE EDUCATION/TRAINING PROGRAM

## 2022-10-31 PROCEDURE — 25010000002 FENTANYL CITRATE (PF) 50 MCG/ML SOLUTION: Performed by: ANESTHESIOLOGY

## 2022-10-31 PROCEDURE — 85025 COMPLETE CBC W/AUTO DIFF WBC: CPT | Performed by: STUDENT IN AN ORGANIZED HEALTH CARE EDUCATION/TRAINING PROGRAM

## 2022-10-31 RX ORDER — SODIUM CHLORIDE 0.9 % (FLUSH) 0.9 %
10 SYRINGE (ML) INJECTION AS NEEDED
Status: DISCONTINUED | OUTPATIENT
Start: 2022-10-31 | End: 2022-10-31 | Stop reason: HOSPADM

## 2022-10-31 RX ORDER — CALCIUM CARBONATE 200(500)MG
2 TABLET,CHEWABLE ORAL 3 TIMES DAILY PRN
Status: DISCONTINUED | OUTPATIENT
Start: 2022-10-31 | End: 2022-11-01 | Stop reason: HOSPADM

## 2022-10-31 RX ORDER — TRISODIUM CITRATE DIHYDRATE AND CITRIC ACID MONOHYDRATE 500; 334 MG/5ML; MG/5ML
30 SOLUTION ORAL ONCE AS NEEDED
Status: DISCONTINUED | OUTPATIENT
Start: 2022-10-31 | End: 2022-10-31 | Stop reason: HOSPADM

## 2022-10-31 RX ORDER — EPHEDRINE SULFATE 50 MG/ML
5 INJECTION, SOLUTION INTRAVENOUS
Status: DISCONTINUED | OUTPATIENT
Start: 2022-10-31 | End: 2022-10-31 | Stop reason: HOSPADM

## 2022-10-31 RX ORDER — IBUPROFEN 600 MG/1
600 TABLET ORAL EVERY 6 HOURS PRN
Status: DISCONTINUED | OUTPATIENT
Start: 2022-10-31 | End: 2022-10-31 | Stop reason: HOSPADM

## 2022-10-31 RX ORDER — TERBUTALINE SULFATE 1 MG/ML
0.25 INJECTION, SOLUTION SUBCUTANEOUS AS NEEDED
Status: DISCONTINUED | OUTPATIENT
Start: 2022-10-31 | End: 2022-10-31 | Stop reason: HOSPADM

## 2022-10-31 RX ORDER — MISOPROSTOL 200 UG/1
800 TABLET ORAL ONCE AS NEEDED
Status: DISCONTINUED | OUTPATIENT
Start: 2022-10-31 | End: 2022-11-01 | Stop reason: HOSPADM

## 2022-10-31 RX ORDER — SODIUM CHLORIDE, SODIUM LACTATE, POTASSIUM CHLORIDE, CALCIUM CHLORIDE 600; 310; 30; 20 MG/100ML; MG/100ML; MG/100ML; MG/100ML
125 INJECTION, SOLUTION INTRAVENOUS CONTINUOUS
Status: DISCONTINUED | OUTPATIENT
Start: 2022-10-31 | End: 2022-11-01 | Stop reason: HOSPADM

## 2022-10-31 RX ORDER — METHYLERGONOVINE MALEATE 0.2 MG/ML
200 INJECTION INTRAVENOUS ONCE AS NEEDED
Status: DISCONTINUED | OUTPATIENT
Start: 2022-10-31 | End: 2022-10-31 | Stop reason: HOSPADM

## 2022-10-31 RX ORDER — CARBOPROST TROMETHAMINE 250 UG/ML
250 INJECTION, SOLUTION INTRAMUSCULAR
Status: DISCONTINUED | OUTPATIENT
Start: 2022-10-31 | End: 2022-10-31 | Stop reason: HOSPADM

## 2022-10-31 RX ORDER — IBUPROFEN 800 MG/1
800 TABLET ORAL EVERY 8 HOURS SCHEDULED
Status: DISCONTINUED | OUTPATIENT
Start: 2022-10-31 | End: 2022-11-01 | Stop reason: HOSPADM

## 2022-10-31 RX ORDER — MISOPROSTOL 200 UG/1
800 TABLET ORAL ONCE AS NEEDED
Status: DISCONTINUED | OUTPATIENT
Start: 2022-10-31 | End: 2022-10-31 | Stop reason: HOSPADM

## 2022-10-31 RX ORDER — ONDANSETRON 4 MG/1
4 TABLET, FILM COATED ORAL EVERY 8 HOURS PRN
Status: DISCONTINUED | OUTPATIENT
Start: 2022-10-31 | End: 2022-11-01 | Stop reason: HOSPADM

## 2022-10-31 RX ORDER — LIDOCAINE HYDROCHLORIDE 10 MG/ML
5 INJECTION, SOLUTION EPIDURAL; INFILTRATION; INTRACAUDAL; PERINEURAL AS NEEDED
Status: DISCONTINUED | OUTPATIENT
Start: 2022-10-31 | End: 2022-10-31 | Stop reason: HOSPADM

## 2022-10-31 RX ORDER — CARBOPROST TROMETHAMINE 250 UG/ML
250 INJECTION, SOLUTION INTRAMUSCULAR
Status: DISCONTINUED | OUTPATIENT
Start: 2022-10-31 | End: 2022-11-01 | Stop reason: HOSPADM

## 2022-10-31 RX ORDER — HYDROXYZINE HYDROCHLORIDE 25 MG/1
50 TABLET, FILM COATED ORAL NIGHTLY PRN
Status: DISCONTINUED | OUTPATIENT
Start: 2022-10-31 | End: 2022-10-31 | Stop reason: HOSPADM

## 2022-10-31 RX ORDER — SODIUM CHLORIDE 0.9 % (FLUSH) 0.9 %
3 SYRINGE (ML) INJECTION EVERY 12 HOURS SCHEDULED
Status: DISCONTINUED | OUTPATIENT
Start: 2022-10-31 | End: 2022-10-31 | Stop reason: HOSPADM

## 2022-10-31 RX ORDER — MORPHINE SULFATE 2 MG/ML
2 INJECTION, SOLUTION INTRAMUSCULAR; INTRAVENOUS
Status: DISCONTINUED | OUTPATIENT
Start: 2022-10-31 | End: 2022-10-31 | Stop reason: HOSPADM

## 2022-10-31 RX ORDER — SODIUM CHLORIDE 0.9 % (FLUSH) 0.9 %
1-10 SYRINGE (ML) INJECTION AS NEEDED
Status: DISCONTINUED | OUTPATIENT
Start: 2022-10-31 | End: 2022-11-01 | Stop reason: HOSPADM

## 2022-10-31 RX ORDER — OXYTOCIN/0.9 % SODIUM CHLORIDE 30/500 ML
999 PLASTIC BAG, INJECTION (ML) INTRAVENOUS ONCE
Status: DISCONTINUED | OUTPATIENT
Start: 2022-10-31 | End: 2022-10-31 | Stop reason: HOSPADM

## 2022-10-31 RX ORDER — FENTANYL CITRATE 50 UG/ML
INJECTION, SOLUTION INTRAMUSCULAR; INTRAVENOUS
Status: COMPLETED | OUTPATIENT
Start: 2022-10-31 | End: 2022-10-31

## 2022-10-31 RX ORDER — ACETAMINOPHEN 500 MG
1000 TABLET ORAL EVERY 6 HOURS
Status: DISCONTINUED | OUTPATIENT
Start: 2022-10-31 | End: 2022-11-01 | Stop reason: HOSPADM

## 2022-10-31 RX ORDER — CARBOPROST TROMETHAMINE 250 UG/ML
250 INJECTION, SOLUTION INTRAMUSCULAR AS NEEDED
Status: DISCONTINUED | OUTPATIENT
Start: 2022-10-31 | End: 2022-10-31 | Stop reason: HOSPADM

## 2022-10-31 RX ORDER — METHYLERGONOVINE MALEATE 0.2 MG/ML
200 INJECTION INTRAVENOUS ONCE AS NEEDED
Status: DISCONTINUED | OUTPATIENT
Start: 2022-10-31 | End: 2022-11-01 | Stop reason: HOSPADM

## 2022-10-31 RX ORDER — ONDANSETRON 4 MG/1
4 TABLET, FILM COATED ORAL EVERY 6 HOURS PRN
Status: DISCONTINUED | OUTPATIENT
Start: 2022-10-31 | End: 2022-10-31 | Stop reason: HOSPADM

## 2022-10-31 RX ORDER — PRENATAL WITH FERROUS FUM AND FOLIC ACID 3080; 920; 120; 400; 22; 1.84; 3; 20; 10; 1; 12; 200; 27; 25; 2 [IU]/1; [IU]/1; MG/1; [IU]/1; MG/1; MG/1; MG/1; MG/1; MG/1; MG/1; UG/1; MG/1; MG/1; MG/1; MG/1
1 TABLET ORAL DAILY
Status: DISCONTINUED | OUTPATIENT
Start: 2022-11-01 | End: 2022-11-01 | Stop reason: HOSPADM

## 2022-10-31 RX ORDER — HYDROCODONE BITARTRATE AND ACETAMINOPHEN 5; 325 MG/1; MG/1
1 TABLET ORAL EVERY 4 HOURS PRN
Status: DISCONTINUED | OUTPATIENT
Start: 2022-10-31 | End: 2022-10-31 | Stop reason: HOSPADM

## 2022-10-31 RX ORDER — ONDANSETRON 2 MG/ML
4 INJECTION INTRAMUSCULAR; INTRAVENOUS EVERY 6 HOURS PRN
Status: DISCONTINUED | OUTPATIENT
Start: 2022-10-31 | End: 2022-10-31 | Stop reason: HOSPADM

## 2022-10-31 RX ORDER — ONDANSETRON 2 MG/ML
4 INJECTION INTRAMUSCULAR; INTRAVENOUS EVERY 6 HOURS PRN
Status: DISCONTINUED | OUTPATIENT
Start: 2022-10-31 | End: 2022-11-01 | Stop reason: HOSPADM

## 2022-10-31 RX ORDER — FENTANYL CITRATE 50 UG/ML
INJECTION, SOLUTION INTRAMUSCULAR; INTRAVENOUS
Status: COMPLETED
Start: 2022-10-31 | End: 2022-10-31

## 2022-10-31 RX ORDER — MISOPROSTOL 200 UG/1
800 TABLET ORAL AS NEEDED
Status: DISCONTINUED | OUTPATIENT
Start: 2022-10-31 | End: 2022-10-31 | Stop reason: HOSPADM

## 2022-10-31 RX ORDER — OXYTOCIN/0.9 % SODIUM CHLORIDE 30/500 ML
2-20 PLASTIC BAG, INJECTION (ML) INTRAVENOUS
Status: DISCONTINUED | OUTPATIENT
Start: 2022-10-31 | End: 2022-10-31 | Stop reason: HOSPADM

## 2022-10-31 RX ORDER — ACETAMINOPHEN 325 MG/1
625 TABLET ORAL EVERY 4 HOURS PRN
Status: DISCONTINUED | OUTPATIENT
Start: 2022-10-31 | End: 2022-10-31 | Stop reason: HOSPADM

## 2022-10-31 RX ORDER — ACETAMINOPHEN 325 MG/1
650 TABLET ORAL EVERY 4 HOURS PRN
Status: DISCONTINUED | OUTPATIENT
Start: 2022-10-31 | End: 2022-10-31 | Stop reason: HOSPADM

## 2022-10-31 RX ORDER — OXYCODONE HYDROCHLORIDE 5 MG/1
5 TABLET ORAL EVERY 4 HOURS PRN
Status: DISCONTINUED | OUTPATIENT
Start: 2022-10-31 | End: 2022-11-01 | Stop reason: HOSPADM

## 2022-10-31 RX ORDER — OXYTOCIN/0.9 % SODIUM CHLORIDE 30/500 ML
250 PLASTIC BAG, INJECTION (ML) INTRAVENOUS CONTINUOUS
Status: ACTIVE | OUTPATIENT
Start: 2022-10-31 | End: 2022-10-31

## 2022-10-31 RX ORDER — DOCUSATE SODIUM 100 MG/1
100 CAPSULE, LIQUID FILLED ORAL DAILY
Status: DISCONTINUED | OUTPATIENT
Start: 2022-11-01 | End: 2022-11-01 | Stop reason: HOSPADM

## 2022-10-31 RX ORDER — BISACODYL 10 MG
10 SUPPOSITORY, RECTAL RECTAL DAILY PRN
Status: DISCONTINUED | OUTPATIENT
Start: 2022-11-01 | End: 2022-11-01 | Stop reason: HOSPADM

## 2022-10-31 RX ORDER — LIDOCAINE HYDROCHLORIDE AND EPINEPHRINE 15; 5 MG/ML; UG/ML
INJECTION, SOLUTION EPIDURAL
Status: COMPLETED | OUTPATIENT
Start: 2022-10-31 | End: 2022-10-31

## 2022-10-31 RX ORDER — PROMETHAZINE HYDROCHLORIDE 12.5 MG/1
12.5 TABLET ORAL EVERY 4 HOURS PRN
Status: DISCONTINUED | OUTPATIENT
Start: 2022-10-31 | End: 2022-11-01 | Stop reason: HOSPADM

## 2022-10-31 RX ORDER — KETOROLAC TROMETHAMINE 30 MG/ML
30 INJECTION, SOLUTION INTRAMUSCULAR; INTRAVENOUS ONCE
Status: DISCONTINUED | OUTPATIENT
Start: 2022-10-31 | End: 2022-10-31 | Stop reason: HOSPADM

## 2022-10-31 RX ADMIN — IBUPROFEN 800 MG: 800 TABLET, FILM COATED ORAL at 21:43

## 2022-10-31 RX ADMIN — MISOPROSTOL 800 MCG: 200 TABLET ORAL at 17:29

## 2022-10-31 RX ADMIN — Medication 2 MILLI-UNITS/MIN: at 09:11

## 2022-10-31 RX ADMIN — Medication 10 ML/HR: at 10:38

## 2022-10-31 RX ADMIN — SODIUM CHLORIDE, POTASSIUM CHLORIDE, SODIUM LACTATE AND CALCIUM CHLORIDE 1000 ML: 600; 310; 30; 20 INJECTION, SOLUTION INTRAVENOUS at 10:46

## 2022-10-31 RX ADMIN — LIDOCAINE HYDROCHLORIDE AND EPINEPHRINE 2 ML: 15; 5 INJECTION, SOLUTION EPIDURAL at 10:38

## 2022-10-31 RX ADMIN — SODIUM CHLORIDE, POTASSIUM CHLORIDE, SODIUM LACTATE AND CALCIUM CHLORIDE 125 ML/HR: 600; 310; 30; 20 INJECTION, SOLUTION INTRAVENOUS at 09:10

## 2022-10-31 RX ADMIN — FENTANYL CITRATE 100 MCG: 50 INJECTION, SOLUTION INTRAMUSCULAR; INTRAVENOUS at 10:38

## 2022-10-31 RX ADMIN — ACETAMINOPHEN 1000 MG: 500 TABLET ORAL at 23:03

## 2022-10-31 RX ADMIN — LIDOCAINE HYDROCHLORIDE AND EPINEPHRINE 3 ML: 15; 5 INJECTION, SOLUTION EPIDURAL at 10:33

## 2022-11-01 VITALS
WEIGHT: 157 LBS | SYSTOLIC BLOOD PRESSURE: 111 MMHG | RESPIRATION RATE: 18 BRPM | DIASTOLIC BLOOD PRESSURE: 61 MMHG | HEIGHT: 64 IN | HEART RATE: 54 BPM | BODY MASS INDEX: 26.8 KG/M2 | OXYGEN SATURATION: 99 % | TEMPERATURE: 97.6 F

## 2022-11-01 PROCEDURE — 0503F POSTPARTUM CARE VISIT: CPT | Performed by: STUDENT IN AN ORGANIZED HEALTH CARE EDUCATION/TRAINING PROGRAM

## 2022-11-01 RX ORDER — ACETAMINOPHEN 500 MG
1000 TABLET ORAL EVERY 6 HOURS
Qty: 56 TABLET | Refills: 0 | Status: SHIPPED | OUTPATIENT
Start: 2022-11-01 | End: 2022-11-08

## 2022-11-01 RX ORDER — IBUPROFEN 800 MG/1
800 TABLET ORAL EVERY 8 HOURS SCHEDULED
Qty: 21 TABLET | Refills: 0 | Status: SHIPPED | OUTPATIENT
Start: 2022-11-01 | End: 2022-11-08

## 2022-11-01 RX ADMIN — DOCUSATE SODIUM 100 MG: 100 CAPSULE, LIQUID FILLED ORAL at 11:40

## 2022-11-01 RX ADMIN — PRENATAL WITH FERROUS FUM AND FOLIC ACID 1 TABLET: 3080; 920; 120; 400; 22; 1.84; 3; 20; 10; 1; 12; 200; 27; 25; 2 TABLET ORAL at 11:39

## 2022-11-01 RX ADMIN — ACETAMINOPHEN 1000 MG: 500 TABLET ORAL at 11:39

## 2022-11-01 RX ADMIN — ACETAMINOPHEN 1000 MG: 500 TABLET ORAL at 04:54

## 2022-11-01 RX ADMIN — IBUPROFEN 800 MG: 800 TABLET, FILM COATED ORAL at 05:49

## 2022-11-01 RX ADMIN — IBUPROFEN 800 MG: 800 TABLET, FILM COATED ORAL at 13:42

## 2022-11-01 NOTE — LACTATION NOTE
LC in to see this p4 patient. She has been breastfeeding with formula supplementation. She and her spouse  Had multiple questions about SIDS prevention. They had good questions. LC provided more literature with explanation and demonstration on better sleep methods. Patient is planning on discharge today. LC discussed normal infant output patterns to expect and unlimited time/access to the breast but if infant is not waking by 3 hours to wake and feed using measures shown in the hospital. LC discussed checking to make sure new medications are safe to breastfeed. LC discussed alcohol use and cigarette/second hand smoke around baby and breastfeeding and discussed the impact of street drugs on infants and breastfeeding. LC used the page in the breastfeeding guide to discuss harmful effects of these. Breastfeeding/Lactation expectations and anticipatory guidance discussed for the next two weeks . LC discussed nipple care, plugged ducts, engorgement, and breast infection. LC encouraged mom to see pediatrician two days from discharge for follow up. Patient has a breastpump for home use and LC discussed good pumping guidelines and normal expectations with pumping and storage and preparation of ebm for feedings. LC discussed breastfeeding/lactation resources after discharge and when to call the doctor. Patient showed good understanding.

## 2022-11-01 NOTE — PLAN OF CARE
Problem: Adult Inpatient Plan of Care  Goal: Plan of Care Review  Outcome: Met  Goal: Patient-Specific Goal (Individualized)  Outcome: Met  Goal: Absence of Hospital-Acquired Illness or Injury  Outcome: Met  Intervention: Identify and Manage Fall Risk  Intervention: Prevent and Manage VTE (Venous Thromboembolism) Risk  Intervention: Prevent Infection  Goal: Optimal Comfort and Wellbeing  Outcome: Met  Intervention: Provide Person-Centered Care  Goal: Readiness for Transition of Care  Outcome: Met  Intervention: Mutually Develop Transition Plan     Problem: Breastfeeding  Goal: Effective Breastfeeding  Outcome: Met  Intervention: Support Exclusive Breastfeeding Success  Intervention: Promote Effective Breastfeeding     Problem: Adjustment to Role Transition (Postpartum Vaginal Delivery)  Goal: Successful Maternal Role Transition  Outcome: Met  Intervention: Support Maternal Role Transition     Problem: Bleeding (Postpartum Vaginal Delivery)  Goal: Hemostasis  Outcome: Met     Problem: Infection (Postpartum Vaginal Delivery)  Goal: Absence of Infection Signs/Symptoms  Outcome: Met     Problem: Pain (Postpartum Vaginal Delivery)  Goal: Acceptable Pain Control  Outcome: Met     Problem: Urinary Retention (Postpartum Vaginal Delivery)  Goal: Effective Urinary Elimination  Outcome: Met  Intervention: Promote Effective Urinary Elimination   Goal Outcome Evaluation:         Vss and pain controlled. Lochia wnl. Appropriate bonding. Desirous of discharge

## 2022-11-01 NOTE — CONSULTS
Discharge planning  completed assessment with patient at bedside. FOB is also present. Patient has chosen pediatrician with [facility] ([facility] Patient denies need for counseling resources. She denies hx of postpartum depression. Patient is current with [facility]. At this time, she denies need for the HANDS program. Parents appear to be appropriate with infant.     Patient has been advised that her urine drug screen was positive for THC on admission. Patient has been advised that should the baby test positive for THC, a CPS report was necessary and mandated from the hospital. Patient verbalized understanding. At this time, there are no concerns with MOB or FOB. They identify that they have all needed items for baby post discharge. Transportation confirmed and reliable. No financial concerns identified.

## 2022-11-01 NOTE — PROGRESS NOTES
"PostPartum/PostOp PROGRESS NOTE        Subjective:  Patient has no complaints  Pain controlled  Tolerating a regular diet  Passing flatus  Ambulating  Urinating spontaneously  Lochia decreasing, no bleeding concerns  Denies HA, vision change, or RUQ/epigastric pain  No lightheadedness or dizziness  Desires DC home if baby Dc'd   Endorses use of CBD droplets     Objective:  Vitals: Blood pressure 114/91, pulse 86, temperature 98.3 °F (36.8 °C), temperature source Oral, resp. rate 16, height 162.6 cm (64\"), weight 71.2 kg (157 lb), last menstrual period 01/20/2022, SpO2 99 %, currently breastfeeding.          General: NAD, alert and oriented, appropriate  Psych: Normal mood, appropriate  Abdomen: Fundus firm, non tender  Extremeties: Trace edema, no signs of DVT, varicose veins on left lower extremitiy     Labs:  Lab Results (last 24 hours)     Procedure Component Value Units Date/Time    CBC & Differential [[id number]]  (Abnormal) Collected: 10/31/22 0818    Specimen: Blood Updated: 10/31/22 0841    Narrative:      The following orders were created for panel order CBC & Differential.  Procedure                               Abnormality         Status                     ---------                               -----------         ------                     CBC Auto Differential[[id number]]        Abnormal            Final result                 Please view results for these tests on the individual orders.    CBC Auto Differential [[id number]]  (Abnormal) Collected: 10/31/22 0818    Specimen: Blood Updated: 10/31/22 0841     WBC 11.03 10*3/mm3      RBC 4.42 10*6/mm3      Hemoglobin 11.7 g/dL      Hematocrit 35.2 %      MCV 79.6 fL      MCH 26.5 pg      MCHC 33.2 g/dL      RDW 13.8 %      RDW-SD 39.8 fl      MPV 10.1 fL      Platelets 217 10*3/mm3      Neutrophil % 71.8 %      Lymphocyte % 19.9 %      Monocyte % 5.7 %      Eosinophil % 1.3 %      Basophil % 0.5 %      Immature Grans % 0.8 %      Neutrophils, Absolute 7.92 " 10*3/mm3      Lymphocytes, Absolute 2.20 10*3/mm3      Monocytes, Absolute 0.63 10*3/mm3      Eosinophils, Absolute 0.14 10*3/mm3      Basophils, Absolute 0.05 10*3/mm3      Immature Grans, Absolute 0.09 10*3/mm3      nRBC 0.0 /100 WBC     Urine Drug Screen - Urine, Clean Catch [[id number]]  (Abnormal) Collected: 10/31/22 0911    Specimen: Urine, Clean Catch Updated: 10/31/22 0954     Amphet/Methamphet, Screen Negative     Barbiturates Screen, Urine Negative     Benzodiazepine Screen, Urine Negative     Cocaine Screen, Urine Negative     Opiate Screen Negative     THC, Screen, Urine Positive     Methadone Screen, Urine Negative     Oxycodone Screen, Urine Negative    Narrative:      Negative Thresholds Per Drugs Screened:    Amphetamines                 500 ng/ml  Barbiturates                 200 ng/ml  Benzodiazepines              100 ng/ml  Cocaine                      300 ng/ml  Methadone                    300 ng/ml  Opiates                      300 ng/ml  Oxycodone                    100 ng/ml  THC                           50 ng/ml    The Normal Value for all drugs tested is negative. This report includes final unconfirmed screening results to be used for medical treatment purposes only. Unconfirmed results must not be used for non-medical purposes such as employment or legal testing. Clinical consideration should be applied to any drug of abuse test, particularly when unconfirmed results are used.            Blood Gas, Arterial, Cord [[id number]]  (Abnormal) Collected: 10/31/22 1612    Specimen: Cord Blood Arterial from Umbilical Cord Updated: 10/31/22 1746     pH, Cord Arterial 7.25 pH Units      pCO2, Cord Arterial 51.2 mmHg      Base Exc, Cord Arterial -5.8 mmol/L      pO2, Cord Arterial <40.5 mmHg      HCO3, Cord Arterial 21.9 mmol/L     Blood Gas, Venous, Cord [[id number]]  (Abnormal) Collected: 10/31/22 1612    Specimen: Cord Blood Venous from Umbilical Cord Updated: 10/31/22 1748     pH, Cord Venous 7.341  pH Units      pCO2, Cord Venous 45.0 mm Hg      pO2, Cord Venous <40.5 mm Hg      Base Excess, Cord Venous -2.2 mmol/L      HCO3, Cord Venous 23.8 mmol/L             Assessment:    Post-partum/postop Day:  1  Status post   THC+     Plan:   Routine postpartum/postop care  Contraception:unsure, [email or web address] discussed  Breast-feeding:: breast/bottle  Advance diet, Ambulate, Remove IV, Shower, PO pain meds, Breast feeding support, Discharge home, DC meds reviewed, Follow up scheduled, PP/PO precautions given,  consult  Doing well day #1. Desires D/C home after 24 hours. Okay for D/C after SW consult and if continues to show continued improvement.     Electronically signed by Eliot Gupta MD, 22, 6:42 AM EDT.

## 2022-11-01 NOTE — ANESTHESIA POSTPROCEDURE EVALUATION
Patient: Raysa Ellis    Procedure Summary     Date: 10/31/22 Room / Location:     Anesthesia Start: 1025 Anesthesia Stop: 1606    Procedure: LABOR ANALGESIA Diagnosis:     Scheduled Providers:  Provider: Enrique Rodriguez MD    Anesthesia Type: epidural ASA Status: 2          Anesthesia Type: epidural    Vitals  Vitals Value Taken Time   /91 11/01/22 0500   Temp 36.8 °C (98.3 °F) 11/01/22 0500   Pulse 86 11/01/22 0500   Resp 16 11/01/22 0500   SpO2 99 % 10/31/22 1619   Vitals shown include unvalidated device data.        Post Anesthesia Care and Evaluation    Patient location during evaluation: bedside  Patient participation: complete - patient participated  Level of consciousness: awake  Pain management: adequate    Airway patency: patent  Anesthetic complications: No anesthetic complications  PONV Status: controlled  Cardiovascular status: acceptable and stable  Respiratory status: acceptable  Hydration status: acceptable  Post Neuraxial Block status: Motor and sensory function returned to baseline and No signs or symptoms of PDPH

## 2022-11-01 NOTE — PLAN OF CARE
Problem: Bleeding (Labor)  Goal: Hemostasis  Outcome: Adequate for Care Transition     Problem: Change in Fetal Wellbeing (Labor)  Goal: Stable Fetal Wellbeing  Outcome: Adequate for Care Transition     Problem: Delayed Labor Progression (Labor)  Goal: Effective Progression to Delivery  Outcome: Adequate for Care Transition     Problem: Infection (Labor)  Goal: Absence of Infection Signs and Symptoms  Outcome: Adequate for Care Transition     Problem: Labor Pain (Labor)  Goal: Acceptable Pain Control  Outcome: Adequate for Care Transition     Problem: Uterine Tachysystole (Labor)  Goal: Normal Uterine Contraction Pattern  Outcome: Adequate for Care Transition   Goal Outcome Evaluation:

## 2022-11-03 LAB
CYTO UR: NORMAL
LAB AP CASE REPORT: NORMAL
LAB AP CLINICAL INFORMATION: NORMAL
PATH REPORT.FINAL DX SPEC: NORMAL
PATH REPORT.GROSS SPEC: NORMAL

## 2022-11-06 ENCOUNTER — TELEPHONE (OUTPATIENT)
Dept: LACTATION | Facility: HOSPITAL | Age: 27
End: 2022-11-06

## 2022-11-23 ENCOUNTER — TELEPHONE (OUTPATIENT)
Dept: OBSTETRICS AND GYNECOLOGY | Facility: CLINIC | Age: 27
End: 2022-11-23

## 2023-02-17 ENCOUNTER — OFFICE VISIT (OUTPATIENT)
Dept: OBSTETRICS AND GYNECOLOGY | Facility: CLINIC | Age: 28
End: 2023-02-17
Payer: COMMERCIAL

## 2023-02-17 VITALS
SYSTOLIC BLOOD PRESSURE: 116 MMHG | BODY MASS INDEX: 23.35 KG/M2 | HEIGHT: 64 IN | DIASTOLIC BLOOD PRESSURE: 77 MMHG | WEIGHT: 136.8 LBS | HEART RATE: 80 BPM

## 2023-02-17 DIAGNOSIS — Z30.013 ENCOUNTER FOR INITIAL PRESCRIPTION OF INJECTABLE CONTRACEPTIVE: Primary | ICD-10-CM

## 2023-02-17 PROCEDURE — 99213 OFFICE O/P EST LOW 20 MIN: CPT | Performed by: NURSE PRACTITIONER

## 2023-02-17 RX ORDER — MEDROXYPROGESTERONE ACETATE 150 MG/ML
150 INJECTION, SUSPENSION INTRAMUSCULAR
Qty: 1 ML | Refills: 3 | Status: SHIPPED | OUTPATIENT
Start: 2023-02-17

## 2023-02-17 NOTE — PROGRESS NOTES
"GYN Problem/Follow Up Visit    Chief Complaint   Patient presents with   • Follow-up     Patient wants depo           HPI  Raysa Ellis is a 27 y.o. female, , who presents to discuss contraception.  Menses monthly, lasting 5-6 days, change products every 6 hours on heavy days. No menstrual cramps    Previous use depo for 1.5 years, last use . Did well on depo and desires to restart       Additional OB/GYN History   Patient's last menstrual period was 2023 (exact date).  Current contraception: contraceptive methods: Condoms    Past Medical History:   Diagnosis Date   • Asymptomatic varicose veins of both lower extremities 10/7/2021   • Current severe episode of major depressive disorder without psychotic features without prior episode (HCC) 2/10/2022   • Delivery by emergency  10/08/2019   • Ectopic pregnancy    • Generalized anxiety disorder 2/10/2022   • Migraine headache 10/29/2021   • S/P excision of ganglion cyst       Past Surgical History:   Procedure Laterality Date   •  SECTION     • SALPINGECTOMY Left 2020      Family History   Problem Relation Age of Onset   • Asthma Mother    • Sleep apnea Mother    • Breast cancer Neg Hx    • Uterine cancer Neg Hx    • Ovarian cancer Neg Hx    • Colon cancer Neg Hx      Allergies as of 2023   • (No Known Allergies)      The additional following portions of the patient's history were reviewed and updated as appropriate: allergies, current medications, past family history, past medical history, past social history, past surgical history and problem list.    Review of Systems    I have reviewed and agree with the HPI, ROS, and historical information as entered above. Jeanie Jeter, APRN    Objective   /77   Pulse 80   Ht 162.6 cm (64\")   Wt 62.1 kg (136 lb 12.8 oz)   LMP 2023 (Exact Date)   Breastfeeding No   BMI 23.48 kg/m²     Physical Exam  Vitals and nursing note reviewed.   Constitutional:       " Appearance: Normal appearance. She is well-developed and well-groomed.   Cardiovascular:      Rate and Rhythm: Normal rate.   Pulmonary:      Effort: Pulmonary effort is normal.   Lymphadenopathy:      Cervical: No cervical adenopathy.   Skin:     General: Skin is warm and dry.   Neurological:      Mental Status: She is alert and oriented to person, place, and time.   Psychiatric:         Mood and Affect: Affect normal.         Cognition and Memory: Cognition normal.          Assessment and Plan    Diagnoses and all orders for this visit:    1. Encounter for initial prescription of injectable contraceptive (Primary)  -     hCG, Quantitative, Pregnancy; Future  -     medroxyPROGESTERone (DEPO-PROVERA) 150 MG/ML injection; Inject 1 mL into the appropriate muscle as directed by prescriber Every 3 (Three) Months.  Dispense: 1 mL; Refill: 3      Counseling:  • TRACK MENSES, RTO if <q21d, >7d long, heavy or painful.    • All BIRTH CONTROL options R/B/A/SE/E of each reviewed in detail.  • SAFE SEX/condoms importance reviewed.     • Daily MVI or calcium/vitamin d supplement    She understands the importance of having the above orders performed in a timely fashion.  The risks of not performing them include, but are not limited to, cancer and/or subsequent increase in morbidity and/or mortality.  She is encouraged to review her results online and/or contact or office if she has questions.     Follow Up:  Return for Monday for bhcg, tuesday for depo administration.        Jeanie Jeter, APRN  02/17/2023

## 2023-02-21 ENCOUNTER — LAB (OUTPATIENT)
Dept: OBSTETRICS AND GYNECOLOGY | Facility: CLINIC | Age: 28
End: 2023-02-21
Payer: COMMERCIAL

## 2023-02-21 DIAGNOSIS — Z30.013 ENCOUNTER FOR INITIAL PRESCRIPTION OF INJECTABLE CONTRACEPTIVE: ICD-10-CM

## 2023-02-21 LAB — HCG INTACT+B SERPL-ACNC: <0.5 MIU/ML

## 2023-02-21 PROCEDURE — 84702 CHORIONIC GONADOTROPIN TEST: CPT | Performed by: NURSE PRACTITIONER

## 2023-02-22 ENCOUNTER — CLINICAL SUPPORT (OUTPATIENT)
Dept: OBSTETRICS AND GYNECOLOGY | Facility: CLINIC | Age: 28
End: 2023-02-22
Payer: COMMERCIAL

## 2023-02-22 DIAGNOSIS — Z30.42 SURVEILLANCE FOR DEPO-PROVERA CONTRACEPTION: Primary | ICD-10-CM

## 2023-02-22 PROCEDURE — 96372 THER/PROPH/DIAG INJ SC/IM: CPT | Performed by: NURSE PRACTITIONER

## 2023-02-22 RX ORDER — MEDROXYPROGESTERONE ACETATE 150 MG/ML
150 INJECTION, SUSPENSION INTRAMUSCULAR
Status: SHIPPED | OUTPATIENT
Start: 2023-02-22

## 2023-02-22 RX ADMIN — MEDROXYPROGESTERONE ACETATE 150 MG: 150 INJECTION, SUSPENSION INTRAMUSCULAR at 15:18

## 2023-02-22 NOTE — PROGRESS NOTES
Patient received Depo today  Administered in Left ventrogluteal  Next injection due between: May 10th - May 24th  Beta HCG: was done and was NEGATIVE   Urine HCG:was not done  [place] in the last two weeks(NA if pt has been receiving depo):not applicable  Last yearly exam/Last Pap Smear: NA/ Pap 3-

## 2023-03-27 VITALS
DIASTOLIC BLOOD PRESSURE: 75 MMHG | BODY MASS INDEX: 23 KG/M2 | TEMPERATURE: 98.3 F | OXYGEN SATURATION: 100 % | HEIGHT: 64 IN | SYSTOLIC BLOOD PRESSURE: 134 MMHG | RESPIRATION RATE: 16 BRPM | HEART RATE: 91 BPM | WEIGHT: 134.7 LBS

## 2023-03-27 LAB
ALBUMIN SERPL-MCNC: 4.6 G/DL (ref 3.5–5.2)
ALBUMIN/GLOB SERPL: 2 G/DL
ALP SERPL-CCNC: 74 U/L (ref 39–117)
ALT SERPL W P-5'-P-CCNC: 12 U/L (ref 1–33)
ANION GAP SERPL CALCULATED.3IONS-SCNC: 10.5 MMOL/L (ref 5–15)
AST SERPL-CCNC: 13 U/L (ref 1–32)
BASOPHILS # BLD AUTO: 0.04 10*3/MM3 (ref 0–0.2)
BASOPHILS NFR BLD AUTO: 0.5 % (ref 0–1.5)
BILIRUB SERPL-MCNC: 0.4 MG/DL (ref 0–1.2)
BUN SERPL-MCNC: 14 MG/DL (ref 6–20)
BUN/CREAT SERPL: 20 (ref 7–25)
CALCIUM SPEC-SCNC: 9.3 MG/DL (ref 8.6–10.5)
CHLORIDE SERPL-SCNC: 104 MMOL/L (ref 98–107)
CO2 SERPL-SCNC: 25.5 MMOL/L (ref 22–29)
CREAT SERPL-MCNC: 0.7 MG/DL (ref 0.57–1)
DEPRECATED RDW RBC AUTO: 36.9 FL (ref 37–54)
EGFRCR SERPLBLD CKD-EPI 2021: 121 ML/MIN/1.73
EOSINOPHIL # BLD AUTO: 0.19 10*3/MM3 (ref 0–0.4)
EOSINOPHIL NFR BLD AUTO: 2.6 % (ref 0.3–6.2)
ERYTHROCYTE [DISTWIDTH] IN BLOOD BY AUTOMATED COUNT: 12.1 % (ref 12.3–15.4)
GLOBULIN UR ELPH-MCNC: 2.3 GM/DL
GLUCOSE SERPL-MCNC: 92 MG/DL (ref 65–99)
HCT VFR BLD AUTO: 39.3 % (ref 34–46.6)
HGB BLD-MCNC: 13.8 G/DL (ref 12–15.9)
HOLD SPECIMEN: NORMAL
HOLD SPECIMEN: NORMAL
IMM GRANULOCYTES # BLD AUTO: 0.01 10*3/MM3 (ref 0–0.05)
IMM GRANULOCYTES NFR BLD AUTO: 0.1 % (ref 0–0.5)
LYMPHOCYTES # BLD AUTO: 2.77 10*3/MM3 (ref 0.7–3.1)
LYMPHOCYTES NFR BLD AUTO: 37.8 % (ref 19.6–45.3)
MCH RBC QN AUTO: 29 PG (ref 26.6–33)
MCHC RBC AUTO-ENTMCNC: 35.1 G/DL (ref 31.5–35.7)
MCV RBC AUTO: 82.6 FL (ref 79–97)
MONOCYTES # BLD AUTO: 0.57 10*3/MM3 (ref 0.1–0.9)
MONOCYTES NFR BLD AUTO: 7.8 % (ref 5–12)
NEUTROPHILS NFR BLD AUTO: 3.75 10*3/MM3 (ref 1.7–7)
NEUTROPHILS NFR BLD AUTO: 51.2 % (ref 42.7–76)
NRBC BLD AUTO-RTO: 0 /100 WBC (ref 0–0.2)
PLATELET # BLD AUTO: 186 10*3/MM3 (ref 140–450)
PMV BLD AUTO: 10.5 FL (ref 6–12)
POTASSIUM SERPL-SCNC: 3.8 MMOL/L (ref 3.5–5.2)
PROT SERPL-MCNC: 6.9 G/DL (ref 6–8.5)
RBC # BLD AUTO: 4.76 10*6/MM3 (ref 3.77–5.28)
SODIUM SERPL-SCNC: 140 MMOL/L (ref 136–145)
WBC NRBC COR # BLD: 7.33 10*3/MM3 (ref 3.4–10.8)
WHOLE BLOOD HOLD COAG: NORMAL
WHOLE BLOOD HOLD SPECIMEN: NORMAL

## 2023-03-27 PROCEDURE — 99281 EMR DPT VST MAYX REQ PHY/QHP: CPT

## 2023-03-27 PROCEDURE — 85025 COMPLETE CBC W/AUTO DIFF WBC: CPT

## 2023-03-27 PROCEDURE — 80053 COMPREHEN METABOLIC PANEL: CPT

## 2023-03-27 PROCEDURE — 36415 COLL VENOUS BLD VENIPUNCTURE: CPT

## 2023-03-28 ENCOUNTER — HOSPITAL ENCOUNTER (EMERGENCY)
Facility: HOSPITAL | Age: 28
Discharge: HOME | End: 2023-03-28
Admitting: EMERGENCY MEDICINE
Payer: COMMERCIAL

## 2023-03-28 NOTE — ED PROVIDER NOTES
Time: 10:59 PM EDT  Date of encounter:  3/27/2023  Independent Historian/Clinical History and Information was obtained by:   Patient  Chief Complaint   Patient presents with   • Leg Swelling   • Leg Pain       History is limited by: N/A    History of Present Illness:  Patient is a 27 y.o. year old female who presents to the emergency department for evaluation of left leg pain and swelling.  She had a vascular procedure recently and the pain in her left inner thigh has progressively gotten worse.  She is bruised and swollen.  She contacted her doctor's office who advised her to come to the ER.    HPI    Patient Care Team  Primary Care Provider: Provider, No Known    Past Medical History:     No Known Allergies  Past Medical History:   Diagnosis Date   • Asymptomatic varicose veins of both lower extremities 10/7/2021   • Current severe episode of major depressive disorder without psychotic features without prior episode (HCC) 2/10/2022   • Delivery by emergency  10/08/2019   • Ectopic pregnancy    • Generalized anxiety disorder 2/10/2022   • Migraine headache 10/29/2021   • S/P excision of ganglion cyst      Past Surgical History:   Procedure Laterality Date   •  SECTION     • SALPINGECTOMY Left 2020     Family History   Problem Relation Age of Onset   • Asthma Mother    • Sleep apnea Mother    • Breast cancer Neg Hx    • Uterine cancer Neg Hx    • Ovarian cancer Neg Hx    • Colon cancer Neg Hx        Home Medications:  Prior to Admission medications    Medication Sig Start Date End Date Taking? Authorizing Provider   medroxyPROGESTERone (DEPO-PROVERA) 150 MG/ML injection Inject 1 mL into the appropriate muscle as directed by prescriber Every 3 (Three) Months. 23   Jeanie Jeter APRN   neomycin-polymyxin-dexamethasone (MAXITROL) 0.1 % ophthalmic suspension Please administer 4 drops in each ear 3-4 times daily for 7 days 10/13/22   Gertrude Minaya APRN   Prenatal Vit-Fe  "Fumarate-FA (PRENATAL PO) Take  by mouth.    Provider, Cong, MD        Social History:   Social History     Tobacco Use   • Smoking status: Former     Packs/day: 0.50     Years: 5.00     Pack years: 2.50     Types: Cigarettes     Quit date:      Years since quittin.2   • Smokeless tobacco: Never   Vaping Use   • Vaping Use: Never used   Substance Use Topics   • Alcohol use: No   • Drug use: No         Review of Systems:  Review of Systems   Cardiovascular: Positive for leg swelling.   Musculoskeletal: Positive for gait problem.   Skin: Positive for color change.        Physical Exam:  /75 (BP Location: Right arm, Patient Position: Sitting)   Pulse 91   Temp 98.3 °F (36.8 °C) (Oral)   Resp 16   Ht 162.6 cm (64\")   Wt 61.1 kg (134 lb 11.2 oz)   LMP 2023 (Approximate)   SpO2 100%   BMI 23.12 kg/m²     Physical Exam  Constitutional:       Appearance: Normal appearance. She is not ill-appearing.   Cardiovascular:      Rate and Rhythm: Normal rate.   Pulmonary:      Effort: Pulmonary effort is normal. No respiratory distress.   Musculoskeletal:         General: Swelling and tenderness present.   Skin:     Findings: Bruising present.   Neurological:      General: No focal deficit present.      Mental Status: She is alert and oriented to person, place, and time.                  Procedures:  Procedures      Medical Decision Making:      Comorbidities that affect care:    varicose veins    External Notes reviewed:    None      The following orders were placed and all results were independently analyzed by me:  Orders Placed This Encounter   Procedures   • [place] Draw   • Comprehensive Metabolic Panel   • CBC Auto Differential   • NPO Diet NPO Type: Strict NPO   • Undress & Gown   • CBC & Differential   • Green Top (Gel)   • Lavender Top   • Gold Top - SST   • Light Blue Top       Medications Given in the Emergency Department:  Medications - No data to display     ED Course:    The patient " was initially evaluated in the triage area where orders were placed. The patient was later dispositioned by ALBARO Johnston.      The patient was advised to stay for completion of workup which includes but is not limited to communication of labs and radiological results, reassessment and plan. The patient was advised that leaving prior to disposition by a provider could result in critical findings that are not communicated to the patient.          Labs:    Lab Results (last 24 hours)     Procedure Component Value Units Date/Time    CBC & Differential [[id number]]  (Abnormal) Collected: 03/27/23 2225    Specimen: Blood Updated: 03/27/23 2253    Narrative:      The following orders were created for panel order CBC & Differential.  Procedure                               Abnormality         Status                     ---------                               -----------         ------                     CBC Auto Differential[[id number]]        Abnormal            Final result                 Please view results for these tests on the individual orders.    Comprehensive Metabolic Panel [[id number]] Collected: 03/27/23 2225    Specimen: Blood Updated: 03/27/23 2248    CBC Auto Differential [[id number]]  (Abnormal) Collected: 03/27/23 2225    Specimen: Blood Updated: 03/27/23 2253     WBC 7.33 10*3/mm3      RBC 4.76 10*6/mm3      Hemoglobin 13.8 g/dL      Hematocrit 39.3 %      MCV 82.6 fL      MCH 29.0 pg      MCHC 35.1 g/dL      RDW 12.1 %      RDW-SD 36.9 fl      MPV 10.5 fL      Platelets 186 10*3/mm3      Neutrophil % 51.2 %      Lymphocyte % 37.8 %      Monocyte % 7.8 %      Eosinophil % 2.6 %      Basophil % 0.5 %      Immature Grans % 0.1 %      Neutrophils, Absolute 3.75 10*3/mm3      Lymphocytes, Absolute 2.77 10*3/mm3      Monocytes, Absolute 0.57 10*3/mm3      Eosinophils, Absolute 0.19 10*3/mm3      Basophils, Absolute 0.04 10*3/mm3      Immature Grans, Absolute 0.01 10*3/mm3      nRBC 0.0 /100 WBC             Imaging:    No Radiology Exams Resulted Within Past 24 Hours      Differential Diagnosis and Discussion:      Extremity Pain: Differential diagnosis includes but is not limited to soft tissue sprain, tendonitis, tendon injury, dislocation, fracture, deep vein thrombosis, arterial insufficiency, osteoarthritis, bursitis, and ligamentous damage.        [facility]         Patient Care Considerations:    DUPLEX ULTRASOUND: I considered ordering a duplex ultrasound, however the patient is low risk for dvt and has no signs of arterial occlusion.      Consultants/Shared Management Plan:    None    Social Determinants of Health:    Patient is independent, reliable, and has access to care.       Disposition and Care Coordination:    [place]: This patient has left the emergency department or waiting room with no communication to myself, nursing or administrative staff. There was no opportunity to discuss the patient's decision to leave, provide medical advice or discuss alternatives to. The staff has made efforts to locate patient without success.        Final diagnoses:   None        ED Disposition     None          This medical record created using voice recognition software.           Erna Rebolledo, APRN  04/04/23 0718

## 2023-03-29 ENCOUNTER — HOSPITAL ENCOUNTER (EMERGENCY)
Facility: HOSPITAL | Age: 28
Discharge: HOME | End: 2023-03-30
Attending: EMERGENCY MEDICINE | Admitting: EMERGENCY MEDICINE
Payer: COMMERCIAL

## 2023-03-29 ENCOUNTER — APPOINTMENT (OUTPATIENT)
Dept: CT IMAGING | Facility: HOSPITAL | Age: 28
End: 2023-03-29
Payer: COMMERCIAL

## 2023-03-29 ENCOUNTER — APPOINTMENT (OUTPATIENT)
Dept: GENERAL RADIOLOGY | Facility: HOSPITAL | Age: 28
End: 2023-03-29
Payer: COMMERCIAL

## 2023-03-29 ENCOUNTER — TELEMEDICINE (OUTPATIENT)
Dept: FAMILY MEDICINE CLINIC | Facility: TELEHEALTH | Age: 28
End: 2023-03-29
Payer: COMMERCIAL

## 2023-03-29 DIAGNOSIS — S70.12XA HEMATOMA OF LEFT THIGH, INITIAL ENCOUNTER: Primary | ICD-10-CM

## 2023-03-29 DIAGNOSIS — R06.00 DYSPNEA, UNSPECIFIED TYPE: ICD-10-CM

## 2023-03-29 DIAGNOSIS — G89.18 ACUTE POSTOPERATIVE PAIN OF EXTREMITY: ICD-10-CM

## 2023-03-29 DIAGNOSIS — R07.9 CHEST PAIN, UNSPECIFIED TYPE: Primary | ICD-10-CM

## 2023-03-29 LAB
BASOPHILS # BLD AUTO: 0.03 10*3/MM3 (ref 0–0.2)
BASOPHILS NFR BLD AUTO: 0.4 % (ref 0–1.5)
DEPRECATED RDW RBC AUTO: 34.6 FL (ref 37–54)
EOSINOPHIL # BLD AUTO: 0.11 10*3/MM3 (ref 0–0.4)
EOSINOPHIL NFR BLD AUTO: 1.6 % (ref 0.3–6.2)
ERYTHROCYTE [DISTWIDTH] IN BLOOD BY AUTOMATED COUNT: 11.9 % (ref 12.3–15.4)
HCT VFR BLD AUTO: 38.1 % (ref 34–46.6)
HGB BLD-MCNC: 13.7 G/DL (ref 12–15.9)
HOLD SPECIMEN: NORMAL
HOLD SPECIMEN: NORMAL
IMM GRANULOCYTES # BLD AUTO: 0.02 10*3/MM3 (ref 0–0.05)
IMM GRANULOCYTES NFR BLD AUTO: 0.3 % (ref 0–0.5)
LYMPHOCYTES # BLD AUTO: 2.03 10*3/MM3 (ref 0.7–3.1)
LYMPHOCYTES NFR BLD AUTO: 29 % (ref 19.6–45.3)
MCH RBC QN AUTO: 29.1 PG (ref 26.6–33)
MCHC RBC AUTO-ENTMCNC: 36 G/DL (ref 31.5–35.7)
MCV RBC AUTO: 80.9 FL (ref 79–97)
MONOCYTES # BLD AUTO: 0.49 10*3/MM3 (ref 0.1–0.9)
MONOCYTES NFR BLD AUTO: 7 % (ref 5–12)
NEUTROPHILS NFR BLD AUTO: 4.33 10*3/MM3 (ref 1.7–7)
NEUTROPHILS NFR BLD AUTO: 61.7 % (ref 42.7–76)
NRBC BLD AUTO-RTO: 0 /100 WBC (ref 0–0.2)
NT-PROBNP SERPL-MCNC: <36 PG/ML (ref 0–450)
PLATELET # BLD AUTO: 180 10*3/MM3 (ref 140–450)
PMV BLD AUTO: 10.2 FL (ref 6–12)
RBC # BLD AUTO: 4.71 10*6/MM3 (ref 3.77–5.28)
WBC NRBC COR # BLD: 7.01 10*3/MM3 (ref 3.4–10.8)
WHOLE BLOOD HOLD COAG: NORMAL
WHOLE BLOOD HOLD SPECIMEN: NORMAL

## 2023-03-29 PROCEDURE — 83880 ASSAY OF NATRIURETIC PEPTIDE: CPT | Performed by: EMERGENCY MEDICINE

## 2023-03-29 PROCEDURE — 99213 OFFICE O/P EST LOW 20 MIN: CPT | Performed by: NURSE PRACTITIONER

## 2023-03-29 PROCEDURE — 93010 ELECTROCARDIOGRAM REPORT: CPT | Performed by: INTERNAL MEDICINE

## 2023-03-29 PROCEDURE — 80053 COMPREHEN METABOLIC PANEL: CPT | Performed by: EMERGENCY MEDICINE

## 2023-03-29 PROCEDURE — 99284 EMERGENCY DEPT VISIT MOD MDM: CPT

## 2023-03-29 PROCEDURE — 85025 COMPLETE CBC W/AUTO DIFF WBC: CPT

## 2023-03-29 PROCEDURE — 93005 ELECTROCARDIOGRAM TRACING: CPT

## 2023-03-29 PROCEDURE — 84484 ASSAY OF TROPONIN QUANT: CPT | Performed by: EMERGENCY MEDICINE

## 2023-03-29 PROCEDURE — 93005 ELECTROCARDIOGRAM TRACING: CPT | Performed by: EMERGENCY MEDICINE

## 2023-03-29 PROCEDURE — 71045 X-RAY EXAM CHEST 1 VIEW: CPT

## 2023-03-29 PROCEDURE — 71260 CT THORAX DX C+: CPT

## 2023-03-29 RX ORDER — ONDANSETRON 2 MG/ML
4 INJECTION INTRAMUSCULAR; INTRAVENOUS ONCE
Status: COMPLETED | OUTPATIENT
Start: 2023-03-30 | End: 2023-03-30

## 2023-03-29 RX ORDER — SODIUM CHLORIDE 0.9 % (FLUSH) 0.9 %
10 SYRINGE (ML) INJECTION AS NEEDED
Status: DISCONTINUED | OUTPATIENT
Start: 2023-03-29 | End: 2023-03-30 | Stop reason: HOSPADM

## 2023-03-29 RX ORDER — LORAZEPAM 2 MG/ML
0.5 INJECTION INTRAMUSCULAR ONCE
Status: COMPLETED | OUTPATIENT
Start: 2023-03-30 | End: 2023-03-30

## 2023-03-29 NOTE — PROGRESS NOTES
You have chosen to receive care through a telehealth visit.  Do you consent to use a video/audio connection for your medical care today? Yes     CHIEF COMPLAINT  Chief Complaint   Patient presents with   • post op bruising         HPI  Raysa Ellis is a 27 y.o. female  presents with complaint of increased bruising to left inner thigh.  She had an ablation done last Wednesday.  Insertion site was below the left knee.  She states that the bruising is worse tonight that yesterday.  She states that that bruised area is not hard or warm to touch.  Pedal pulse is positive to left foot.  She denies any chest pain or shortness of breath.    Review of Systems   Skin: Positive for color change (Bruising to left inner thigh).   All other systems reviewed and are negative.      Past Medical History:   Diagnosis Date   • Asymptomatic varicose veins of both lower extremities 10/7/2021   • Current severe episode of major depressive disorder without psychotic features without prior episode (HCC) 2/10/2022   • Delivery by emergency  10/08/2019   • Ectopic pregnancy    • Generalized anxiety disorder 2/10/2022   • Migraine headache 10/29/2021   • S/P excision of ganglion cyst 2017       Family History   Problem Relation Age of Onset   • Asthma Mother    • Sleep apnea Mother    • Breast cancer Neg Hx    • Uterine cancer Neg Hx    • Ovarian cancer Neg Hx    • Colon cancer Neg Hx        Social History     Socioeconomic History   • Marital status: Single   • Number of children: 1   • Years of education: 11   • Highest education level: 11th grade   Tobacco Use   • Smoking status: Former     Packs/day: 0.50     Years: 5.00     Pack years: 2.50     Types: Cigarettes     Quit date: 2018     Years since quittin.2   • Smokeless tobacco: Never   Vaping Use   • Vaping Use: Never used   Substance and Sexual Activity   • Alcohol use: No   • Drug use: No   • Sexual activity: Yes     Partners: Male     Birth control/protection: Condom        Raysa Ellis  reports that she quit smoking about 5 years ago. Her smoking use included cigarettes. She has a 2.50 pack-year smoking history. She has never used smokeless tobacco..  LMP 03/12/2023 (Approximate)     PHYSICAL EXAM  Physical Exam   Constitutional: She appears well-developed and well-nourished.   HENT:   Head: Normocephalic.   Eyes: Pupils are equal, round, and reactive to light.   Pulmonary/Chest: Effort normal.   Musculoskeletal: Normal range of motion.   Neurological: She is alert.   Skin:            Results for orders placed or performed during the hospital encounter of 03/28/23   Comprehensive Metabolic Panel    Specimen: Blood   Result Value Ref Range    Glucose 92 65 - 99 mg/dL    BUN 14 6 - 20 mg/dL    Creatinine 0.70 0.57 - 1.00 mg/dL    Sodium 140 136 - 145 mmol/L    Potassium 3.8 3.5 - 5.2 mmol/L    Chloride 104 98 - 107 mmol/L    CO2 25.5 22.0 - 29.0 mmol/L    Calcium 9.3 8.6 - 10.5 mg/dL    Total Protein 6.9 6.0 - 8.5 g/dL    Albumin 4.6 3.5 - 5.2 g/dL    ALT (SGPT) 12 1 - 33 U/L    AST (SGOT) 13 1 - 32 U/L    Alkaline Phosphatase 74 39 - 117 U/L    Total Bilirubin 0.4 0.0 - 1.2 mg/dL    Globulin 2.3 gm/dL    A/G Ratio 2.0 g/dL    BUN/Creatinine Ratio 20.0 7.0 - 25.0    Anion Gap 10.5 5.0 - 15.0 mmol/L    eGFR 121.0 >60.0 mL/min/1.73   CBC Auto Differential    Specimen: Blood   Result Value Ref Range    WBC 7.33 3.40 - 10.80 10*3/mm3    RBC 4.76 3.77 - 5.28 10*6/mm3    Hemoglobin 13.8 12.0 - 15.9 g/dL    Hematocrit 39.3 34.0 - 46.6 %    MCV 82.6 79.0 - 97.0 fL    MCH 29.0 26.6 - 33.0 pg    MCHC 35.1 31.5 - 35.7 g/dL    RDW 12.1 (L) 12.3 - 15.4 %    RDW-SD 36.9 (L) 37.0 - 54.0 fl    MPV 10.5 6.0 - 12.0 fL    Platelets 186 140 - 450 10*3/mm3    Neutrophil % 51.2 42.7 - 76.0 %    Lymphocyte % 37.8 19.6 - 45.3 %    Monocyte % 7.8 5.0 - 12.0 %    Eosinophil % 2.6 0.3 - 6.2 %    Basophil % 0.5 0.0 - 1.5 %    Immature Grans % 0.1 0.0 - 0.5 %    Neutrophils, Absolute 3.75 1.70 - 7.00  10*3/mm3    Lymphocytes, Absolute 2.77 0.70 - 3.10 10*3/mm3    Monocytes, Absolute 0.57 0.10 - 0.90 10*3/mm3    Eosinophils, Absolute 0.19 0.00 - 0.40 10*3/mm3    Basophils, Absolute 0.04 0.00 - 0.20 10*3/mm3    Immature Grans, Absolute 0.01 0.00 - 0.05 10*3/mm3    nRBC 0.0 0.0 - 0.2 /100 WBC   Green Top (Gel)   Result Value Ref Range    Extra Tube Hold for add-ons.    Lavender Top   Result Value Ref Range    Extra Tube hold for add-on    Gold Top - SST   Result Value Ref Range    Extra Tube Hold for add-ons.    Light Blue Top   Result Value Ref Range    Extra Tube Hold for add-ons.        Diagnoses and all orders for this visit:    1. Hematoma of left thigh, initial encounter (Primary)          FOLLOW-UP  As discussed during visit with PCP/[facility] if no improvement or Urgent Care/Emergency Department if worsening of symptoms    nstructed pt to continue to check pedal pulses.    Instructed pt if bruised area becomes larger, if unable to feel pedal pulses, chest pain or shortness of breath to go to ER.    Instructed pt to call her surgeon in the morning.    Patient verbalizes understanding of medication dosage, comfort measures, instructions for treatment and follow-up.    Ruth Howell, APRN  03/29/2023  23:59 EDT    The use of a video visit has been reviewed with the patient and verbal informed consent has been obtained. Myself and Raysa Ellis participated in this visit. The patient is located in [address] [place] [address].    I am located in [place], [place]. Mychart and Twilio were utilized. I spent 20 minutes in the patient's chart for this visit.

## 2023-03-30 VITALS
RESPIRATION RATE: 18 BRPM | SYSTOLIC BLOOD PRESSURE: 99 MMHG | HEART RATE: 73 BPM | HEIGHT: 64 IN | TEMPERATURE: 98.6 F | DIASTOLIC BLOOD PRESSURE: 59 MMHG | WEIGHT: 138.23 LBS | BODY MASS INDEX: 23.6 KG/M2 | OXYGEN SATURATION: 97 %

## 2023-03-30 LAB
ALBUMIN SERPL-MCNC: 4.4 G/DL (ref 3.5–5.2)
ALBUMIN/GLOB SERPL: 1.8 G/DL
ALP SERPL-CCNC: 74 U/L (ref 39–117)
ALT SERPL W P-5'-P-CCNC: 12 U/L (ref 1–33)
ANION GAP SERPL CALCULATED.3IONS-SCNC: 12.5 MMOL/L (ref 5–15)
AST SERPL-CCNC: 14 U/L (ref 1–32)
BILIRUB SERPL-MCNC: 0.4 MG/DL (ref 0–1.2)
BUN SERPL-MCNC: 12 MG/DL (ref 6–20)
BUN/CREAT SERPL: 16.4 (ref 7–25)
CALCIUM SPEC-SCNC: 9.6 MG/DL (ref 8.6–10.5)
CHLORIDE SERPL-SCNC: 104 MMOL/L (ref 98–107)
CO2 SERPL-SCNC: 23.5 MMOL/L (ref 22–29)
CREAT SERPL-MCNC: 0.73 MG/DL (ref 0.57–1)
EGFRCR SERPLBLD CKD-EPI 2021: 115 ML/MIN/1.73
GLOBULIN UR ELPH-MCNC: 2.4 GM/DL
GLUCOSE SERPL-MCNC: 134 MG/DL (ref 65–99)
POTASSIUM SERPL-SCNC: 3.5 MMOL/L (ref 3.5–5.2)
PROT SERPL-MCNC: 6.8 G/DL (ref 6–8.5)
SODIUM SERPL-SCNC: 140 MMOL/L (ref 136–145)
TROPONIN T SERPL HS-MCNC: <6 NG/L

## 2023-03-30 PROCEDURE — 25010000002 LORAZEPAM PER 2 MG: Performed by: EMERGENCY MEDICINE

## 2023-03-30 PROCEDURE — 25010000002 MORPHINE PER 10 MG: Performed by: EMERGENCY MEDICINE

## 2023-03-30 PROCEDURE — 96375 TX/PRO/DX INJ NEW DRUG ADDON: CPT

## 2023-03-30 PROCEDURE — 25010000002 ONDANSETRON PER 1 MG: Performed by: EMERGENCY MEDICINE

## 2023-03-30 PROCEDURE — 25510000001 IOPAMIDOL PER 1 ML: Performed by: EMERGENCY MEDICINE

## 2023-03-30 PROCEDURE — 96374 THER/PROPH/DIAG INJ IV PUSH: CPT

## 2023-03-30 RX ADMIN — LORAZEPAM 0.5 MG: 2 INJECTION INTRAMUSCULAR; INTRAVENOUS at 00:27

## 2023-03-30 RX ADMIN — ONDANSETRON 4 MG: 2 INJECTION INTRAMUSCULAR; INTRAVENOUS at 00:23

## 2023-03-30 RX ADMIN — IOPAMIDOL 64 ML: 755 INJECTION, SOLUTION INTRAVENOUS at 00:08

## 2023-03-30 RX ADMIN — MORPHINE SULFATE 4 MG: 4 INJECTION, SOLUTION INTRAMUSCULAR; INTRAVENOUS at 00:19

## 2023-03-30 NOTE — ED TRIAGE NOTES
Pt had an endovenous laser one week ago on left leg. Tonight sitting at home with sudden onset of shortness of breath and midsternal chest pain. Pt was tachycardiac prior to transport by EMS. Pt is demonstrating shortness of breath with speaking at this time.

## 2023-03-31 LAB — QT INTERVAL: 346 MS

## 2023-05-02 ENCOUNTER — TELEPHONE (OUTPATIENT)
Dept: OBSTETRICS AND GYNECOLOGY | Facility: CLINIC | Age: 28
End: 2023-05-02
Payer: COMMERCIAL

## 2023-05-02 DIAGNOSIS — Z30.013 ENCOUNTER FOR INITIAL PRESCRIPTION OF INJECTABLE CONTRACEPTIVE: ICD-10-CM

## 2023-05-02 RX ORDER — MEDROXYPROGESTERONE ACETATE 150 MG/ML
150 INJECTION, SUSPENSION INTRAMUSCULAR
Qty: 1 ML | Refills: 2 | Status: SHIPPED | OUTPATIENT
Start: 2023-05-02

## 2023-05-12 ENCOUNTER — CLINICAL SUPPORT (OUTPATIENT)
Dept: OBSTETRICS AND GYNECOLOGY | Facility: CLINIC | Age: 28
End: 2023-05-12
Payer: COMMERCIAL

## 2023-05-12 DIAGNOSIS — Z30.42 ENCOUNTER FOR DEPO-PROVERA CONTRACEPTION: Primary | ICD-10-CM

## 2023-05-12 NOTE — PROGRESS NOTES
Patient received Depo today  Administered in Left  Ventrogluteal  Next injection due between:Jul 28th to August 11th  Beta HCG: was done and was NEGATIVE   Urine HCG:was not done  [place] in the last two weeks(NA if pt has been receiving depo):not applicable  Last yearly exam/Last Pap Smear: 2/17/2023  Pt supplied.  Pt tolerated well.  SG

## 2023-07-28 ENCOUNTER — CLINICAL SUPPORT (OUTPATIENT)
Dept: OBSTETRICS AND GYNECOLOGY | Facility: CLINIC | Age: 28
End: 2023-07-28
Payer: COMMERCIAL

## 2023-07-28 DIAGNOSIS — Z30.42 SURVEILLANCE FOR DEPO-PROVERA CONTRACEPTION: Primary | ICD-10-CM

## 2023-07-28 RX ORDER — MEDROXYPROGESTERONE ACETATE 150 MG/ML
150 INJECTION, SUSPENSION INTRAMUSCULAR
Status: SHIPPED | OUTPATIENT
Start: 2023-07-28

## 2023-07-28 RX ADMIN — MEDROXYPROGESTERONE ACETATE 150 MG: 150 INJECTION, SUSPENSION INTRAMUSCULAR at 15:41

## 2023-07-28 NOTE — PROGRESS NOTES
Patient received Depo today  Administered in Right  Ventrogluteal  Next injection due between: Oct. 13th - Oct. 27th  Beta HCG: was not done  Urine HCG:was not done  [place] in the last two weeks(NA if pt has been receiving depo):not applicable  Last yearly exam/Last Pap Smear: NA/ 3- NILM

## 2023-08-11 ENCOUNTER — OFFICE VISIT (OUTPATIENT)
Dept: ORTHOPEDIC SURGERY | Facility: CLINIC | Age: 28
End: 2023-08-11
Payer: COMMERCIAL

## 2023-08-11 VITALS — BODY MASS INDEX: 23.56 KG/M2 | HEIGHT: 64 IN | WEIGHT: 138 LBS

## 2023-08-11 DIAGNOSIS — M67.432 GANGLION OF LEFT WRIST: Primary | ICD-10-CM

## 2023-08-11 NOTE — PROGRESS NOTES
"Chief Complaint  Initial Evaluation of the Left Hand     Subjective      Raysa Ellis presents to [facility] for initial evaluation of the left hand. She has a cyst on the top of her wrist.  She carries her baby around and has pain.  She had an excision of the cyst in the past. She stated the cyst was gone about 2 years.  She states it causes some pain and stiffness at times. She states the cyst is small today but varies in size.      No Known Allergies     Social History     Socioeconomic History    Marital status: Single    Number of children: 1    Years of education: 11    Highest education level: 11th grade   Tobacco Use    Smoking status: Former     Packs/day: 0.50     Years: 5.00     Pack years: 2.50     Types: Cigarettes     Quit date:      Years since quittin.6    Smokeless tobacco: Never   Vaping Use    Vaping Use: Never used   Substance and Sexual Activity    Alcohol use: No    Drug use: No    Sexual activity: Yes     Partners: Male     Birth control/protection: Condom        I reviewed the patient's chief complaint, history of present illness, review of systems, past medical history, surgical history, family history, social history, medications, and allergy list.     Review of Systems     Constitutional: Denies fevers, chills, weight loss  Cardiovascular: Denies chest pain, shortness of breath  Skin: Denies rashes, acute skin changes  Neurologic: Denies headache, loss of consciousness        Vital Signs:   Ht 162.6 cm (64\")   Wt 62.6 kg (138 lb)   BMI 23.69 kg/mý          Physical Exam  General: Alert. No acute distress    Ortho Exam        LEFT HAND  Full ROM of the hand, fingers, elbow and wrist. . Sensation grossly intact to light touch, median, radial and ulnar nerve. Positive AIN, PIN and ulnar nerve motor function intact. Axillary nerve intact. Positive pulses.        Procedures      Imaging Results (Most Recent)       None             Result Review :      "      Assessment and Plan     Diagnoses and all orders for this visit:    1. Ganglion of left wrist (Primary)        Discussed the treatment plan with the patient.     Issued left wrist brace.          Call or return if worsening symptoms.    Follow Up     PRN      Patient was given instructions and counseling regarding her condition or for health maintenance advice. Please see specific information pulled into the AVS if appropriate.     Scribed for Lester Hu MD by Marlene Hannah MA.  08/11/23   10:23 EDT    I have personally performed the services described in this document as scribed by the above individual and it is both accurate and complete. Lester Hu MD 08/15/23

## 2023-08-18 ENCOUNTER — OFFICE VISIT (OUTPATIENT)
Dept: INTERNAL MEDICINE | Facility: CLINIC | Age: 28
End: 2023-08-18
Payer: COMMERCIAL

## 2023-08-18 VITALS
RESPIRATION RATE: 16 BRPM | BODY MASS INDEX: 25.1 KG/M2 | WEIGHT: 147 LBS | HEART RATE: 84 BPM | DIASTOLIC BLOOD PRESSURE: 78 MMHG | SYSTOLIC BLOOD PRESSURE: 110 MMHG | TEMPERATURE: 98.9 F | HEIGHT: 64 IN | OXYGEN SATURATION: 98 %

## 2023-08-18 DIAGNOSIS — F41.1 GENERALIZED ANXIETY DISORDER: ICD-10-CM

## 2023-08-18 DIAGNOSIS — F33.1 MAJOR DEPRESSIVE DISORDER, RECURRENT EPISODE, MODERATE DEGREE: Primary | ICD-10-CM

## 2023-08-18 PROCEDURE — 1159F MED LIST DOCD IN RCRD: CPT | Performed by: PHYSICIAN ASSISTANT

## 2023-08-18 PROCEDURE — 1160F RVW MEDS BY RX/DR IN RCRD: CPT | Performed by: PHYSICIAN ASSISTANT

## 2023-08-18 PROCEDURE — 99203 OFFICE O/P NEW LOW 30 MIN: CPT | Performed by: PHYSICIAN ASSISTANT

## 2023-08-18 RX ORDER — ESCITALOPRAM OXALATE 10 MG/1
10 TABLET ORAL DAILY
Qty: 30 TABLET | Refills: 1 | Status: SHIPPED | OUTPATIENT
Start: 2023-08-18

## 2023-08-18 RX ORDER — BUSPIRONE HYDROCHLORIDE 5 MG/1
5 TABLET ORAL 3 TIMES DAILY PRN
Qty: 90 TABLET | Refills: 0 | Status: SHIPPED | OUTPATIENT
Start: 2023-08-18

## 2023-08-18 NOTE — PROGRESS NOTES
Chief Complaint  Anxiety, Depression, and Establish Care    Subjective          Raysa Ellis presents to [facility]    Last PCP: Rufus Muñiz  Previous Specialists: none   Last labs:   Last mammogram: never, no family history of breast cancer  Last pap:  with OB. WNL per pt.  Last colonoscopy: never,  no family history of colon cancer     Lost a child in . He was 2 months old and passed from sids.  States she is very anxious about her youngest daughter breathing now  Depressed 5/7 days/wk  Anxious feeling daily  Tried Zoloft and hydroxyzine in the past without significant improvement   Pt admits panic attacks - sx happen late at night.   Pt states she tries to catch breath to get fresh air and calm self but it does not help. Sx usually last 1-2 hrs.   Denies manic symptoms, staying awake days at a time, excessive spending, feeling invincible. Denies previous diagnosis of bipolar disorder.  Denies si/hi   Pt feels safe at home.      Past Medical History:   Diagnosis Date    Asymptomatic varicose veins of both lower extremities 10/7/2021    Current severe episode of major depressive disorder without psychotic features without prior episode 2/10/2022    Delivery by emergency  10/08/2019    Ectopic pregnancy     Generalized anxiety disorder 2/10/2022    Migraine headache 10/29/2021    S/P excision of ganglion cyst         Past Surgical History:   Procedure Laterality Date     SECTION  2019    ENDOVENOUS ABLATION SAPHENOUS VEIN W/ LASER  2023    SALPINGECTOMY Left 2020    WRIST GANGLION EXCISION  2018        No current outpatient medications on file prior to visit.     No current facility-administered medications on file prior to visit.        No Known Allergies    Social History     Tobacco Use   Smoking Status Former    Packs/day: 0.50    Years: 5.00    Pack years: 2.50    Types: Cigarettes    Quit date: 2018    Years since  "quittin.6   Smokeless Tobacco Never          Objective   Vital Signs:   /78 (BP Location: Left arm, Patient Position: Sitting, Cuff Size: Adult)   Pulse 84   Temp 98.9 øF (37.2 øC) (Temporal)   Resp 16   Ht 162.6 cm (64\")   Wt 66.7 kg (147 lb)   SpO2 98%   BMI 25.23 kg/mý     Physical Exam  Vitals reviewed.   Constitutional:       Appearance: Normal appearance.   HENT:      Head: Normocephalic and atraumatic.      Nose: Nose normal.      Mouth/Throat:      Mouth: Mucous membranes are moist.   Eyes:      Extraocular Movements: Extraocular movements intact.      Conjunctiva/sclera: Conjunctivae normal.      Pupils: Pupils are equal, round, and reactive to light.   Cardiovascular:      Rate and Rhythm: Normal rate and regular rhythm.   Pulmonary:      Effort: Pulmonary effort is normal.      Breath sounds: Normal breath sounds.   Abdominal:      General: Abdomen is flat. Bowel sounds are normal.      Palpations: Abdomen is soft.   Musculoskeletal:         General: Normal range of motion.   Neurological:      General: No focal deficit present.      Mental Status: She is alert and oriented to person, place, and time.   Psychiatric:         Mood and Affect: Mood normal.      Result Review :            BMI is >= 25 and <30. (Overweight) The following options were offered after discussion;: weight loss educational material (shared in after visit summary)              Assessment and Plan    Diagnoses and all orders for this visit:    1. Depression (Primary)  Assessment & Plan:  Discussed poor control over anxiety/depression. Discussed SSRI, length of time to see improvement, and ADR (including increased risk for SI/HI, sexual dysfunction). Will start SSRI 1/2 tab daily x 1wk then increase to 1 tab daily. Will take 1 month to see max effect. To ER if SI/HIi.        2. Anxiety  Assessment & Plan:  Discussed acute anxiety symptoms. We will start buspar as well to help with acute symptoms. Discussed that it can " be taken up to 3 times per day and used as needed. Due to severe anxiety symptoms, I would recommend using once daily until acute symptoms improve, then moving to as needed basis. Discussed possible side effects of dizziness, confusion, headache. Patient understands and agrees.        Other orders  -     escitalopram (Lexapro) 10 MG tablet; Take 1 tablet by mouth Daily.  Dispense: 30 tablet; Refill: 1  -     busPIRone (BUSPAR) 5 MG tablet; Take 1 tablet by mouth 3 (Three) Times a Day As Needed (anxiety).  Dispense: 90 tablet; Refill: 0        Follow Up   Return in about 1 month (around 9/18/2023).  Patient was given instructions and counseling regarding her condition or for health maintenance advice. Please see specific information pulled into the AVS if appropriate.

## 2023-08-21 PROBLEM — Z34.80 SUPERVISION OF OTHER NORMAL PREGNANCY, ANTEPARTUM: Status: RESOLVED | Noted: 2022-04-13 | Resolved: 2023-08-21

## 2023-08-21 PROBLEM — F33.1 MAJOR DEPRESSIVE DISORDER, RECURRENT EPISODE, MODERATE DEGREE: Status: ACTIVE | Noted: 2023-08-21

## 2023-08-21 PROBLEM — Z34.90 ENCOUNTER FOR INDUCTION OF LABOR: Status: RESOLVED | Noted: 2022-10-31 | Resolved: 2023-08-21

## 2023-08-21 NOTE — PATIENT INSTRUCTIONS
Major Depressive Disorder, Adult  Major depressive disorder is a mental health condition. This disorder affects feelings. It can also affect the body. Symptoms of this condition last most of the day, almost every day, for 2 weeks. This disorder can affect:  Relationships.  Daily activities, such as work and school.  Activities that you normally like to do.  What are the causes?  The cause of this condition is not known. The disorder is likely caused by a mix of things, including:  Your personality, such as being a shy person.  Your behavior, or how you act toward others.  Your thoughts and feelings.  Too much alcohol or drugs.  How you react to stress.  Health and mental problems that you have had for a long time.  Things that hurt you in the past (trauma).  Big changes in your life, such as divorce.  What increases the risk?  The following factors may make you more likely to develop this condition:  Having family members with depression.  Being a woman.  Problems in the family.  Low levels of some brain chemicals.  Things that caused you pain as a child, especially if you lost a parent or were abused.  A lot of stress in your life, such as from:  Living without basic needs of life, such as food and shelter.  Being treated poorly because of race, sex, or Oriental orthodox (discrimination).  Health and mental problems that you have had for a long time.  What are the signs or symptoms?  The main symptoms of this condition are:  Being sad all the time.  Being grouchy all the time.  Loss of interest in things and activities.  Other symptoms include:  Sleeping too much or too little.  Eating too much or too little.  Gaining or losing weight, without knowing why.  Feeling tired or having low energy.  Being restless and weak.  Feeling hopeless, worthless, or guilty.  Trouble thinking clearly or making decisions.  Thoughts of hurting yourself or others, or thoughts of ending your life.  Spending a lot of time alone.  Inability to  complete common tasks of daily life.  If you have very bad MDD, you may:  Believe things that are not true.  Hear, see, taste, or feel things that are not there.  Have mild depression that lasts for at least 2 years.  Feel very sad and hopeless.  Have trouble speaking or moving.  How is this treated?  This condition may be treated with:  Talk therapy. This teaches you to know bad thoughts, feelings, and actions and how to change them.  This can also help you to communicate with others.  This can be done with members of your family.  Medicines. These can be used to treat worry (anxiety), depression, or low levels of chemicals in the brain.  Lifestyle changes. You may need to:  Limit alcohol use.  Limit drug use.  Get regular exercise.  Get plenty of sleep.  Make healthy eating choices.  Spend more time outdoors.  Brain stimulation. This treatment excites the brain. This is done when symptoms are very bad or have not gotten better with other treatments.  Follow these instructions at home:  Activity  Get regular exercise as told.  Spend time outdoors as told.  Make time to do the things you enjoy.  Find ways to deal with stress. Try to:  Meditate.  Do deep breathing.  Spend time in nature.  Keep a journal.  Return to your normal activities as told by your doctor. Ask your doctor what activities are safe for you.  Alcohol and drug use  If you drink alcohol:  Limit how much you use to:  0-1 drink a day for women.  0-2 drinks a day for men.  Be aware of how much alcohol is in your drink. In the [place] one drink equals one 12 oz bottle of beer (355 mL), one 5 oz glass of wine (148 mL), or one 1« oz glass of hard liquor (44 mL).  Talk to your doctor about:  Alcohol use. Alcohol can affect some medicines.  Any drug use.  General instructions    Take over-the-counter and prescription medicines and herbal preparations only as told by your doctor.  Eat a healthy diet.  Get a lot of sleep.  Think about joining a support group.  Your doctor may be able to suggest one.  Keep all follow-up visits as told by your doctor. This is important.  Where to find more information:  National [place] on Mental Illness: www.hanna.org  [place] National [place] of Mental Health: www.nimh.nih.gov  American Psychiatric Association: www.psychiatry.org/patients-families/  Contact a doctor if:  Your symptoms get worse.  You get new symptoms.  Get help right away if:  You hurt yourself.  You have serious thoughts about hurting yourself or others.  You see, hear, taste, smell, or feel things that are not there.  If you ever feel like you may hurt yourself or others, or have thoughts about taking your own life, get help right away. Go to your nearest emergency department or:  Call your local emergency services (671 in the [place]  Call a suicide crisis helpline, such as the National Suicide Prevention Lifeline at 1-549.302.5691 or 600 in the [place] This is open 24 hours a day in the [place]  Text the Crisis Text Line at 863675 (in the [place]  Summary  Major depressive disorder is a mental health condition. This disorder affects feelings. Symptoms of this condition last most of the day, almost every day, for 2 weeks.  The symptoms of this disorder can cause problems with relationships and with daily activities.  There are treatments and support for people who get this disorder. You may need more than one type of treatment.  Get help right away if you have serious thoughts about hurting yourself or others.  This information is not intended to replace advice given to you by your health care provider. Make sure you discuss any questions you have with your health care provider.  Document Revised: 07/13/2022 Document Reviewed: 11/28/2020  Elsevier Patient Education c 2023 [employer]

## 2023-08-21 NOTE — ASSESSMENT & PLAN NOTE
Discussed acute anxiety symptoms. We will start buspar as well to help with acute symptoms. Discussed that it can be taken up to 3 times per day and used as needed. Due to severe anxiety symptoms, I would recommend using once daily until acute symptoms improve, then moving to as needed basis. Discussed possible side effects of dizziness, confusion, headache. Patient understands and agrees.

## 2023-08-21 NOTE — ASSESSMENT & PLAN NOTE
Discussed poor control over anxiety/depression. Discussed SSRI, length of time to see improvement, and ADR (including increased risk for SI/HI, sexual dysfunction). Will start SSRI 1/2 tab daily x 1wk then increase to 1 tab daily. Will take 1 month to see max effect. To ER if SI/HIi.

## 2023-09-29 ENCOUNTER — TELEMEDICINE (OUTPATIENT)
Dept: INTERNAL MEDICINE | Facility: CLINIC | Age: 28
End: 2023-09-29
Payer: COMMERCIAL

## 2023-09-29 DIAGNOSIS — F33.1 MAJOR DEPRESSIVE DISORDER, RECURRENT EPISODE, MODERATE DEGREE: Primary | ICD-10-CM

## 2023-09-29 DIAGNOSIS — F41.1 GENERALIZED ANXIETY DISORDER: ICD-10-CM

## 2023-09-29 PROCEDURE — 1159F MED LIST DOCD IN RCRD: CPT | Performed by: PHYSICIAN ASSISTANT

## 2023-09-29 PROCEDURE — 99213 OFFICE O/P EST LOW 20 MIN: CPT | Performed by: PHYSICIAN ASSISTANT

## 2023-09-29 PROCEDURE — 1160F RVW MEDS BY RX/DR IN RCRD: CPT | Performed by: PHYSICIAN ASSISTANT

## 2023-09-29 RX ORDER — HYDROXYZINE PAMOATE 25 MG/1
100 CAPSULE ORAL DAILY PRN
Qty: 60 CAPSULE | Refills: 1 | Status: SHIPPED | OUTPATIENT
Start: 2023-09-29

## 2023-09-29 RX ORDER — CITALOPRAM 20 MG/1
20 TABLET ORAL DAILY
Qty: 30 TABLET | Refills: 1 | Status: SHIPPED | OUTPATIENT
Start: 2023-09-29

## 2023-09-29 NOTE — ASSESSMENT & PLAN NOTE
Mood showed no improvement with zoloft. Will change to celexa. Encouraged to take x 4 wks to give max effects. Return to clinic 1 month to follow up.

## 2023-09-29 NOTE — PATIENT INSTRUCTIONS
Major Depressive Disorder, Adult  Major depressive disorder is a mental health condition. This disorder affects feelings. It can also affect the body. Symptoms of this condition last most of the day, almost every day, for 2 weeks. This disorder can affect:  Relationships.  Daily activities, such as work and school.  Activities that you normally like to do.  What are the causes?  The cause of this condition is not known. The disorder is likely caused by a mix of things, including:  Your personality, such as being a shy person.  Your behavior, or how you act toward others.  Your thoughts and feelings.  Too much alcohol or drugs.  How you react to stress.  Health and mental problems that you have had for a long time.  Things that hurt you in the past (trauma).  Big changes in your life, such as divorce.  What increases the risk?  The following factors may make you more likely to develop this condition:  Having family members with depression.  Being a woman.  Problems in the family.  Low levels of some brain chemicals.  Things that caused you pain as a child, especially if you lost a parent or were abused.  A lot of stress in your life, such as from:  Living without basic needs of life, such as food and shelter.  Being treated poorly because of race, sex, or Judaism (discrimination).  Health and mental problems that you have had for a long time.  What are the signs or symptoms?  The main symptoms of this condition are:  Being sad all the time.  Being grouchy all the time.  Loss of interest in things and activities.  Other symptoms include:  Sleeping too much or too little.  Eating too much or too little.  Gaining or losing weight, without knowing why.  Feeling tired or having low energy.  Being restless and weak.  Feeling hopeless, worthless, or guilty.  Trouble thinking clearly or making decisions.  Thoughts of hurting yourself or others, or thoughts of ending your life.  Spending a lot of time alone.  Inability to  complete common tasks of daily life.  If you have very bad MDD, you may:  Believe things that are not true.  Hear, see, taste, or feel things that are not there.  Have mild depression that lasts for at least 2 years.  Feel very sad and hopeless.  Have trouble speaking or moving.  How is this treated?  This condition may be treated with:  Talk therapy. This teaches you to know bad thoughts, feelings, and actions and how to change them.  This can also help you to communicate with others.  This can be done with members of your family.  Medicines. These can be used to treat worry (anxiety), depression, or low levels of chemicals in the brain.  Lifestyle changes. You may need to:  Limit alcohol use.  Limit drug use.  Get regular exercise.  Get plenty of sleep.  Make healthy eating choices.  Spend more time outdoors.  Brain stimulation. This treatment excites the brain. This is done when symptoms are very bad or have not gotten better with other treatments.  Follow these instructions at home:  Activity  Get regular exercise as told.  Spend time outdoors as told.  Make time to do the things you enjoy.  Find ways to deal with stress. Try to:  Meditate.  Do deep breathing.  Spend time in nature.  Keep a journal.  Return to your normal activities as told by your doctor. Ask your doctor what activities are safe for you.  Alcohol and drug use  If you drink alcohol:  Limit how much you use to:  0-1 drink a day for women.  0-2 drinks a day for men.  Be aware of how much alcohol is in your drink. In the [place] one drink equals one 12 oz bottle of beer (355 mL), one 5 oz glass of wine (148 mL), or one 1½ oz glass of hard liquor (44 mL).  Talk to your doctor about:  Alcohol use. Alcohol can affect some medicines.  Any drug use.  General instructions    Take over-the-counter and prescription medicines and herbal preparations only as told by your doctor.  Eat a healthy diet.  Get a lot of sleep.  Think about joining a support group.  Your doctor may be able to suggest one.  Keep all follow-up visits as told by your doctor. This is important.  Where to find more information:  National [place] on Mental Illness: www.hanna.org  [place] National [place] of Mental Health: www.nimh.nih.gov  American Psychiatric Association: www.psychiatry.org/patients-families/  Contact a doctor if:  Your symptoms get worse.  You get new symptoms.  Get help right away if:  You hurt yourself.  You have serious thoughts about hurting yourself or others.  You see, hear, taste, smell, or feel things that are not there.  If you ever feel like you may hurt yourself or others, or have thoughts about taking your own life, get help right away. Go to your nearest emergency department or:  Call your local emergency services (349 in the [place]  Call a suicide crisis helpline, such as the National Suicide Prevention Lifeline at 1-520.800.2400 or 242 in the [place] This is open 24 hours a day in the [place]  Text the Crisis Text Line at 439862 (in the [place]  Summary  Major depressive disorder is a mental health condition. This disorder affects feelings. Symptoms of this condition last most of the day, almost every day, for 2 weeks.  The symptoms of this disorder can cause problems with relationships and with daily activities.  There are treatments and support for people who get this disorder. You may need more than one type of treatment.  Get help right away if you have serious thoughts about hurting yourself or others.  This information is not intended to replace advice given to you by your health care provider. Make sure you discuss any questions you have with your health care provider.  Document Revised: 07/13/2022 Document Reviewed: 11/28/2020  Elsevier Patient Education © 2023 Else[employer] Inc.

## 2023-09-29 NOTE — PROGRESS NOTES
"Chief Complaint  Anxiety and Depression    Subjective          Raysa Ellis presents to [facility]  History of Present Illness  Mode of Visit: Video using [employer] , video and audio was used.  Location of patient: Home  Location of provider: [facility] Office in [place], [place]  You have chosen to receive care through a telehealth visit.    Patient authorizes the electronic transmission of medical information and/or videoconference session. Patient understands that he/she can still pursue face-to-face consultation if desired. Patient understands that as with any technology, telemedicine does have its limitations. There is no guarantee, therefore, that this telemedicine session will eliminate the need for patient to see a provider in person if the provider feels a physical exams or vital signs are neccessary to care for the patient. Patient understands and would like to proceed with telemedicine visit at this time.Patient agrees to participate in a telemedicine evaluation performed by Louise Yeager PA-C.    Pt states depression medicine makes her feel more down so she stopped taking Zoloft.   Buspar was making her feel more anxious.   Still feeling sense of anxiety daily.   Denies si/hi  Mood feels \"ajit\", not good but not bad.      Past Medical History:   Diagnosis Date    Asymptomatic varicose veins of both lower extremities 10/7/2021    Current severe episode of major depressive disorder without psychotic features without prior episode 2/10/2022    Delivery by emergency  10/08/2019    Ectopic pregnancy     Generalized anxiety disorder 2/10/2022    Migraine headache 10/29/2021    S/P excision of ganglion cyst         Past Surgical History:   Procedure Laterality Date     SECTION  2019    ENDOVENOUS ABLATION SAPHENOUS VEIN W/ LASER  2023    SALPINGECTOMY Left 2020    WRIST GANGLION EXCISION  2018        Current Outpatient " Medications on File Prior to Visit   Medication Sig Dispense Refill    [DISCONTINUED] busPIRone (BUSPAR) 5 MG tablet Take 1 tablet by mouth 3 (Three) Times a Day As Needed (anxiety). 90 tablet 0    [DISCONTINUED] escitalopram (Lexapro) 10 MG tablet Take 1 tablet by mouth Daily. 30 tablet 1     No current facility-administered medications on file prior to visit.        No Known Allergies    Social History     Tobacco Use   Smoking Status Former    Packs/day: 0.50    Years: 5.00    Pack years: 2.50    Types: Cigarettes    Quit date:     Years since quittin.7   Smokeless Tobacco Never          Objective   Vital Signs:   There were no vitals taken for this visit.    Physical Exam  Constitutional:       Appearance: Normal appearance.   HENT:      Head: Normocephalic.   Pulmonary:      Effort: Pulmonary effort is normal.   Neurological:      Mental Status: She is alert and oriented to person, place, and time.   Psychiatric:         Mood and Affect: Mood normal.      Result Review :                            Assessment and Plan    Diagnoses and all orders for this visit:    1. Depression (Primary)  Assessment & Plan:  Mood showed no improvement with zoloft. Will change to celexa. Encouraged to take x 4 wks to give max effects. Return to clinic 1 month to follow up.       2. Generalized anxiety disorder  Assessment & Plan:  Buspar made sx worse per pt. Will try atarax prn. Cont new ssri as well.      Other orders  -     citalopram (CeleXA) 20 MG tablet; Take 1 tablet by mouth Daily.  Dispense: 30 tablet; Refill: 1  -     hydrOXYzine pamoate (VISTARIL) 25 MG capsule; Take 4 capsules by mouth Daily As Needed for Anxiety.  Dispense: 60 capsule; Refill: 1        Follow Up   Return in about 1 month (around 10/29/2023).  Patient was given instructions and counseling regarding her condition or for health maintenance advice. Please see specific information pulled into the AVS if appropriate.

## 2023-10-05 DIAGNOSIS — Z30.42 SURVEILLANCE FOR DEPO-PROVERA CONTRACEPTION: ICD-10-CM

## 2023-10-05 RX ORDER — MEDROXYPROGESTERONE ACETATE 150 MG/ML
150 INJECTION, SUSPENSION INTRAMUSCULAR
Status: SHIPPED | OUTPATIENT
Start: 2023-10-05

## 2023-10-25 ENCOUNTER — CLINICAL SUPPORT (OUTPATIENT)
Dept: OBSTETRICS AND GYNECOLOGY | Facility: CLINIC | Age: 28
End: 2023-10-25
Payer: COMMERCIAL

## 2023-10-25 DIAGNOSIS — Z30.42 ENCOUNTER FOR DEPO-PROVERA CONTRACEPTION: Primary | ICD-10-CM

## 2023-10-25 RX ADMIN — MEDROXYPROGESTERONE ACETATE 150 MG: 150 INJECTION, SUSPENSION INTRAMUSCULAR at 13:28

## 2023-10-25 NOTE — PROGRESS NOTES
Patient received Depo today  Administered in Left  Ventrogluteal  Next injection due between: January 10-January 24 2024  Beta HCG: was not done  Urine HCG:was not done  [place] in the last two weeks(NA if pt has been receiving depo):not applicable  Last yearly exam/Last Pap Smear: 02/17/2023   Pt supplied.  Pt tolerated well.   SG

## 2023-10-30 ENCOUNTER — TELEMEDICINE (OUTPATIENT)
Dept: INTERNAL MEDICINE | Facility: CLINIC | Age: 28
End: 2023-10-30
Payer: COMMERCIAL

## 2023-10-30 DIAGNOSIS — F41.1 GENERALIZED ANXIETY DISORDER: ICD-10-CM

## 2023-10-30 DIAGNOSIS — F33.1 MAJOR DEPRESSIVE DISORDER, RECURRENT EPISODE, MODERATE DEGREE: Primary | ICD-10-CM

## 2023-10-30 DIAGNOSIS — F43.21 GRIEF AT LOSS OF CHILD: ICD-10-CM

## 2023-10-30 DIAGNOSIS — Z63.4 GRIEF AT LOSS OF CHILD: ICD-10-CM

## 2023-10-30 PROCEDURE — 99213 OFFICE O/P EST LOW 20 MIN: CPT | Performed by: PHYSICIAN ASSISTANT

## 2023-10-30 SDOH — SOCIAL STABILITY - SOCIAL INSECURITY: DISSAPEARANCE AND DEATH OF FAMILY MEMBER: Z63.4

## 2023-10-30 NOTE — PROGRESS NOTES
Chief Complaint  Follow up depression    Subjective          Raysa Ellis presents to [facility]  History of Present Illness  Mode of Visit: Video using Provent , video and audio was used.  Location of patient: at her home  Location of provider: [facility] in [place], [place]  You have chosen to receive care through a telehealth visit.    Patient authorizes the electronic transmission of medical information and/or videoconference session. Patient understands that he/she can still pursue face-to-face consultation if desired. Patient understands that as with any technology, telemedicine does have its limitations. There is no guarantee, therefore, that this telemedicine session will eliminate the need for patient to see a provider in person if the provider feels a physical exams or vital signs are neccessary to care for the patient. Patient understands and would like to proceed with telemedicine visit at this time.Patient agrees to participate in a telemedicine evaluation performed by Louise Yeager PA-C.    Pt here for follow up on depression   Cannot tell any improvement in mood with Celexa.   Recently it was the birthday of her son who passed away last year  She is still having a lot of anxiety  denies si/hi  She is open to counseling at this time  Past Medical History:   Diagnosis Date    Asymptomatic varicose veins of both lower extremities 10/7/2021    Current severe episode of major depressive disorder without psychotic features without prior episode 2/10/2022    Delivery by emergency  10/08/2019    Ectopic pregnancy     Generalized anxiety disorder 2/10/2022    Migraine headache 10/29/2021    S/P excision of ganglion cyst         Past Surgical History:   Procedure Laterality Date     SECTION  2019    ENDOVENOUS ABLATION SAPHENOUS VEIN W/ LASER  2023    SALPINGECTOMY Left 2020    WRIST GANGLION EXCISION  2018         Current Outpatient Medications on File Prior to Visit   Medication Sig Dispense Refill    hydrOXYzine pamoate (VISTARIL) 25 MG capsule Take 4 capsules by mouth Daily As Needed for Anxiety. 60 capsule 1     Current Facility-Administered Medications on File Prior to Visit   Medication Dose Route Frequency Provider Last Rate Last Admin    medroxyPROGESTERone (DEPO-PROVERA) injection 150 mg  150 mg Intramuscular Q3 Months Juliana Ahmadi,    150 mg at 10/25/23 1328        No Known Allergies    Social History     Tobacco Use   Smoking Status Former    Packs/day: 0.50    Years: 5.00    Additional pack years: 0.00    Total pack years: 2.50    Types: Cigarettes    Quit date:     Years since quittin.8   Smokeless Tobacco Never          Objective   Vital Signs:   There were no vitals taken for this visit.    Physical Exam  Constitutional:       Appearance: Normal appearance.   HENT:      Head: Normocephalic.   Pulmonary:      Effort: Pulmonary effort is normal.   Neurological:      Mental Status: She is alert and oriented to person, place, and time.   Psychiatric:         Mood and Affect: Mood normal.        Result Review :                            Assessment and Plan    Diagnoses and all orders for this visit:    1. Depression (Primary)  -     Ambulatory Referral to Psychiatry    2. Generalized anxiety disorder  -     Ambulatory Referral to Psychiatry    3. Grief at loss of child  -     Ambulatory Referral to Psychiatry    Other orders  -     Vortioxetine HBr (Trintellix) 10 MG tablet tablet; Take 1 tablet by mouth Daily With Breakfast.  Dispense: 30 tablet; Refill: 2    Mood not controlled. Will change Celexa to Trintillix and refer to benja to help with med mgmt and counseling. To er if s/hi.   Pt understands and agrees with plan.      Follow Up   Return in about 6 weeks (around 2023).  Patient was given instructions and counseling regarding her condition or for health maintenance advice. Please see  specific information pulled into the AVS if appropriate.

## 2023-11-02 ENCOUNTER — OFFICE VISIT (OUTPATIENT)
Dept: ORTHOPEDIC SURGERY | Facility: CLINIC | Age: 28
End: 2023-11-02
Payer: COMMERCIAL

## 2023-11-02 ENCOUNTER — PREP FOR SURGERY (OUTPATIENT)
Dept: OTHER | Facility: HOSPITAL | Age: 28
End: 2023-11-02
Payer: COMMERCIAL

## 2023-11-02 VITALS — WEIGHT: 147 LBS | HEIGHT: 64 IN | BODY MASS INDEX: 25.1 KG/M2

## 2023-11-02 DIAGNOSIS — M67.432 GANGLION OF LEFT WRIST: Primary | ICD-10-CM

## 2023-11-02 RX ORDER — CEFAZOLIN SODIUM 2 G/100ML
2 INJECTION, SOLUTION INTRAVENOUS ONCE
OUTPATIENT
Start: 2023-11-02 | End: 2023-11-02

## 2023-11-02 RX ORDER — CEFAZOLIN SODIUM IN 0.9 % NACL 3 G/100 ML
3 INTRAVENOUS SOLUTION, PIGGYBACK (ML) INTRAVENOUS ONCE
OUTPATIENT
Start: 2023-11-02 | End: 2023-11-02

## 2023-11-02 NOTE — PROGRESS NOTES
"Chief Complaint  Follow-up of the Left Hand     Subjective      Raysa Ellis presents to [facility] for follow up of the left hand. She has a cyst on her wrist.  She has tried the bracing.  She notes the baby is finally walking the the wrist is hurting her more and she wants to discuss surgical options.     No Known Allergies     Social History     Socioeconomic History    Marital status: Single    Number of children: 1    Years of education: 11    Highest education level: 11th grade   Tobacco Use    Smoking status: Former     Packs/day: 0.50     Years: 5.00     Additional pack years: 0.00     Total pack years: 2.50     Types: Cigarettes     Quit date:      Years since quittin.8    Smokeless tobacco: Never   Vaping Use    Vaping Use: Never used   Substance and Sexual Activity    Alcohol use: No    Drug use: No    Sexual activity: Yes     Partners: Male     Birth control/protection: Condom, Depo-provera        I reviewed the patient's chief complaint, history of present illness, review of systems, past medical history, surgical history, family history, social history, medications, and allergy list.     Review of Systems     Constitutional: Denies fevers, chills, weight loss  Cardiovascular: Denies chest pain, shortness of breath  Skin: Denies rashes, acute skin changes  Neurologic: Denies headache, loss of consciousness        Vital Signs:   Ht 162.6 cm (64\")   Wt 66.7 kg (147 lb)   BMI 25.23 kg/m²          Physical Exam  General: Alert. No acute distress    Ortho Exam        LEFT HAND  Full ROM of the hand, fingers, elbow and wrist.  Sensation grossly intact to light touch, median, radial and ulnar nerve. Positive AIN, PIN and ulnar nerve motor function intact. Axillary nerve intact. Positive pulses. Ganglion cyst.         Procedures      Imaging Results (Most Recent)       None             Result Review :             Assessment and Plan     Diagnoses and all orders for this " visit:    1. Ganglion of left wrist (Primary)        Discussed the treatment plan with the patient.     Discussed the treatment options with the patient, operative vs non-operative.     The patient expressed understanding and wished to proceed with a left ganglion wrist excision.       Discussed surgery., Risks/benefits discussed with patient including, but not limited to: infection, bleeding, neurovascular damage, malunion, nonunion, aesthetic deformity, need for further surgery, and death., Surgery pamphlet given., and Call or return if worsening symptoms.    Follow Up     2 weeks postoperatively       Patient was given instructions and counseling regarding her condition or for health maintenance advice. Please see specific information pulled into the AVS if appropriate.     Scribed for Lester Hu MD by Marlene Hannah MA.  11/02/23   15:56 EDT    I have personally performed the services described in this document as scribed by the above individual and it is both accurate and complete. Lester Hu MD 11/03/23

## 2023-11-06 PROBLEM — M67.432 GANGLION OF LEFT WRIST: Status: ACTIVE | Noted: 2023-11-02

## 2023-11-07 ENCOUNTER — TELEPHONE (OUTPATIENT)
Dept: ORTHOPEDIC SURGERY | Facility: CLINIC | Age: 28
End: 2023-11-07
Payer: COMMERCIAL

## 2023-11-07 NOTE — TELEPHONE ENCOUNTER
I called and spoke with patient and explained to her that we will not prescribe pain medication prior to surgical procedure.  Patient voiced understanding.

## 2023-11-07 NOTE — TELEPHONE ENCOUNTER
PATIENT IS SCHEDULED FOR A LEFT WRIST GANGLION CYST EXCISION ON 12/13/2023. PATIENT CALLED IN STATING THAT SHE HAS BEEN HAVING PAIN IN THE WRIST ESPECIALLY AT NIGHT AND IS REQUESTING SOMETHING FOR PAIN TEMPORARILY UNTIL SHE CAN HAVE SURGERY.     PHARMACY: [facility]

## 2024-01-17 ENCOUNTER — TELEPHONE (OUTPATIENT)
Dept: OBSTETRICS AND GYNECOLOGY | Facility: CLINIC | Age: 29
End: 2024-01-17
Payer: COMMERCIAL

## 2024-01-17 DIAGNOSIS — Z30.42 ENCOUNTER FOR DEPO-PROVERA CONTRACEPTION: Primary | ICD-10-CM

## 2024-01-17 RX ORDER — MEDROXYPROGESTERONE ACETATE 150 MG/ML
150 INJECTION, SUSPENSION INTRAMUSCULAR
Qty: 1 EACH | Refills: 2 | Status: SHIPPED | OUTPATIENT
Start: 2024-01-17

## 2024-01-17 NOTE — TELEPHONE ENCOUNTER
Britney'd refill on Depo  thru 9/24.  Last seen 9/1/23.  Last shot 10/25/23 Window 1/10/ thru 1/24.  Shot scheduled for 1/23/24

## 2024-01-23 ENCOUNTER — CLINICAL SUPPORT (OUTPATIENT)
Dept: OBSTETRICS AND GYNECOLOGY | Facility: CLINIC | Age: 29
End: 2024-01-23
Payer: COMMERCIAL

## 2024-01-23 DIAGNOSIS — Z30.42 SURVEILLANCE OF CONTRACEPTIVE INJECTION: Primary | ICD-10-CM

## 2024-01-23 PROCEDURE — 96372 THER/PROPH/DIAG INJ SC/IM: CPT | Performed by: NURSE PRACTITIONER

## 2024-01-23 RX ADMIN — MEDROXYPROGESTERONE ACETATE 150 MG: 150 INJECTION, SUSPENSION INTRAMUSCULAR at 14:41

## 2024-01-23 NOTE — PROGRESS NOTES
Patient received Depo today  Administered in Right  Ventrogluteal  Next injection due between:April 10-May 8, 2024  Beta HCG: was not done, pt within window  Urine HCG:was not done  [place] in the last two weeks(NA if pt has been receiving depo):not applicable  Last yearly exam/Last Pap Smear: 2/17/23

## 2024-02-06 ENCOUNTER — TELEPHONE (OUTPATIENT)
Dept: ORTHOPEDIC SURGERY | Facility: CLINIC | Age: 29
End: 2024-02-06
Payer: COMMERCIAL

## 2024-02-06 NOTE — TELEPHONE ENCOUNTER
Caller: GABRIEL LOPEZ     Relationship to patient: PATIENT     Best call back number: [phone]    Chief complaint: LEFT WRIST     Type of visit: SX SCHEDULED FOR 02/07/24    Requested date: DID NOT WISH TO RESCHEDULE      If rescheduling, when is the original appointment: 02/07/2024     Additional notes:PATIENT STATES SHE HAS FAMILY OBLIGATIONS TO TAKE CARE OF AND DOES NOT WANT TO RESCHEDULE

## 2024-02-06 NOTE — TELEPHONE ENCOUNTER
PER RAMIRO IN [facility] PATIENT WISHES TO CANCEL SX FOR 2/7/2024. I ATTEMPTED TO CALL PATIENT MULTIPLE TIMES, NO ANSWER. LVM FOR PATIENT STATING SURGERY HAS BEEN CX'D AND IF SHE WISHES TO RESCHEDULE IN THE FUTURE SHE WILL NEED TO SEE DR. LAYNE FIRST BEFORE WE CAN RESCHEDULE.     SX FOR 2/7/2024 CX'D - SPOKE WITH  LEANDRA TO CX

## 2024-03-11 NOTE — PROGRESS NOTES
"GYN Visit    Chief Complaint   Patient presents with    Follow-up     Discuss BC       HPI:   28 y.o. LMP: No LMP recorded (lmp unknown). Patient has had an injection.     Social History     Substance and Sexual Activity   Sexual Activity Yes    Partners: Male    Birth control/protection: Condom, Depo-provera     No further child bearing desires.  Considering salpingectomy, had one removed previously.  Wants to discuss options.    Currently Depo- next due NLT 8May, wt gain.    Menses:   none.  Pre Depo, nl light menstrual cycles.   Pain:  None    History: PMHx, Meds, Allergies, PSHx, Social Hx, and POBHx all reviewed and updated.    PHYSICAL EXAM:  /83   Pulse 114   Ht 162.6 cm (64\")   Wt 70.2 kg (154 lb 12.8 oz)   LMP  (LMP Unknown) Comment: No periods w/ depo  BMI 26.57 kg/m²   Facility age limit for growth %dian is 20 years.   General- NAD, alert and oriented, appropriate  Psych- Normal mood, good memory    ASSESSMENT AND PLAN:  Diagnoses and all orders for this visit:    1. Birth control counseling (Primary)  Comments:  Considering nexplanon      All BIRTH CONTROL options R/B/A/SE/E of each reviewed in detail.  Pamphlets provided for all L[facility].  Declines tubal at this time, will consider later prn.    Follow Up:  Return in about 6 weeks (around 2024) for Nexplanon placement, [facility] day of OV, Depo good to 8May.          Chiquis Lincoln,   03/15/2024    [facility]  [address] [place] [address]  Dept: [phone]  Dept Fax: [phone]  Loc: [phone]  Loc Fax: [phone]     "

## 2024-03-15 ENCOUNTER — OFFICE VISIT (OUTPATIENT)
Dept: OBSTETRICS AND GYNECOLOGY | Facility: CLINIC | Age: 29
End: 2024-03-15
Payer: COMMERCIAL

## 2024-03-15 VITALS
HEIGHT: 64 IN | WEIGHT: 154.8 LBS | SYSTOLIC BLOOD PRESSURE: 120 MMHG | HEART RATE: 114 BPM | BODY MASS INDEX: 26.43 KG/M2 | DIASTOLIC BLOOD PRESSURE: 83 MMHG

## 2024-03-15 DIAGNOSIS — Z30.09 BIRTH CONTROL COUNSELING: Primary | ICD-10-CM

## 2024-03-15 RX ORDER — CETIRIZINE HYDROCHLORIDE 10 MG/1
1 TABLET ORAL DAILY
COMMUNITY
Start: 2024-01-29

## 2024-04-30 ENCOUNTER — LAB (OUTPATIENT)
Dept: OBSTETRICS AND GYNECOLOGY | Facility: CLINIC | Age: 29
End: 2024-04-30
Payer: COMMERCIAL

## 2024-04-30 DIAGNOSIS — Z01.812 PRE-PROCEDURAL LABORATORY EXAMINATION: Primary | ICD-10-CM

## 2024-04-30 LAB — HCG INTACT+B SERPL-ACNC: <1 MIU/ML

## 2024-04-30 PROCEDURE — 36415 COLL VENOUS BLD VENIPUNCTURE: CPT | Performed by: OBSTETRICS & GYNECOLOGY

## 2024-04-30 PROCEDURE — 84702 CHORIONIC GONADOTROPIN TEST: CPT | Performed by: OBSTETRICS & GYNECOLOGY

## 2024-05-01 ENCOUNTER — CLINICAL SUPPORT (OUTPATIENT)
Dept: OBSTETRICS AND GYNECOLOGY | Facility: CLINIC | Age: 29
End: 2024-05-01
Payer: COMMERCIAL

## 2024-05-01 DIAGNOSIS — Z30.42 ENCOUNTER FOR MANAGEMENT AND INJECTION OF DEPO-PROVERA: Primary | ICD-10-CM

## 2024-05-01 RX ORDER — MEDROXYPROGESTERONE ACETATE 150 MG/ML
150 INJECTION, SUSPENSION INTRAMUSCULAR ONCE
Status: COMPLETED | OUTPATIENT
Start: 2024-05-01 | End: 2024-05-01

## 2024-05-01 RX ADMIN — MEDROXYPROGESTERONE ACETATE 150 MG: 150 INJECTION, SUSPENSION INTRAMUSCULAR at 14:10

## 2024-05-01 NOTE — PROGRESS NOTES
Patient received Depo today    Administered by : KALIE   Administered Date: 05/01/2024  : PRASCO  Vaccine Name- Medroxyprogesterone   Amount administered: 150 mg, 1 ml  Information on label   NDC -3945-6445-04  LOT #[id number]  EXP: 03/01/2025    Next depo due: JULY 17TH - AUG 14TH 2024  Administered in Right Deltoid   Beta HCG: was done and was NEGATIVE   Urine HCG:was not done  [place] in the last two weeks(NA if pt has been receiving depo):no  Pap Due:  NO  Patient supplied Depo  How the injection was tolerated by the patient: Patient tolerated well.

## 2024-05-08 ENCOUNTER — HOSPITAL ENCOUNTER (EMERGENCY)
Facility: HOSPITAL | Age: 29
Discharge: HOME | End: 2024-05-09
Attending: EMERGENCY MEDICINE
Payer: COMMERCIAL

## 2024-05-08 ENCOUNTER — APPOINTMENT (OUTPATIENT)
Dept: GENERAL RADIOLOGY | Facility: HOSPITAL | Age: 29
End: 2024-05-08
Payer: COMMERCIAL

## 2024-05-08 ENCOUNTER — TELEPHONE (OUTPATIENT)
Dept: INTERNAL MEDICINE | Facility: CLINIC | Age: 29
End: 2024-05-08
Payer: COMMERCIAL

## 2024-05-08 DIAGNOSIS — R00.0 SINUS TACHYCARDIA: ICD-10-CM

## 2024-05-08 DIAGNOSIS — R00.2 PALPITATIONS: Primary | ICD-10-CM

## 2024-05-08 LAB
ALBUMIN SERPL-MCNC: 4.6 G/DL (ref 3.5–5.2)
ALBUMIN/GLOB SERPL: 1.8 G/DL
ALP SERPL-CCNC: 89 U/L (ref 39–117)
ALT SERPL W P-5'-P-CCNC: 19 U/L (ref 1–33)
AMPHET+METHAMPHET UR QL: NEGATIVE
ANION GAP SERPL CALCULATED.3IONS-SCNC: 14.3 MMOL/L (ref 5–15)
AST SERPL-CCNC: 16 U/L (ref 1–32)
BARBITURATES UR QL SCN: NEGATIVE
BASOPHILS # BLD AUTO: 0.04 10*3/MM3 (ref 0–0.2)
BASOPHILS NFR BLD AUTO: 0.4 % (ref 0–1.5)
BENZODIAZ UR QL SCN: NEGATIVE
BILIRUB SERPL-MCNC: 0.5 MG/DL (ref 0–1.2)
BUN SERPL-MCNC: 10 MG/DL (ref 6–20)
BUN/CREAT SERPL: 15.2 (ref 7–25)
CALCIUM SPEC-SCNC: 9.5 MG/DL (ref 8.6–10.5)
CANNABINOIDS SERPL QL: POSITIVE
CHLORIDE SERPL-SCNC: 107 MMOL/L (ref 98–107)
CO2 SERPL-SCNC: 18.7 MMOL/L (ref 22–29)
COCAINE UR QL: NEGATIVE
CREAT SERPL-MCNC: 0.66 MG/DL (ref 0.57–1)
D DIMER PPP FEU-MCNC: <0.27 MCGFEU/ML (ref 0–0.5)
DEPRECATED RDW RBC AUTO: 37.6 FL (ref 37–54)
EGFRCR SERPLBLD CKD-EPI 2021: 122 ML/MIN/1.73
EOSINOPHIL # BLD AUTO: 0.05 10*3/MM3 (ref 0–0.4)
EOSINOPHIL NFR BLD AUTO: 0.5 % (ref 0.3–6.2)
ERYTHROCYTE [DISTWIDTH] IN BLOOD BY AUTOMATED COUNT: 12.4 % (ref 12.3–15.4)
FENTANYL UR-MCNC: NEGATIVE NG/ML
GLOBULIN UR ELPH-MCNC: 2.5 GM/DL
GLUCOSE SERPL-MCNC: 114 MG/DL (ref 65–99)
HCG INTACT+B SERPL-ACNC: <0.5 MIU/ML
HCT VFR BLD AUTO: 43 % (ref 34–46.6)
HGB BLD-MCNC: 15 G/DL (ref 12–15.9)
HOLD SPECIMEN: NORMAL
HOLD SPECIMEN: NORMAL
IMM GRANULOCYTES # BLD AUTO: 0.03 10*3/MM3 (ref 0–0.05)
IMM GRANULOCYTES NFR BLD AUTO: 0.3 % (ref 0–0.5)
LYMPHOCYTES # BLD AUTO: 2.29 10*3/MM3 (ref 0.7–3.1)
LYMPHOCYTES NFR BLD AUTO: 22.5 % (ref 19.6–45.3)
MAGNESIUM SERPL-MCNC: 1.9 MG/DL (ref 1.6–2.6)
MCH RBC QN AUTO: 29 PG (ref 26.6–33)
MCHC RBC AUTO-ENTMCNC: 34.9 G/DL (ref 31.5–35.7)
MCV RBC AUTO: 83 FL (ref 79–97)
METHADONE UR QL SCN: NEGATIVE
MONOCYTES # BLD AUTO: 0.51 10*3/MM3 (ref 0.1–0.9)
MONOCYTES NFR BLD AUTO: 5 % (ref 5–12)
NEUTROPHILS NFR BLD AUTO: 7.27 10*3/MM3 (ref 1.7–7)
NEUTROPHILS NFR BLD AUTO: 71.3 % (ref 42.7–76)
NRBC BLD AUTO-RTO: 0 /100 WBC (ref 0–0.2)
OPIATES UR QL: NEGATIVE
OXYCODONE UR QL SCN: NEGATIVE
PLATELET # BLD AUTO: 202 10*3/MM3 (ref 140–450)
PMV BLD AUTO: 10.2 FL (ref 6–12)
POTASSIUM SERPL-SCNC: 3.2 MMOL/L (ref 3.5–5.2)
PROT SERPL-MCNC: 7.1 G/DL (ref 6–8.5)
QT INTERVAL: 324 MS
QTC INTERVAL: 436 MS
RBC # BLD AUTO: 5.18 10*6/MM3 (ref 3.77–5.28)
SODIUM SERPL-SCNC: 140 MMOL/L (ref 136–145)
T4 FREE SERPL-MCNC: 1.4 NG/DL (ref 0.92–1.68)
TROPONIN T SERPL HS-MCNC: <6 NG/L
TSH SERPL DL<=0.05 MIU/L-ACNC: 0.93 UIU/ML (ref 0.27–4.2)
WBC NRBC COR # BLD AUTO: 10.19 10*3/MM3 (ref 3.4–10.8)
WHOLE BLOOD HOLD COAG: NORMAL
WHOLE BLOOD HOLD SPECIMEN: NORMAL

## 2024-05-08 PROCEDURE — 80307 DRUG TEST PRSMV CHEM ANLYZR: CPT | Performed by: EMERGENCY MEDICINE

## 2024-05-08 PROCEDURE — 25010000002 ONDANSETRON PER 1 MG: Performed by: EMERGENCY MEDICINE

## 2024-05-08 PROCEDURE — 93005 ELECTROCARDIOGRAM TRACING: CPT

## 2024-05-08 PROCEDURE — 84439 ASSAY OF FREE THYROXINE: CPT | Performed by: EMERGENCY MEDICINE

## 2024-05-08 PROCEDURE — 84443 ASSAY THYROID STIM HORMONE: CPT

## 2024-05-08 PROCEDURE — 83735 ASSAY OF MAGNESIUM: CPT

## 2024-05-08 PROCEDURE — 93005 ELECTROCARDIOGRAM TRACING: CPT | Performed by: EMERGENCY MEDICINE

## 2024-05-08 PROCEDURE — 71045 X-RAY EXAM CHEST 1 VIEW: CPT

## 2024-05-08 PROCEDURE — 85025 COMPLETE CBC W/AUTO DIFF WBC: CPT

## 2024-05-08 PROCEDURE — 84484 ASSAY OF TROPONIN QUANT: CPT

## 2024-05-08 PROCEDURE — 99284 EMERGENCY DEPT VISIT MOD MDM: CPT

## 2024-05-08 PROCEDURE — 85379 FIBRIN DEGRADATION QUANT: CPT | Performed by: EMERGENCY MEDICINE

## 2024-05-08 PROCEDURE — 96374 THER/PROPH/DIAG INJ IV PUSH: CPT

## 2024-05-08 PROCEDURE — 84702 CHORIONIC GONADOTROPIN TEST: CPT | Performed by: EMERGENCY MEDICINE

## 2024-05-08 PROCEDURE — 80053 COMPREHEN METABOLIC PANEL: CPT

## 2024-05-08 RX ORDER — ONDANSETRON 2 MG/ML
4 INJECTION INTRAMUSCULAR; INTRAVENOUS ONCE
Status: COMPLETED | OUTPATIENT
Start: 2024-05-08 | End: 2024-05-08

## 2024-05-08 RX ORDER — ATENOLOL 25 MG/1
25 TABLET ORAL ONCE
Status: COMPLETED | OUTPATIENT
Start: 2024-05-08 | End: 2024-05-08

## 2024-05-08 RX ORDER — SODIUM CHLORIDE 0.9 % (FLUSH) 0.9 %
10 SYRINGE (ML) INJECTION AS NEEDED
Status: DISCONTINUED | OUTPATIENT
Start: 2024-05-08 | End: 2024-05-09 | Stop reason: HOSPADM

## 2024-05-08 RX ADMIN — ATENOLOL 25 MG: 25 TABLET ORAL at 22:49

## 2024-05-08 RX ADMIN — ONDANSETRON 4 MG: 2 INJECTION INTRAMUSCULAR; INTRAVENOUS at 22:44

## 2024-05-09 ENCOUNTER — OFFICE VISIT (OUTPATIENT)
Dept: INTERNAL MEDICINE | Facility: CLINIC | Age: 29
End: 2024-05-09
Payer: COMMERCIAL

## 2024-05-09 VITALS
BODY MASS INDEX: 26.12 KG/M2 | WEIGHT: 153 LBS | HEART RATE: 94 BPM | SYSTOLIC BLOOD PRESSURE: 108 MMHG | HEIGHT: 64 IN | RESPIRATION RATE: 16 BRPM | OXYGEN SATURATION: 98 % | DIASTOLIC BLOOD PRESSURE: 70 MMHG | TEMPERATURE: 98.9 F

## 2024-05-09 VITALS
RESPIRATION RATE: 18 BRPM | WEIGHT: 164.02 LBS | HEIGHT: 64 IN | OXYGEN SATURATION: 97 % | TEMPERATURE: 98 F | SYSTOLIC BLOOD PRESSURE: 110 MMHG | DIASTOLIC BLOOD PRESSURE: 72 MMHG | BODY MASS INDEX: 28 KG/M2 | HEART RATE: 64 BPM

## 2024-05-09 DIAGNOSIS — R00.0 SINUS TACHYCARDIA: Primary | ICD-10-CM

## 2024-05-09 DIAGNOSIS — E87.6 HYPOKALEMIA: ICD-10-CM

## 2024-05-09 DIAGNOSIS — R00.2 PALPITATIONS: ICD-10-CM

## 2024-05-09 DIAGNOSIS — F41.1 GENERALIZED ANXIETY DISORDER: ICD-10-CM

## 2024-05-09 LAB
ANION GAP SERPL CALCULATED.3IONS-SCNC: 12 MMOL/L (ref 5–15)
BUN SERPL-MCNC: 11 MG/DL (ref 6–20)
BUN/CREAT SERPL: 13.8 (ref 7–25)
CALCIUM SPEC-SCNC: 9.5 MG/DL (ref 8.6–10.5)
CHLORIDE SERPL-SCNC: 107 MMOL/L (ref 98–107)
CO2 SERPL-SCNC: 22 MMOL/L (ref 22–29)
CREAT SERPL-MCNC: 0.8 MG/DL (ref 0.57–1)
EGFRCR SERPLBLD CKD-EPI 2021: 102.4 ML/MIN/1.73
GLUCOSE SERPL-MCNC: 86 MG/DL (ref 65–99)
POTASSIUM SERPL-SCNC: 3.8 MMOL/L (ref 3.5–5.2)
SODIUM SERPL-SCNC: 141 MMOL/L (ref 136–145)

## 2024-05-09 PROCEDURE — 1160F RVW MEDS BY RX/DR IN RCRD: CPT | Performed by: PHYSICIAN ASSISTANT

## 2024-05-09 PROCEDURE — 99214 OFFICE O/P EST MOD 30 MIN: CPT | Performed by: PHYSICIAN ASSISTANT

## 2024-05-09 PROCEDURE — 80048 BASIC METABOLIC PNL TOTAL CA: CPT | Performed by: PHYSICIAN ASSISTANT

## 2024-05-09 PROCEDURE — 1159F MED LIST DOCD IN RCRD: CPT | Performed by: PHYSICIAN ASSISTANT

## 2024-05-09 RX ORDER — PROPRANOLOL HYDROCHLORIDE 10 MG/1
10 TABLET ORAL 2 TIMES DAILY
Qty: 60 TABLET | Refills: 0 | Status: SHIPPED | OUTPATIENT
Start: 2024-05-09

## 2024-05-09 NOTE — ED PROVIDER NOTES
Time: 10:04 PM EDT  Date of encounter:  2024  Independent Historian/Clinical History and Information was obtained by:   Patient  Chief Complaint: Rapid heart rate  History is limited by: N/A    History of Present Illness:  Patient is a 28 y.o. year old female who presents to the emergency department for evaluation of rapid heart rate.  Patient states that she had an acute onset of fast rapid heart rate today.  Patient also notes some associated shortness of breath.  Patient does note that she is under fair amount of stress.  Patient also notes that she takes at least 8 shots of espresso today plus Mountain Dew.  Patient denies any energy drinks or diet medication.  The patient denies any illicit substances.  Patient has no chest pain or chest pressure.  Patient's not recently been ill.  The patient has no vomiting or diarrhea.  The patient denies pregnancy.  Patient denies any abdominal pain or back pain.  The patient denies any pain or swelling in the legs.  Patient denies any history of DVT or pulm embolism.  The patient's had no recent immobilization, travel or surgery in the last 6 weeks.  Patient does note that she is on Depo for birth control.    HPI    Patient Care Team  Primary Care Provider: Louise Yeager PA-C    Past Medical History:     No Known Allergies  Past Medical History:   Diagnosis Date    Asymptomatic varicose veins of both lower extremities 10/07/2021    Current severe episode of major depressive disorder without psychotic features without prior episode 02/10/2022    Delivery by emergency  10/08/2019    Ectopic pregnancy     Generalized anxiety disorder 02/10/2022    Migraine headache 10/29/2021    Nevus, non-neoplastic 2022    Other specified soft tissue disorders 2022    S/P excision of ganglion cyst 2017    Spider veins     Varicose veins of unspecified lower extremity with pain 07/15/2020    Venous insufficiency (chronic) (peripheral) 07/15/2020     Past Surgical  History:   Procedure Laterality Date     SECTION  2019    ENDOVENOUS ABLATION SAPHENOUS VEIN W/ LASER  2023    SALPINGECTOMY Left 2020    WRIST GANGLION EXCISION  2018     Family History   Problem Relation Age of Onset    Asthma Mother     Sleep apnea Mother     Hypertension Maternal Grandmother     Breast cancer Neg Hx     Uterine cancer Neg Hx     Ovarian cancer Neg Hx     Colon cancer Neg Hx     Pulmonary embolism Neg Hx     Deep vein thrombosis Neg Hx        Home Medications:  Prior to Admission medications    Medication Sig Start Date End Date Taking? Authorizing Provider   medroxyPROGESTERone (DEPO-PROVERA) 150 MG/ML injection Inject 1 mL into the appropriate muscle as directed by prescriber Every 3 (Three) Months. 24   Jeanie Jeter APRN   Vortioxetine HBr (Trintellix) 10 MG tablet tablet Take 1 tablet by mouth Daily With Breakfast. 10/30/23   Louise Yeager PA-C   cetirizine (zyrTEC) 10 MG tablet Take 1 tablet by mouth Daily. 24  Provider, MD Cong   hydrOXYzine pamoate (VISTARIL) 25 MG capsule Take 4 capsules by mouth Daily As Needed for Anxiety. 23  Louise Yeager PA-C        Social History:   Social History     Tobacco Use    Smoking status: Former     Current packs/day: 0.00     Average packs/day: 0.5 packs/day for 5.0 years (2.5 ttl pk-yrs)     Types: Cigarettes     Start date:      Quit date: 2018     Years since quittin.3    Smokeless tobacco: Never   Vaping Use    Vaping status: Never Used   Substance Use Topics    Alcohol use: No    Drug use: No         Review of Systems:  Review of Systems   Constitutional:  Negative for chills, diaphoresis and fever.   HENT:  Negative for congestion, postnasal drip, rhinorrhea and sore throat.    Eyes:  Negative for photophobia.   Respiratory:  Positive for shortness of breath. Negative for cough and chest tightness.    Cardiovascular:  Positive for palpitations. Negative for chest pain and  "leg swelling.   Gastrointestinal:  Negative for abdominal pain, diarrhea, nausea and vomiting.   Genitourinary:  Negative for difficulty urinating, dysuria, flank pain, frequency, hematuria and urgency.   Musculoskeletal:  Negative for neck pain and neck stiffness.   Skin:  Negative for pallor and rash.   Neurological:  Positive for light-headedness. Negative for dizziness, syncope, weakness, numbness and headaches.   Hematological:  Negative for adenopathy. Does not bruise/bleed easily.   Psychiatric/Behavioral: Negative.          Physical Exam:  /72 (BP Location: Left arm, Patient Position: Sitting)   Pulse 64   Temp 98 °F (36.7 °C) (Oral)   Resp 18   Ht 162.6 cm (64\")   Wt 74.4 kg (164 lb 0.4 oz)   LMP  (LMP Unknown)   SpO2 97%   BMI 28.15 kg/m²     Physical Exam  Vitals and nursing note reviewed.   Constitutional:       General: She is not in acute distress.     Appearance: Normal appearance. She is not ill-appearing, toxic-appearing or diaphoretic.   HENT:      Head: Normocephalic and atraumatic.      Mouth/Throat:      Mouth: Mucous membranes are moist.   Eyes:      Pupils: Pupils are equal, round, and reactive to light.   Neck:      Thyroid: No thyroid mass, thyromegaly or thyroid tenderness.   Cardiovascular:      Rate and Rhythm: Regular rhythm. Tachycardia present.      Pulses: Normal pulses.           Carotid pulses are 2+ on the right side and 2+ on the left side.       Radial pulses are 2+ on the right side and 2+ on the left side.        Femoral pulses are 2+ on the right side and 2+ on the left side.       Popliteal pulses are 2+ on the right side and 2+ on the left side.        Dorsalis pedis pulses are 2+ on the right side and 2+ on the left side.        Posterior tibial pulses are 2+ on the right side and 2+ on the left side.      Heart sounds: Normal heart sounds. No murmur heard.  Pulmonary:      Effort: Pulmonary effort is normal. No accessory muscle usage, respiratory distress or " retractions.      Breath sounds: Normal breath sounds. No wheezing, rhonchi or rales.   Abdominal:      General: Abdomen is flat. There is no distension.      Palpations: Abdomen is soft. There is no mass or pulsatile mass.      Tenderness: There is no abdominal tenderness. There is no right CVA tenderness, left CVA tenderness, guarding or rebound.      Comments: No rigidity   Musculoskeletal:         General: No swelling, tenderness or deformity.      Cervical back: Neck supple. No tenderness.      Right lower leg: No edema.      Left lower leg: No edema.   Skin:     General: Skin is warm and dry.      Capillary Refill: Capillary refill takes less than 2 seconds.      Coloration: Skin is not jaundiced or pale.      Findings: No erythema.   Neurological:      General: No focal deficit present.      Mental Status: She is alert and oriented to person, place, and time. Mental status is at baseline.      Cranial Nerves: Cranial nerves 2-12 are intact. No cranial nerve deficit.      Sensory: Sensation is intact. No sensory deficit.      Motor: Motor function is intact. No weakness or pronator drift.      Coordination: Coordination is intact. Coordination normal.   Psychiatric:         Mood and Affect: Mood normal.         Behavior: Behavior normal.                  Procedures:  Procedures      Medical Decision Making:      Comorbidities that affect care:    Migraine, depression, varicose veins    External Notes reviewed:    None      The following orders were placed and all results were independently analyzed by me:  Orders Placed This Encounter   Procedures    XR Chest 1 View    Cantril Draw    Comprehensive Metabolic Panel    Magnesium    Single High Sensitivity Troponin T    TSH    CBC Auto Differential    T4, Free    D-dimer, Quantitative    Urine Drug Screen - Urine, Clean Catch    hCG, Quantitative, Pregnancy    Undress & Gown    Continuous Pulse Oximetry    ECG 12 Lead ED Triage Standing Order; Dysrhythmia    CBC  & Differential    Green Top (Gel)    Lavender Top    Gold Top - SST    Light Blue Top       Medications Given in the Emergency Department:  Medications   atenolol (TENORMIN) tablet 25 mg (25 mg Oral Given 5/8/24 2249)   ondansetron (ZOFRAN) injection 4 mg (4 mg Intravenous Given 5/8/24 2244)        ED Course:    ED Course as of 05/10/24 0234   Wed May 08, 2024   2051 EKG:    Rhythm: Sinus tachycardia  Rate: 108  Intervals: Normal AR and QT interval  T-wave: Isolated nonspecific T wave inversion III, nonspecific T wave flattening  ST Segment: Nonspecific ST segment in III and aVF, no obvious pathological ST elevation and reciprocal ST depression to suggest STEMI    EKG Comparison: Probably no change in the cures and ST morphology from the EKG was performed March 29, 2023    Interpreted by me   [SD]      ED Course User Index  [SD] Enrique Colon DO       Labs:    Lab Results (last 24 hours)       Procedure Component Value Units Date/Time    Basic metabolic panel [[id number]]  (Normal) Collected: 05/09/24 1116    Specimen: Blood from Arm, Right Updated: 05/09/24 1749     Glucose 86 mg/dL      BUN 11 mg/dL      Creatinine 0.80 mg/dL      Sodium 141 mmol/L      Potassium 3.8 mmol/L      Chloride 107 mmol/L      CO2 22.0 mmol/L      Calcium 9.5 mg/dL      BUN/Creatinine Ratio 13.8     Anion Gap 12.0 mmol/L      eGFR 102.4 mL/min/1.73     Narrative:      GFR Normal >60  Chronic Kidney Disease <60  Kidney Failure <15               Imaging:    No Radiology Exams Resulted Within Past 24 Hours      Differential Diagnosis and Discussion:    Palpitations: Differential diagnosis includes but is not limited to anxiety, atrioventricular blocks, mitral valve disease, hypoxia, coronary artery disease, hypokalemia, anemia, fever, COPD, congestive heart failure, pericarditis, Georgina-Parkinson-White syndrome, pulmonary embolism, SVT, atrial fibrillation, atrial flutter, sinus tachycardia, thyrotoxicosis, and pheochromocytoma.    All labs  "were reviewed and interpreted by me.  All X-rays impressions were independently interpreted by me.  EKG was interpreted by me.    MDM  Number of Diagnoses or Management Options  Palpitations  Sinus tachycardia  Diagnosis management comments: Patient's urine toxicology screen was positive for THC    Patient's pregnancy test was negative    The patient's TSH and free T4 were normal.  I do not feel that the patient had thyrotoxicosis or thyroid storm and thus not the cause of the patient's symptoms    The patient's CMP was reviewed and shows no abnormalities of critical concern.  Of note, the patient's sodium and potassium are acceptable.  The patient's liver enzymes are unremarkable.  The patient's renal function including creatinine is preserved.  The patient has a normal anion gap.    The patient's CBC was reviewed and shows no abnormalities of critical concern.  Of note, there is no anemia requiring a blood transfusion and the platelet count is acceptable    The patient's EKG demonstrated sinus tachycardia but no evidence of dysrhythmia or STEMI    The patient had a normal high-sensitivity troponin        Wells' Criteria for Pulmonary Embolism - MDCalc  Calculated on May 09 2024 12:09 AM  1.5 points -> Low risk group: 1.3% chance of PE in an ED population. Another study assigned scores = 4 as \"PE Unlikely\" and had a 3% incidence of PE.    The patient had a normal D-dimer    The patient had a low pretest clinical probability Wells score for pulmonary embolism.  The patient has a normal D-dimer.  The patient understands that they are low risk for pulmonary embolism.  I discussed performing a CAT scan of the chest to rule out pulmonary embolism with the patient.  Due to the fact that the patient is low risk for a pulmonary embolism and then discussing the risks of radiation exposure from the CAT scan,  the patient does not want to have a CAT scan performed at this time.      Patient vital signs are stable in the " emergency room.  The patient did have initial tachycardia upon presentation.  The patient was given 25 mg of atenolol.  The patient's heart rate remained under 100 after the atenolol.  The patient's blood pressure was stable.    The patient will be referred to cardiology for Holter monitor and echocardiogram.    The patient will stop all caffeine including coffee and soda.    I have spoken with this patient.  I have explained the patient's condition, diagnosis and treatment plan based on the information available to me at this time.  I have answered the patient's questions and addressed any concerns.  The patient has a good understanding of the patient's diagnosis condition and treatment plan as can be expected at this point.  The vital signs have been stable.  The patient's condition is stable and appropriate for discharge from the emergency department.     Patient will pursue further outpatient evaluation with the primary care physician or other designated or consulting physicians as outlined in the discharge instruction.  The patient is agreeable to this plan of care and follow-up instructions have been explained in detail.  The patient has received these instructions in written format and has expressed understanding of the discharge instructions.  The patient is aware that any significant change in condition or worsening of symptoms should prompt immediate return to this or the closest emergency department or call 911       Amount and/or Complexity of Data Reviewed  Clinical lab tests: reviewed  Tests in the radiology section of CPT®: reviewed  Tests in the medicine section of CPT®: reviewed               Social Determinants of Health:    Patient is independent, reliable, and has access to care.       Disposition and Care Coordination:    Discharged: The patient is suitable and stable for discharge with no need for consideration of admission.    I have explained discharge medications and the need for follow up  with the patient/caretakers. This was also printed in the discharge instructions. Patient was discharged with the following medications and follow up:      Medication List      No changes were made to your prescriptions during this visit.      Louise Yeager PA-C  [address] [address]  [phone]    On 5/10/2024      Enrique Sanchez MD  [address] [place] [id number]  [phone]    Call on 5/10/2024  Palpitations       Final diagnoses:   Palpitations   Sinus tachycardia        ED Disposition       ED Disposition   Discharge    Condition   Stable    Comment   --               This medical record created using voice recognition software.             Enrique Colon DO  05/10/24 0234

## 2024-05-09 NOTE — DISCHARGE INSTRUCTIONS
Please discuss the need for echocardiogram and Holter monitor with cardiologist    Please stop all caffeine use including coffee and soda    Please return to emergency room immediately for chest pain, chest pressure, shortness of breath, near passing out, passing out, unusual fatigue, usual sweating or any new symptoms that you are concerned with

## 2024-05-10 ENCOUNTER — TELEPHONE (OUTPATIENT)
Dept: INTERNAL MEDICINE | Facility: CLINIC | Age: 29
End: 2024-05-10
Payer: COMMERCIAL

## 2024-05-10 NOTE — TELEPHONE ENCOUNTER
Caller: Raysa Ellis    Relationship to patient: Self    Best call back number: [phone]    Patient is needing: PATIENT CALLED STATING SHE WAS SEEN YESTERDAY BY KADIE PÉREZ AND FORGOT TO MENTION SHE HAD BEEN BITTEN BY A TICK ABOUT A WEEK AGO. PATIENT WANTING TO KNOW IF KADIE PÉREZ THINKS THIS MAY BE THE CAUSE OF ANY OF HER SYMPTOMS OR ANYTHING SHE SHOULD BE CONCERNED ABOUT.

## 2024-05-13 ENCOUNTER — CLINICAL SUPPORT (OUTPATIENT)
Dept: INTERNAL MEDICINE | Facility: CLINIC | Age: 29
End: 2024-05-13
Payer: COMMERCIAL

## 2024-05-13 DIAGNOSIS — Z01.89 ENCOUNTER FOR LABORATORY TEST: Primary | ICD-10-CM

## 2024-05-13 DIAGNOSIS — S30.861A TICK BITE OF ABDOMINAL WALL, INITIAL ENCOUNTER: Primary | ICD-10-CM
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
## 2024-05-13 PROCEDURE — 86618 LYME DISEASE ANTIBODY: CPT | Performed by: PHYSICIAN ASSISTANT

## 2024-05-13 PROCEDURE — 87798 DETECT AGENT NOS DNA AMP: CPT | Performed by: PHYSICIAN ASSISTANT

## 2024-05-13 PROCEDURE — 36415 COLL VENOUS BLD VENIPUNCTURE: CPT | Performed by: PHYSICIAN ASSISTANT

## 2024-05-13 PROCEDURE — 86666 EHRLICHIA ANTIBODY: CPT | Performed by: PHYSICIAN ASSISTANT

## 2024-05-13 NOTE — TELEPHONE ENCOUNTER
Can you ask her if she knows how long tick was there? Or if it was engorged when she removed it? Has she had any rashes (specifically one that looks like a [employer] sign), joint aches, fatigue, or fever?

## 2024-05-13 NOTE — PROGRESS NOTES
Venipuncture Blood Specimen Collection  Venipuncture performed in [facility] by Mirela Rodriguez RN with good hemostasis. Patient tolerated the procedure well without complications.   05/13/24   Mirela Rodriguez RN

## 2024-05-15 LAB — B BURGDOR IGG+IGM SER QL IA: NEGATIVE

## 2024-05-17 LAB — RICKETTSIA RICKETTSII DNA, RT: NOT DETECTED

## 2024-05-20 ENCOUNTER — TELEPHONE (OUTPATIENT)
Dept: INTERNAL MEDICINE | Facility: CLINIC | Age: 29
End: 2024-05-20
Payer: COMMERCIAL

## 2024-05-20 LAB
A PHAGOCYTOPH IGG TITR SER IF: NEGATIVE {TITER}
A PHAGOCYTOPH IGM TITR SER IF: NEGATIVE {TITER}
E CHAFFEENSIS IGG TITR SER IF: NEGATIVE {TITER}
E CHAFFEENSIS IGM TITR SER IF: NEGATIVE {TITER}
RESULT COMMENT:: NORMAL

## 2024-05-20 NOTE — TELEPHONE ENCOUNTER
Called and spoke to pt. Pt states last night her heart rate was racing after the 2 doses and she did take a third after reading online the recommended max dose. Pt states the third dose did help and really wants  to reach back out to her provider to up her dose. Please advise.

## 2024-05-20 NOTE — TELEPHONE ENCOUNTER
Caller: Raysa Ellis    Relationship: Self    Best call back number: [phone]     Which medication are you concerned about: propranolol (INDERAL) 10 MG tablet     Who prescribed you this medication: KADIE PÉREZ       What are your concerns: WOULD LIKE TO KNOW IF SHE CAN TAKE THIS MORE THAN TWICE A DAY TO HELP WITH SYMPTOMS.

## 2024-05-20 NOTE — TELEPHONE ENCOUNTER
Would recommend taking twice daily until her appt next wk and then we can increase dose if indicated and if her blood pressure will tolerate it.

## 2024-05-21 LAB
QT INTERVAL: 324 MS
QTC INTERVAL: 436 MS

## 2024-05-22 ENCOUNTER — PATIENT MESSAGE (OUTPATIENT)
Dept: OBSTETRICS AND GYNECOLOGY | Facility: CLINIC | Age: 29
End: 2024-05-22
Payer: COMMERCIAL

## 2024-05-22 RX ORDER — PROPRANOLOL HYDROCHLORIDE 20 MG/1
20 TABLET ORAL 2 TIMES DAILY
Qty: 60 TABLET | Refills: 1 | Status: SHIPPED | OUTPATIENT
Start: 2024-05-22

## 2024-05-22 RX ORDER — PROPRANOLOL HYDROCHLORIDE 10 MG/1
20 TABLET ORAL 2 TIMES DAILY
Qty: 60 TABLET | Refills: 0 | Status: SHIPPED | OUTPATIENT
Start: 2024-05-22 | End: 2024-05-22

## 2024-05-22 NOTE — TELEPHONE ENCOUNTER
We can increase to 20mg BID but she needs to monitor blood pressure. What has her blood pressure been? Has she been checking it?

## 2024-05-22 NOTE — TELEPHONE ENCOUNTER
Pt return call to the office, explained to pt per provider to monitor Blood pressure, pt stated she is currently not monitoring blood pressure, encouraged pt to monitor BP and also advised pt provider has sent in the 20 mg dose. Pt verbalized understanding and had no further questions at this time,.

## 2024-05-31 ENCOUNTER — OFFICE VISIT (OUTPATIENT)
Dept: INTERNAL MEDICINE | Facility: CLINIC | Age: 29
End: 2024-05-31
Payer: COMMERCIAL

## 2024-05-31 VITALS
OXYGEN SATURATION: 98 % | HEART RATE: 83 BPM | HEIGHT: 64 IN | SYSTOLIC BLOOD PRESSURE: 102 MMHG | TEMPERATURE: 98.3 F | BODY MASS INDEX: 25.44 KG/M2 | WEIGHT: 149 LBS | DIASTOLIC BLOOD PRESSURE: 60 MMHG | RESPIRATION RATE: 16 BRPM

## 2024-05-31 DIAGNOSIS — R00.2 PALPITATIONS: ICD-10-CM

## 2024-05-31 DIAGNOSIS — F41.1 GENERALIZED ANXIETY DISORDER: Primary | ICD-10-CM

## 2024-05-31 DIAGNOSIS — L65.9 HAIR THINNING: ICD-10-CM

## 2024-05-31 PROCEDURE — 1160F RVW MEDS BY RX/DR IN RCRD: CPT | Performed by: PHYSICIAN ASSISTANT

## 2024-05-31 PROCEDURE — 99214 OFFICE O/P EST MOD 30 MIN: CPT | Performed by: PHYSICIAN ASSISTANT

## 2024-05-31 PROCEDURE — 1159F MED LIST DOCD IN RCRD: CPT | Performed by: PHYSICIAN ASSISTANT

## 2024-05-31 RX ORDER — PROPRANOLOL HYDROCHLORIDE 20 MG/1
20 TABLET ORAL 2 TIMES DAILY
Qty: 60 TABLET | Refills: 1 | Status: SHIPPED | OUTPATIENT
Start: 2024-05-31

## 2024-05-31 RX ORDER — DESVENLAFAXINE SUCCINATE 50 MG/1
50 TABLET, EXTENDED RELEASE ORAL DAILY
Qty: 30 TABLET | Refills: 1 | Status: SHIPPED | OUTPATIENT
Start: 2024-05-31

## 2024-05-31 RX ORDER — PROPRANOLOL HYDROCHLORIDE 10 MG/1
10 TABLET ORAL 2 TIMES DAILY
COMMUNITY
End: 2024-05-31

## 2024-05-31 NOTE — ASSESSMENT & PLAN NOTE
Discussed poor control over anxiety. Discussed SSRI, length of time to see improvement, and ADR (including increased risk for SI/HI, sexual dysfunction). Will start SSRI 1/2 tab daily x 1wk then increase to 1 tab daily. Will take 1 month to see max effect. To ER if SI/HIi.

## 2024-05-31 NOTE — PATIENT INSTRUCTIONS
Generalized Anxiety Disorder, Adult  Generalized anxiety disorder (CESAR) is a mental health condition. Unlike normal worries, anxiety related to CESAR is not triggered by a specific event. These worries do not fade or get better with time. CESAR interferes with relationships, work, and school.  CESAR symptoms can vary from mild to severe. People with severe CESAR can have intense waves of anxiety with physical symptoms that are similar to panic attacks.  What are the causes?  The exact cause of CESAR is not known, but the following are believed to have an impact:  Differences in natural brain chemicals.  Genes passed down from parents to children.  Differences in the way threats are perceived.  Development and stress during childhood.  Personality.  What increases the risk?  The following factors may make you more likely to develop this condition:  Being female.  Having a family history of anxiety disorders.  Being very shy.  Experiencing very stressful life events, such as the death of a loved one.  Having a very stressful family environment.  What are the signs or symptoms?  People with CESAR often worry excessively about many things in their lives, such as their health and family. Symptoms may also include:  Mental and emotional symptoms:  Worrying excessively about natural disasters.  Fear of being late.  Difficulty concentrating.  Fears that others are judging your performance.  Physical symptoms:  Fatigue.  Headaches, muscle tension, muscle twitches, trembling, or feeling shaky.  Feeling like your heart is pounding or beating very fast.  Feeling out of breath or like you cannot take a deep breath.  Having trouble falling asleep or staying asleep, or experiencing restlessness.  Sweating.  Nausea, diarrhea, or irritable bowel syndrome (IBS).  Behavioral symptoms:  Experiencing erratic moods or irritability.  Avoidance of new situations.  Avoidance of people.  Extreme difficulty making decisions.  How is this diagnosed?  This  condition is diagnosed based on your symptoms and medical history. You will also have a physical exam. Your health care provider may perform tests to rule out other possible causes of your symptoms.  To be diagnosed with CESAR, a person must have anxiety that:  Is out of his or her control.  Affects several different aspects of his or her life, such as work and relationships.  Causes distress that makes him or her unable to take part in normal activities.  Includes at least three symptoms of CESAR, such as restlessness, fatigue, trouble concentrating, irritability, muscle tension, or sleep problems.  Before your health care provider can confirm a diagnosis of CESAR, these symptoms must be present more days than they are not, and they must last for 6 months or longer.  How is this treated?  This condition may be treated with:  Medicine. Antidepressant medicine is usually prescribed for long-term daily control. Anti-anxiety medicines may be added in severe cases, especially when panic attacks occur.  Talk therapy (psychotherapy). Certain types of talk therapy can be helpful in treating CESAR by providing support, education, and guidance. Options include:  Cognitive behavioral therapy (CBT). People learn coping skills and self-calming techniques to ease their physical symptoms. They learn to identify unrealistic thoughts and behaviors and to replace them with more appropriate thoughts and behaviors.  Acceptance and commitment therapy (ACT). This treatment teaches people how to be mindful as a way to cope with unwanted thoughts and feelings.  Biofeedback. This process trains you to manage your body's response (physiological response) through breathing techniques and relaxation methods. You will work with a therapist while machines are used to monitor your physical symptoms.  Stress management techniques. These include yoga, meditation, and exercise.  A mental health specialist can help determine which treatment is best for you.  Some people see improvement with one type of therapy. However, other people require a combination of therapies.  Follow these instructions at home:  Lifestyle  Maintain a consistent routine and schedule.  Anticipate stressful situations. Create a plan and allow extra time to work with your plan.  Practice stress management or self-calming techniques that you have learned from your therapist or your health care provider.  Exercise regularly and spend time outdoors.  Eat a healthy diet that includes plenty of vegetables, fruits, whole grains, low-fat dairy products, and lean protein.  Do not eat a lot of foods that are high in fat, added sugar, or salt (sodium).  Drink plenty of water.  Avoid alcohol. Alcohol can increase anxiety.  Avoid caffeine and certain over-the-counter cold medicines. These may make you feel worse. Ask your pharmacist which medicines to avoid.  General instructions  Take over-the-counter and prescription medicines only as told by your health care provider.  Understand that you are likely to have setbacks. Accept this and be kind to yourself as you persist to take better care of yourself.  Anticipate stressful situations. Create a plan and allow extra time to work with your plan.  Recognize and accept your accomplishments, even if you  them as small.  Spend time with people who care about you.  Keep all follow-up visits. This is important.  Where to find more information  [place] [employer]: www.nimh.nih.gov  Substance Abuse and Mental Health Services: www.samhsa.gov  Contact a health care provider if:  Your symptoms do not get better.  Your symptoms get worse.  You have signs of depression, such as:  A persistently sad or irritable mood.  Loss of enjoyment in activities that used to bring you elda.  Change in weight or eating.  Changes in sleeping habits.  Get help right away if:  You have thoughts about hurting yourself or others.  If you ever feel like you may hurt  yourself or others, or have thoughts about taking your own life, get help right away. Go to your nearest emergency department or:  Call your local emergency services (571 in the [place]  Call a suicide crisis helpline, such as the National Suicide Prevention Lifeline at 1-866.391.8616 or 974 in the [place] This is open 24 hours a day in the [place]  Text the Crisis Text Line at 924259 (in the [place]  Summary  Generalized anxiety disorder (CESAR) is a mental health condition that involves worry that is not triggered by a specific event.  People with CESAR often worry excessively about many things in their lives, such as their health and family.  CESAR may cause symptoms such as restlessness, trouble concentrating, sleep problems, frequent sweating, nausea, diarrhea, headaches, and trembling or muscle twitching.  A mental health specialist can help determine which treatment is best for you. Some people see improvement with one type of therapy. However, other people require a combination of therapies.  This information is not intended to replace advice given to you by your health care provider. Make sure you discuss any questions you have with your health care provider.  Document Revised: 07/13/2022 Document Reviewed: 04/10/2022  Elsevier Patient Education © 2024 [employer]

## 2024-06-04 ENCOUNTER — OFFICE VISIT (OUTPATIENT)
Dept: CARDIOLOGY | Facility: CLINIC | Age: 29
End: 2024-06-04
Payer: COMMERCIAL

## 2024-06-04 VITALS
SYSTOLIC BLOOD PRESSURE: 114 MMHG | BODY MASS INDEX: 25.95 KG/M2 | WEIGHT: 152 LBS | HEART RATE: 91 BPM | HEIGHT: 64 IN | DIASTOLIC BLOOD PRESSURE: 77 MMHG

## 2024-06-04 DIAGNOSIS — R42 POSTURAL DIZZINESS WITH PRESYNCOPE: Primary | ICD-10-CM

## 2024-06-04 DIAGNOSIS — R55 POSTURAL DIZZINESS WITH PRESYNCOPE: Primary | ICD-10-CM

## 2024-06-04 DIAGNOSIS — R00.2 PALPITATIONS: ICD-10-CM

## 2024-06-04 PROCEDURE — 99204 OFFICE O/P NEW MOD 45 MIN: CPT | Performed by: INTERNAL MEDICINE

## 2024-06-04 RX ORDER — PROPRANOLOL HYDROCHLORIDE 10 MG/1
10 TABLET ORAL 3 TIMES DAILY
Qty: 90 TABLET | Refills: 3 | Status: SHIPPED | OUTPATIENT
Start: 2024-06-04 | End: 2024-06-04 | Stop reason: SDUPTHER

## 2024-06-04 RX ORDER — PROPRANOLOL HYDROCHLORIDE 10 MG/1
10 TABLET ORAL 3 TIMES DAILY
Qty: 90 TABLET | Refills: 3 | Status: SHIPPED | OUTPATIENT
Start: 2024-06-04

## 2024-06-05 NOTE — PROGRESS NOTES
Chief Complaint  Rapid Heart Rate, Palpitations, and Establish Care    Subjective        Raysa Ellis presents to [facility]  History of present illness:    Patient is a 28-year-old female with history of migraines and anxiety who presents with palpitations and presyncope.  The patient states back on May 8 she felt her heart racing fast and became lightheaded and presyncopal.  She did not pass out.  She had just changed from sitting to standing.  She went to the emergency department and had extensive workup.  Since that time she has completely cut out her caffeine.  She is drinking more water.  She does not use much salt.  She still notes in the morning when she gets up she will feel her heart racing.  She does note some lightheadedness with it.  She was put on propranolol for her palpitations.  They increase the dose to 20 twice daily but she did not tolerate this.  She feels like the palpitations are still coming when the propranolol is wearing off.  She wants to try taking it 3 times a day.  She denies any tobacco or alcohol.  Mother and father have no heart disease.      Past Medical History:   Diagnosis Date    Asymptomatic varicose veins of both lower extremities 10/07/2021    Current severe episode of major depressive disorder without psychotic features without prior episode 02/10/2022    Delivery by emergency  10/08/2019    Ectopic pregnancy     Generalized anxiety disorder 02/10/2022    Migraine headache 10/29/2021    Nevus, non-neoplastic 2022    Other specified soft tissue disorders 2022    S/P excision of ganglion cyst 2017    Spider veins     Varicose veins of unspecified lower extremity with pain 07/15/2020    Venous insufficiency (chronic) (peripheral) 07/15/2020         Past Surgical History:   Procedure Laterality Date     SECTION  2019    ENDOVENOUS ABLATION SAPHENOUS VEIN W/ LASER  2023    SALPINGECTOMY Left 2020    WRIST GANGLION  "EXCISION  2018          Social History     Socioeconomic History    Marital status: Single    Number of children: 1    Years of education: 11    Highest education level: 11th grade   Tobacco Use    Smoking status: Former     Current packs/day: 0.00     Average packs/day: 0.5 packs/day for 5.0 years (2.5 ttl pk-yrs)     Types: Cigarettes     Start date:      Quit date:      Years since quittin.4    Smokeless tobacco: Never   Vaping Use    Vaping status: Never Used   Substance and Sexual Activity    Alcohol use: No    Drug use: No    Sexual activity: Yes     Partners: Male     Birth control/protection: Condom, Depo-provera         Family History   Problem Relation Age of Onset    Asthma Mother     Sleep apnea Mother     Hypertension Maternal Grandmother     Breast cancer Neg Hx     Uterine cancer Neg Hx     Ovarian cancer Neg Hx     Colon cancer Neg Hx     Pulmonary embolism Neg Hx     Deep vein thrombosis Neg Hx           No Known Allergies         Current Outpatient Medications:     desvenlafaxine (Pristiq) 50 MG 24 hr tablet, Take 1 tablet by mouth Daily., Disp: 30 tablet, Rfl: 1    medroxyPROGESTERone (DEPO-PROVERA) 150 MG/ML injection, Inject 1 mL into the appropriate muscle as directed by prescriber Every 3 (Three) Months., Disp: 1 each, Rfl: 2    propranolol (INDERAL) 10 MG tablet, Take 1 tablet by mouth 3 (Three) Times a Day., Disp: 90 tablet, Rfl: 3      ROS:  Cardiac review of systems positive for presyncope and palpitations    Objective     /77   Pulse 91   Ht 162.6 cm (64\")   Wt 68.9 kg (152 lb)   BMI 26.09 kg/m²       General Appearance:   well developed  well nourished  HENT:   oropharynx moist  lips not cyanotic  Respiratory:  no respiratory distress  normal breath sounds  no rales  Cardiovascular:  no jugular venous distention  regular rhythm  S1 normal, S2 normal  no S3, no S4   no murmur  no rub, no thrill  No carotid bruit  pedal pulses normal  lower extremity edema: none  " "  Musculoskeletal:  no clubbing of fingers.   normocephalic, head atraumatic  Skin:   warm, dry  Psychiatric:  judgement and insight appropriate  normal mood and affect    ECHO:    STRESS:    CATH:  No results found for this or any previous visit.    BMP:     Glucose   Date Value Ref Range Status   05/09/2024 86 65 - 99 mg/dL Final     BUN   Date Value Ref Range Status   05/09/2024 11 6 - 20 mg/dL Final     Creatinine   Date Value Ref Range Status   05/09/2024 0.80 0.57 - 1.00 mg/dL Final     Sodium   Date Value Ref Range Status   05/09/2024 141 136 - 145 mmol/L Final     Potassium   Date Value Ref Range Status   05/09/2024 3.8 3.5 - 5.2 mmol/L Final     Chloride   Date Value Ref Range Status   05/09/2024 107 98 - 107 mmol/L Final     CO2   Date Value Ref Range Status   05/09/2024 22.0 22.0 - 29.0 mmol/L Final     Calcium   Date Value Ref Range Status   05/09/2024 9.5 8.6 - 10.5 mg/dL Final     BUN/Creatinine Ratio   Date Value Ref Range Status   05/09/2024 13.8 7.0 - 25.0 Final     Anion Gap   Date Value Ref Range Status   05/09/2024 12.0 5.0 - 15.0 mmol/L Final     eGFR   Date Value Ref Range Status   05/09/2024 102.4 >60.0 mL/min/1.73 Final     LIPIDS:  No results found for: \"CHOL\", \"TRIG\", \"HDL\", \"LDL\", \"VLDL\", \"LDLHDL\"      Procedures             ASSESSMENT:  Diagnoses and all orders for this visit:    1. Postural dizziness with presyncope (Primary)  -     Holter Monitor - 72 Hour Up To 15 Days; Future    2. Palpitations  -     Holter Monitor - 72 Hour Up To 15 Days; Future    Other orders  -     Discontinue: propranolol (INDERAL) 10 MG tablet; Take 1 tablet by mouth 3 (Three) Times a Day.  Dispense: 90 tablet; Refill: 3  -     propranolol (INDERAL) 10 MG tablet; Take 1 tablet by mouth 3 (Three) Times a Day.  Dispense: 90 tablet; Refill: 3         PLAN:    1.  Checked orthostatic blood pressure measurements and she did have a significant rise in her heart rate and slight decrease in her blood pressure.  2.  I " feel likely that this is orthostatic hypotension as the cause of the palpitations.  We are going to place a 3-day event recorder just to make sure we do not see any SVTs.  3.  I asked the patient to continue to drink lots of water and start using liberal salt.  4.  Patient's cardiac physical exam is entirely normal.  5.  Reviewed patient's emergency department visit with EKG showing sinus tachycardia at 108 bpm.  She had negative troponins along with TSH, hCG, and D-dimer.  Her hemoglobin was 15.  Chest x-ray was normal.  6.  Patient feels better on the propranolol but did not tolerate the 20 mg dose.  She would like to try the 10 mg dose 3 times a day which I think is fine.  7.  If no significant abnormal heart rhythms are noted and the patient is still symptomatic we may have to consider something such as Florinef and possibly weaning off the propranolol.      Return in about 4 weeks (around 7/2/2024) for ramez sahni.     Patient was given instructions and counseling regarding her condition or for health maintenance advice. Please see specific information pulled into the AVS if appropriate.         Enrique Sanchez MD   6/5/2024  08:43 EDT

## 2024-07-17 ENCOUNTER — TELEPHONE (OUTPATIENT)
Dept: OBSTETRICS AND GYNECOLOGY | Facility: CLINIC | Age: 29
End: 2024-07-17

## 2024-07-17 NOTE — TELEPHONE ENCOUNTER
Caller: Raysa Ellis    Relationship to patient: Self    Best call back number: [phone]     Chief complaint: DISCUSS TUBAL     Type of visit: GYN F/U         Additional notes: PT IS WANTING TO DISCUSS A TUBAL W/ DR MENON - SHE HAS NO AVAILABILITY UNTIL 10/22/24  PT WOULD LIKE TO BE SEEN SOONER THAN THAT AND WOULD LIKE TO KNOW IS THERES ANOTHER PROVIDER SHE COULD SEE SOONER PLEASE CALL

## 2024-07-22 ENCOUNTER — OFFICE VISIT (OUTPATIENT)
Dept: CARDIOLOGY | Facility: CLINIC | Age: 29
End: 2024-07-22
Payer: COMMERCIAL

## 2024-07-22 VITALS
DIASTOLIC BLOOD PRESSURE: 83 MMHG | BODY MASS INDEX: 27.31 KG/M2 | SYSTOLIC BLOOD PRESSURE: 119 MMHG | HEART RATE: 87 BPM | OXYGEN SATURATION: 97 % | HEIGHT: 64 IN | WEIGHT: 160 LBS

## 2024-07-22 DIAGNOSIS — R42 POSTURAL DIZZINESS WITH PRESYNCOPE: Primary | ICD-10-CM

## 2024-07-22 DIAGNOSIS — R00.2 PALPITATIONS: ICD-10-CM

## 2024-07-22 DIAGNOSIS — R55 POSTURAL DIZZINESS WITH PRESYNCOPE: Primary | ICD-10-CM

## 2024-07-22 PROCEDURE — 1159F MED LIST DOCD IN RCRD: CPT

## 2024-07-22 PROCEDURE — 99213 OFFICE O/P EST LOW 20 MIN: CPT

## 2024-07-22 PROCEDURE — 1160F RVW MEDS BY RX/DR IN RCRD: CPT

## 2024-07-22 RX ORDER — PROPRANOLOL HYDROCHLORIDE 10 MG/1
10 TABLET ORAL 3 TIMES DAILY
Qty: 90 TABLET | Refills: 6 | Status: SHIPPED | OUTPATIENT
Start: 2024-07-22

## 2024-07-22 NOTE — PROGRESS NOTES
Chief Complaint  Follow-up (Postural dizziness with presyncope/On Holter /)    Subjective        History of Present Illness  Raysa Ellis presents to [facility] for follow up.   History of Present Illness  Patient is a 28-year-old female who presents for follow-up on palpitations and dizziness/lightheadedness.  She reports significant improvement in her symptoms since starting propranolol.  She denies any chest pain or discomfort, dyspnea, syncope or presyncope.      Past Medical History:   Diagnosis Date    Asymptomatic varicose veins of both lower extremities 10/07/2021    Current severe episode of major depressive disorder without psychotic features without prior episode 02/10/2022    Delivery by emergency  10/08/2019    Ectopic pregnancy     Generalized anxiety disorder 02/10/2022    Migraine headache 10/29/2021    Nevus, non-neoplastic 2022    Other specified soft tissue disorders 2022    S/P excision of ganglion cyst     Spider veins     Varicose veins of unspecified lower extremity with pain 07/15/2020    Venous insufficiency (chronic) (peripheral) 07/15/2020       ALLERGY  No Known Allergies     Past Surgical History:   Procedure Laterality Date     SECTION  2019    ENDOVENOUS ABLATION SAPHENOUS VEIN W/ LASER  2023    SALPINGECTOMY Left 2020    WRIST GANGLION EXCISION  2018        Social History     Socioeconomic History    Marital status: Single    Number of children: 1    Years of education: 11    Highest education level: 11th grade   Tobacco Use    Smoking status: Former     Current packs/day: 0.00     Average packs/day: 0.5 packs/day for 5.0 years (2.5 ttl pk-yrs)     Types: Cigarettes     Start date:      Quit date: 2018     Years since quittin.5    Smokeless tobacco: Never   Vaping Use    Vaping status: Never Used   Substance and Sexual Activity    Alcohol use: No    Drug use: No    Sexual activity: Yes     Partners: Male  "    Birth control/protection: Condom, Depo-provera       Family History   Problem Relation Age of Onset    Asthma Mother     Sleep apnea Mother     Hypertension Maternal Grandmother     Breast cancer Neg Hx     Uterine cancer Neg Hx     Ovarian cancer Neg Hx     Colon cancer Neg Hx     Pulmonary embolism Neg Hx     Deep vein thrombosis Neg Hx         Current Outpatient Medications on File Prior to Visit   Medication Sig    desvenlafaxine (Pristiq) 50 MG 24 hr tablet Take 1 tablet by mouth Daily.    medroxyPROGESTERone (DEPO-PROVERA) 150 MG/ML injection Inject 1 mL into the appropriate muscle as directed by prescriber Every 3 (Three) Months.    [DISCONTINUED] propranolol (INDERAL) 10 MG tablet Take 1 tablet by mouth 3 (Three) Times a Day.     No current facility-administered medications on file prior to visit.       Objective   Vitals:    07/22/24 1314   BP: 119/83   BP Location: Right arm   Patient Position: Sitting   Cuff Size: Adult   Pulse: 87   SpO2: 97%   Weight: 72.6 kg (160 lb)   Height: 162.6 cm (64\")       Physical Exam  Constitutional:       General: She is awake. She is not in acute distress.     Appearance: Normal appearance.   HENT:      Head: Normocephalic.      Nose: Nose normal. No congestion.   Eyes:      Extraocular Movements: Extraocular movements intact.      Conjunctiva/sclera: Conjunctivae normal.      Pupils: Pupils are equal, round, and reactive to light.   Neck:      Thyroid: No thyromegaly.      Vascular: No JVD.   Cardiovascular:      Rate and Rhythm: Normal rate and regular rhythm.      Chest Wall: PMI is not displaced.      Pulses: Normal pulses.      Heart sounds: Normal heart sounds, S1 normal and S2 normal. No murmur heard.     No friction rub. No gallop. No S3 or S4 sounds.   Pulmonary:      Effort: Pulmonary effort is normal.      Breath sounds: Normal breath sounds. No wheezing, rhonchi or rales.   Abdominal:      General: Bowel sounds are normal.      Palpations: Abdomen is soft. "      Tenderness: There is no abdominal tenderness.   Musculoskeletal:      Cervical back: No tenderness.      Right lower leg: No edema.      Left lower leg: No edema.   Lymphadenopathy:      Cervical: No cervical adenopathy.   Skin:     General: Skin is warm and dry.      Capillary Refill: Capillary refill takes less than 2 seconds.      Coloration: Skin is not cyanotic.      Findings: No petechiae or rash.      Nails: There is no clubbing.   Neurological:      Mental Status: She is alert.   Psychiatric:         Mood and Affect: Mood normal.         Behavior: Behavior is cooperative.           Result Review     The following data was reviewed by ALBARO Small on 07/22/24.      CMP          5/8/2024    20:59 5/9/2024    11:16   CMP   Glucose 114  86    BUN 10  11    Creatinine 0.66  0.80    EGFR 122.0  102.4    Sodium 140  141    Potassium 3.2  3.8    Chloride 107  107    Calcium 9.5  9.5    Total Protein 7.1     Albumin 4.6     Globulin 2.5     Total Bilirubin 0.5     Alkaline Phosphatase 89     AST (SGOT) 16     ALT (SGPT) 19     Albumin/Globulin Ratio 1.8     BUN/Creatinine Ratio 15.2  13.8    Anion Gap 14.3  12.0      CBC w/diff          5/8/2024    20:59   CBC w/Diff   WBC 10.19    RBC 5.18    Hemoglobin 15.0    Hematocrit 43.0    MCV 83.0    MCH 29.0    MCHC 34.9    RDW 12.4    Platelets 202    Neutrophil Rel % 71.3    Immature Granulocyte Rel % 0.3    Lymphocyte Rel % 22.5    Monocyte Rel % 5.0    Eosinophil Rel % 0.5    Basophil Rel % 0.4               === Results for orders placed in visit on 06/20/24 ===    HOLTER MONITOR - 72 HOUR UP TO 15 DAY    - Interpretation Summary -    Patient was monitored for 2 days, 17 hours and 52 minutes.    Lowest heart rate is 50 bpm, average heart was 84 bpm, maximum heart rate was 150 bpm.    Rare PVC.  No PACs.    The patient activated events which corresponded to normal heart rhythm (normal sinus rhythm and sinus tachycardia).    No abnormal heart rhythms  noted.          Procedures    Assessment & Plan  Diagnoses and all orders for this visit:    1. Postural dizziness with presyncope (Primary)    2. Palpitations    Other orders  -     propranolol (INDERAL) 10 MG tablet; Take 1 tablet by mouth 3 (Three) Times a Day.  Dispense: 90 tablet; Refill: 6      Assessment & Plan      1.  Continue the propranolol.  Her symptoms seem to have improved since starting this.    2.  Again continue the propranolol.  She notes no palpitations on this medication.  She did not tolerate the 20 mg dose.            Follow Up   Return in about 6 months (around 1/22/2025) for With Dr. Sanchez.    Patient was given instructions and counseling regarding her condition or for health maintenance advice. Please see specific information pulled into the AVS if appropriate.         No Zhang, ALBARO  07/22/24  15:55 EDT    Dictated Utilizing Dragon Dictation

## 2024-08-13 ENCOUNTER — OFFICE VISIT (OUTPATIENT)
Dept: OBSTETRICS AND GYNECOLOGY | Facility: CLINIC | Age: 29
End: 2024-08-13
Payer: COMMERCIAL

## 2024-08-13 VITALS
BODY MASS INDEX: 28.49 KG/M2 | DIASTOLIC BLOOD PRESSURE: 76 MMHG | WEIGHT: 166 LBS | SYSTOLIC BLOOD PRESSURE: 113 MMHG | HEART RATE: 108 BPM

## 2024-08-13 DIAGNOSIS — Z01.419 ENCOUNTER FOR GYNECOLOGICAL EXAMINATION WITHOUT ABNORMAL FINDING: Primary | ICD-10-CM

## 2024-08-13 DIAGNOSIS — Z30.011 ENCOUNTER FOR INITIAL PRESCRIPTION OF CONTRACEPTIVE PILLS: ICD-10-CM

## 2024-08-13 PROCEDURE — 87624 HPV HI-RISK TYP POOLED RSLT: CPT | Performed by: NURSE PRACTITIONER

## 2024-08-13 PROCEDURE — G0123 SCREEN CERV/VAG THIN LAYER: HCPCS | Performed by: NURSE PRACTITIONER

## 2024-08-13 RX ORDER — NORETHINDRONE ACETATE AND ETHINYL ESTRADIOL 1MG-20(21)
1 KIT ORAL DAILY
Qty: 84 TABLET | Refills: 3 | Status: SHIPPED | OUTPATIENT
Start: 2024-08-13 | End: 2025-08-13

## 2024-08-13 NOTE — PROGRESS NOTES
Well Woman Visit    CC: Scheduled annual well gyn visit  Chief Complaint   Patient presents with    Gynecologic Exam     wwe       [facility] intake in the past?: no  DID NOT QUALIFY TODAY    HPI:   29 y.o.   Social History     Substance and Sexual Activity   Sexual Activity Not Currently    Partners: Male    Birth control/protection: Condom, Depo-provera       Menses:   No cycle with Depo Provera       PCP: does manage PMHx and preventative labs  History: PMHx, Meds, Allergies, PSHx, Social Hx, and POBHx all reviewed and updated.    Wants to switch to OCPs for now, having some weight gain and hair loss.  Is planning to have her other tube removed later this year, has consult scheduled.     PHYSICAL EXAM:  /76   Pulse 108   Wt 75.3 kg (166 lb)   BMI 28.49 kg/m²  Not found.     Exam conducted with a chaperone present  General- NAD, alert and oriented, appropriate  Psych- Normal mood, good memory  Neck- No masses, no thyroid enlargement  CV- Regular rhythm, no murnurs  Resp- CTA to bases, no wheezes  Abdomen- Soft, non distended, non tender, no masses    Breast left-  Bilaterally symmetrical, no masses, non tender, no nipple discharge  Breast right- Bilaterally symmetrical, no masses, non tender, no nipple discharge    External genitalia- Normal female, no lesions  Urethra/meatus- Normal, no masses, non tender  Bladder- Normal, no masses, non tender  Vagina- Normal, no atrophy, no lesions, no discharge.  Prolapse : none noted, not examined with split speculum to delineate  Cvx- Normal, no lesions, no discharge, No cervical motion tenderness  Uterus- Normal size, shape & consistency.  Non tender, mobile.  Adnexa- No mass, non tender  Anus/Rectum/Perineum- Not performed    Lymphatic- No palpable neck, axillary, or groin nodes  Ext- No edema, no cyanosis    Skin- No lesions, no rashes, no acanthosis nigricans      ASSESSMENT and PLAN:    Diagnoses and all orders for this visit:    1. Encounter for  gynecological examination without abnormal finding (Primary)  -     IgP, Aptima HPV    2. Encounter for initial prescription of contraceptive pills  -     norethindrone-ethinyl estradiol FE (Junel FE 1/20) 1-20 MG-MCG per tablet; Take 1 tablet by mouth Daily.  Dispense: 84 tablet; Refill: 3        Preventative:  BREAST HEALTH- Monthly self breast exam importance and how to reviewed. MMG and/or MRI (prn) reviewed per society guidelines and her individual history. Screen: Not medically needed  CERVICAL CANCER Screening- Reviewed current ASCCP guidelines for screening w and wo cotest HPV, age specific.  Screen: Updated today  SEXUAL HEALTH: Declines STD screening  VACCINATIONS Recommended: Covid vaccine, Flu annually.  Importance discussed, risk being unvaccinated reviewed.  Questions answered  Smoking status- NON SMOKER/VAPER    ANTONIETA risk w hormonal BIRTH CONTROL reviewed (estrogen containing only), S/Sx to watch for discussed and questions answered.  Newer studies indicate possible increased breast cancer reviewed (both estrogen and progestin only).    She understands the importance of having any ordered tests to be performed in a timely fashion.  The risks of not performing them include, but are not limited to, advanced cancer stages, bone loss from osteoporosis and/or subsequent increase in morbidity and/or mortality.  She is encouraged to review her results online and/or contact or office if she has questions.     Follow Up:  Return in about 1 year (around 8/13/2025) for Annual physical.        Leroy Bradford, APRN  08/13/2024    [facility]  [facility]  [address] [place] [address]  Dept: [phone]  Dept Fax: [phone]  Loc: [phone]

## 2024-08-20 LAB
CYTOLOGIST CVX/VAG CYTO: ABNORMAL
CYTOLOGY CVX/VAG DOC CYTO: ABNORMAL
CYTOLOGY CVX/VAG DOC THIN PREP: ABNORMAL
DX ICD CODE: ABNORMAL
DX ICD CODE: ABNORMAL
HPV I/H RISK 4 DNA CVX QL PROBE+SIG AMP: POSITIVE
Lab: ABNORMAL
OTHER STN SPEC: ABNORMAL
PATHOLOGIST CVX/VAG CYTO: ABNORMAL
STAT OF ADQ CVX/VAG CYTO-IMP: ABNORMAL

## 2024-09-24 RX ORDER — PROPRANOLOL HCL 10 MG
10 TABLET ORAL 3 TIMES DAILY
Qty: 90 TABLET | Refills: 6 | Status: SHIPPED | OUTPATIENT
Start: 2024-09-24

## 2024-09-26 ENCOUNTER — PROCEDURE VISIT (OUTPATIENT)
Dept: OBSTETRICS AND GYNECOLOGY | Facility: CLINIC | Age: 29
End: 2024-09-26
Payer: COMMERCIAL

## 2024-09-26 VITALS
HEIGHT: 64 IN | BODY MASS INDEX: 28.34 KG/M2 | WEIGHT: 166 LBS | SYSTOLIC BLOOD PRESSURE: 111 MMHG | HEART RATE: 116 BPM | DIASTOLIC BLOOD PRESSURE: 79 MMHG

## 2024-09-26 DIAGNOSIS — R87.610 ASCUS WITH POSITIVE HIGH RISK HPV CERVICAL: Primary | ICD-10-CM

## 2024-09-26 DIAGNOSIS — Z01.812 PRE-PROCEDURE LAB EXAM: ICD-10-CM

## 2024-09-26 DIAGNOSIS — R87.810 ASCUS WITH POSITIVE HIGH RISK HPV CERVICAL: Primary | ICD-10-CM

## 2024-09-26 LAB
B-HCG UR QL: NEGATIVE
EXPIRATION DATE: NORMAL
INTERNAL NEGATIVE CONTROL: NORMAL
INTERNAL POSITIVE CONTROL: NORMAL
Lab: NORMAL

## 2024-09-26 PROCEDURE — 88305 TISSUE EXAM BY PATHOLOGIST: CPT | Performed by: OBSTETRICS & GYNECOLOGY

## 2024-09-26 PROCEDURE — 88342 IMHCHEM/IMCYTCHM 1ST ANTB: CPT | Performed by: OBSTETRICS & GYNECOLOGY

## 2024-09-26 PROCEDURE — 88341 IMHCHEM/IMCYTCHM EA ADD ANTB: CPT | Performed by: OBSTETRICS & GYNECOLOGY

## 2024-10-01 LAB
CYTO UR: NORMAL
LAB AP CASE REPORT: NORMAL
LAB AP CLINICAL INFORMATION: NORMAL
LAB AP SPECIAL STAINS: NORMAL
PATH REPORT.FINAL DX SPEC: NORMAL
PATH REPORT.GROSS SPEC: NORMAL

## 2024-10-08 ENCOUNTER — TELEPHONE (OUTPATIENT)
Dept: OBSTETRICS AND GYNECOLOGY | Facility: CLINIC | Age: 29
End: 2024-10-08
Payer: COMMERCIAL

## 2024-10-08 NOTE — TELEPHONE ENCOUNTER
Provider: DR. HENDERSON    Caller: GABRIEL LOPEZ    Relationship to Patient: SELF    Pharmacy:     Phone Number: [phone]     Reason for Call: RESULTS-LABS    When was the patient last seen: 09.26.24    PATIENT WOULD LIKE TO SPEAK WITH DR. SHELTON ABOUT LABS THAT WAS IN [facility].    PATIENT CAN BE REACHED [phone]     THANK YOU

## 2024-10-09 ENCOUNTER — TELEPHONE (OUTPATIENT)
Dept: OBSTETRICS AND GYNECOLOGY | Facility: CLINIC | Age: 29
End: 2024-10-09
Payer: COMMERCIAL

## 2024-10-09 NOTE — TELEPHONE ENCOUNTER
Trying to reach pt - Dr. Cho would like to see the patient on 10/10/24 for an appointment.    [phone] option 3

## 2024-10-09 NOTE — PROGRESS NOTES
GYN Visit    Chief Complaint   Patient presents with    Follow-up       HPI:   29 y.o. here for follow-up on colposcopy.  Patient also desires sterilization.  Pap was ASCUS HPV positive.  Cervical biopsies: ECC positive for JILL-1 with cervical biopsies JILL-1 and JILL-2      History: PMHx, Meds, Allergies, PSHx, Social Hx, and POBHx all reviewed and updated.    PHYSICAL EXAM:  /77   Pulse 102   Wt 73.9 kg (163 lb)   LMP  (LMP Unknown) Comment: No menses since switching from depo to OCP  Breastfeeding No   BMI 27.98 kg/m²   General- NAD, alert and oriented, appropriate  Psych- Normal mood, good memory            Tissue Pathology Exam (2024 16:30)    ASSESSMENT AND PLAN:  Diagnoses and all orders for this visit:    1. Moderate dysplasia of cervix (JILL II) (Primary)  -     Case Request; Standing  -     Case Request    2. Unwanted fertility  -     Case Request; Standing  -     Case Request    Other orders  -     Follow Anesthesia Guidelines / Protocol; Standing  -     Verify / Perform Chlorhexidine Skin Prep; Standing  -     Place Sequential Compression Device; Standing  -     Maintain Sequential Compression Device; Standing  -     Obtain Informed Consent; Standing  -     CBC & Differential; Standing  -     hCG, Serum, Qualitative; Standing    Cervical conization:After the appropriate consents have been obtained, the patient was counseled to the risks and benefits of this procedure to include infection, bleeding, damage to internal organs, bowels, bladder, blood vessels, other internal organs requiring additional surgery to repair or remove any damaged structures.  The patient was given the opportunity to ask questions and her questions were answered to her satisfaction.   Undesired fertility:The patient was counseled on the risks, benefits and alternatives of BTL.  Risks reviewed, but are not limited to: anesthesia, bleeding, transfusion, infection, damage to organs, bowels, bladder, other  internal organs requiring additional surgery to repair or remove any damaged structures.  Possible reoperation, wound separation, blood clots, and death.  Specifically with BTL, she understands it can fail and failure rates are usually less 1/250- 1/300.  If it does fail, she understands she is at higher risk for ectopic or tubal pregnancy and will need evaluation immediately with a positive pregnancy test.   She also understands approximately 10% of women will notice a heavier and more uncomfortable periods. She understands the procedure is meant to be permanent with no guarantee for future fertility after re-anastamosis.  She understands the increased costs associated with tubal reversal and possible IVF. She declines any non-permanent or non-surgical contraception options to include those with similar efficacy and no surgical risk.  All her questions have been answered to her satisfaction and she desires to proceed.Specifically with salpingectomy, she understands it is not reversible and she would require in vitro fertilization (IVF) to conceive in the future. There is a small possibility it could affect the blood flow to her ovaries and therefore cause earlier menopause.  We have discussed if her intra-operative findings indicate that removal her fallopian tubes would not be recommended, she DOES desire tubal occlusion.        Follow Up:  No follow-ups on file.          Cheli Cho,   10/10/2024    [facility]  [address] [place] [address]  Dept: [phone]  Dept Fax: [phone]  Loc: [phone]  Loc Fax: [phone]

## 2024-10-10 ENCOUNTER — OFFICE VISIT (OUTPATIENT)
Dept: OBSTETRICS AND GYNECOLOGY | Facility: CLINIC | Age: 29
End: 2024-10-10
Payer: COMMERCIAL

## 2024-10-10 VITALS
HEART RATE: 102 BPM | BODY MASS INDEX: 27.98 KG/M2 | WEIGHT: 163 LBS | DIASTOLIC BLOOD PRESSURE: 77 MMHG | SYSTOLIC BLOOD PRESSURE: 121 MMHG

## 2024-10-10 DIAGNOSIS — Z30.09 UNWANTED FERTILITY: ICD-10-CM

## 2024-10-10 DIAGNOSIS — N87.1 MODERATE DYSPLASIA OF CERVIX (CIN II): Primary | ICD-10-CM

## 2024-10-15 DIAGNOSIS — Z30.42 ENCOUNTER FOR DEPO-PROVERA CONTRACEPTION: ICD-10-CM

## 2024-10-15 RX ORDER — MEDROXYPROGESTERONE ACETATE 150 MG/ML
INJECTION, SUSPENSION INTRAMUSCULAR
Qty: 1 ML | Refills: 0 | OUTPATIENT
Start: 2024-10-15

## 2024-10-15 NOTE — TELEPHONE ENCOUNTER
Denied refill on Depo.  Last seen 10/10/24.  8/13/24 Junel Fe # 84 with 3 refills by Leroy Bradford

## 2024-11-15 ENCOUNTER — TELEPHONE (OUTPATIENT)
Dept: OBSTETRICS AND GYNECOLOGY | Facility: CLINIC | Age: 29
End: 2024-11-15
Payer: COMMERCIAL

## 2024-11-15 ENCOUNTER — TELEPHONE (OUTPATIENT)
Dept: CARDIOLOGY | Facility: CLINIC | Age: 29
End: 2024-11-15
Payer: COMMERCIAL

## 2024-11-15 PROBLEM — N87.1 MODERATE DYSPLASIA OF CERVIX (CIN II): Status: ACTIVE | Noted: 2024-10-10

## 2024-11-15 PROBLEM — Z30.09 UNWANTED FERTILITY: Status: ACTIVE | Noted: 2024-10-10

## 2024-11-15 NOTE — TELEPHONE ENCOUNTER
Procedure: Hysterectomy    Medication Directive: Propranolol    PMH: Postural dizziness with presyncope, palpitations    Last Seen: 07/22/24    Holter: 06/20/24      Patient was monitored for 2 days, 17 hours and 52 minutes.    Lowest heart rate is 50 bpm, average heart was 84 bpm, maximum heart rate was 150 bpm.    Rare PVC.  No PACs.    The patient activated events which corresponded to normal heart rhythm (normal sinus rhythm and sinus tachycardia).    No abnormal heart rhythms noted.    Patient asked if she will need to hold her medications at anytime due to being put to sleep?

## 2024-11-18 ENCOUNTER — TELEPHONE (OUTPATIENT)
Dept: CARDIOLOGY | Facility: CLINIC | Age: 29
End: 2024-11-18
Payer: COMMERCIAL

## 2024-11-18 NOTE — TELEPHONE ENCOUNTER
[facility] received a fax that requires your attention. The document has been indexed to the patient’s chart for your review.      Reason for sending: EXTERNAL MEDICAL RECORD NOTIFICATION     Documents Description: CARDIAC CLEARANCE REQ-[facility]-11.18.24    Name of Sender: [facility]     Date Indexed: 11.18.24

## 2024-11-18 NOTE — TELEPHONE ENCOUNTER
SW patient. Went over there is no need for holding the propranolol prior to procedure, in fact anesthesia will want you to keep taking. OBGYN will give her information on how long to hold what medications. Patient verbalized understanding and appreciation.

## 2024-11-18 NOTE — TELEPHONE ENCOUNTER
Procedure: bilateral laparoscopic salpingectomy     Medication Directive: NA     PMH: Postural dizziness with presyncope, palpitations     Last Seen: 07/22/24     Holter: 06/20/24       Patient was monitored for 2 days, 17 hours and 52 minutes.    Lowest heart rate is 50 bpm, average heart was 84 bpm, maximum heart rate was 150 bpm.    Rare PVC.  No PACs.    The patient activated events which corresponded to normal heart rhythm (normal sinus rhythm and sinus tachycardia).    No abnormal heart rhythms noted.

## 2024-11-25 ENCOUNTER — OFFICE VISIT (OUTPATIENT)
Dept: INTERNAL MEDICINE | Facility: CLINIC | Age: 29
End: 2024-11-25
Payer: COMMERCIAL

## 2024-11-25 VITALS
BODY MASS INDEX: 30.39 KG/M2 | RESPIRATION RATE: 16 BRPM | TEMPERATURE: 98.2 F | OXYGEN SATURATION: 99 % | HEART RATE: 92 BPM | SYSTOLIC BLOOD PRESSURE: 112 MMHG | WEIGHT: 178 LBS | DIASTOLIC BLOOD PRESSURE: 62 MMHG | HEIGHT: 64 IN

## 2024-11-25 DIAGNOSIS — Z00.00 ANNUAL PHYSICAL EXAM: Primary | ICD-10-CM

## 2024-11-25 DIAGNOSIS — K21.9 GERD WITHOUT ESOPHAGITIS: ICD-10-CM

## 2024-11-25 DIAGNOSIS — M25.561 ACUTE PAIN OF RIGHT KNEE: ICD-10-CM

## 2024-11-25 PROCEDURE — 1160F RVW MEDS BY RX/DR IN RCRD: CPT | Performed by: PHYSICIAN ASSISTANT

## 2024-11-25 PROCEDURE — 1159F MED LIST DOCD IN RCRD: CPT | Performed by: PHYSICIAN ASSISTANT

## 2024-11-25 PROCEDURE — 99395 PREV VISIT EST AGE 18-39: CPT | Performed by: PHYSICIAN ASSISTANT

## 2024-11-25 PROCEDURE — 2014F MENTAL STATUS ASSESS: CPT | Performed by: PHYSICIAN ASSISTANT

## 2024-11-25 RX ORDER — FAMOTIDINE 20 MG/1
20 TABLET, FILM COATED ORAL DAILY
Qty: 30 TABLET | Refills: 1 | Status: SHIPPED | OUTPATIENT
Start: 2024-11-25

## 2024-11-25 NOTE — ASSESSMENT & PLAN NOTE
Discussed GERD symptoms, GERD diet, lifestyle changes (elevate head of bed, limit meals late at night, smaller more frequent meals). Encouraged food diary, avoid trigger foods. Will start H2 blocker to prevent sx.

## 2024-11-25 NOTE — PROGRESS NOTES
"Chief Complaint  Annual Exam and Knee Pain    Subjective          Raysa Ellis presents to [facility]  History of Present Illness  Pt here for physical   Denies chest pain, dizziness, syncope, sob  Heart racing improved with propranolol     R knee pain: sits on knees to play with kids  States 1 month ago she felt a pop when getting up off the floor  Pain worse with cold weather  Denies swelling   Denies pain in hip or ankle   Denies redness or warmth to knee   Has tried ibu and tylenol- helps if pain is mild. But states if pain severe it does not help.     GERD: takes tums once/wk  Red sauce triggers foods  Gets \"metallic taste\" in mouth daily      Past Medical History:   Diagnosis Date    Abnormal Pap smear of cervix     Asymptomatic varicose veins of both lower extremities 10/07/2021    Current severe episode of major depressive disorder without psychotic features without prior episode 02/10/2022    Delivery by emergency  10/08/2019    Ectopic pregnancy     Generalized anxiety disorder 02/10/2022    HPV (human papilloma virus) infection     Migraine headache 10/29/2021    Nevus, non-neoplastic 2022    Other specified soft tissue disorders 2022    S/P excision of ganglion cyst 2017    Spider veins     Varicose veins of unspecified lower extremity with pain 07/15/2020    Venous insufficiency (chronic) (peripheral) 07/15/2020        Past Surgical History:   Procedure Laterality Date     SECTION  2019    COLPOSCOPY  2024    ENDOVENOUS ABLATION SAPHENOUS VEIN W/ LASER  2023    SALPINGECTOMY Left 2020    WRIST GANGLION EXCISION  2018        Current Outpatient Medications on File Prior to Visit   Medication Sig Dispense Refill    norethindrone-ethinyl estradiol FE (Junel FE 1/20) 1-20 MG-MCG per tablet Take 1 tablet by mouth Daily. 84 tablet 3    propranolol (INDERAL) 10 MG tablet Take 1 tablet by mouth 3 (Three) Times a Day. 90 " "tablet 6     No current facility-administered medications on file prior to visit.        No Known Allergies    Social History     Tobacco Use   Smoking Status Former    Current packs/day: 0.00    Average packs/day: 0.5 packs/day for 5.0 years (2.5 ttl pk-yrs)    Types: Cigarettes    Start date:     Quit date:     Years since quittin.9   Smokeless Tobacco Never          Objective   Vital Signs:   /62 (BP Location: Left arm, Patient Position: Sitting, Cuff Size: Adult)   Pulse 92   Temp 98.2 °F (36.8 °C) (Temporal)   Resp 16   Ht 162.6 cm (64\")   Wt 80.7 kg (178 lb)   SpO2 99%   BMI 30.55 kg/m²     Physical Exam  Vitals reviewed.   Constitutional:       Appearance: Normal appearance.   HENT:      Head: Normocephalic and atraumatic.      Nose: Nose normal.      Mouth/Throat:      Mouth: Mucous membranes are moist.   Eyes:      Extraocular Movements: Extraocular movements intact.      Conjunctiva/sclera: Conjunctivae normal.      Pupils: Pupils are equal, round, and reactive to light.   Cardiovascular:      Rate and Rhythm: Normal rate and regular rhythm.   Pulmonary:      Effort: Pulmonary effort is normal.      Breath sounds: Normal breath sounds.   Abdominal:      General: Abdomen is flat. Bowel sounds are normal.      Palpations: Abdomen is soft.   Musculoskeletal:         General: Normal range of motion.   Neurological:      General: No focal deficit present.      Mental Status: She is alert and oriented to person, place, and time.   Psychiatric:         Mood and Affect: Mood normal.        Result Review :                     Assessment and Plan    Diagnoses and all orders for this visit:    1. Annual physical exam (Primary)  Assessment & Plan:  Reviewed preventative medication recommendations that are age appropriate for the patient. Education provided for health and wellness. Encouraged healthy diet, regular exercise, and routine wellness checkups.        2. Acute pain of right " knee  Comments:  X-ray normal.  Continue Tylenol/Motrin as needed.  Will refer to PT for stretching strengthening.  Orders:  -     XR Knee 3 View Right; Future  -     Ambulatory Referral to Physical Therapy for Evaluation & Treatment    3. GERD without esophagitis  Assessment & Plan:  Discussed GERD symptoms, GERD diet, lifestyle changes (elevate head of bed, limit meals late at night, smaller more frequent meals). Encouraged food diary, avoid trigger foods. Will start H2 blocker to prevent sx.        Other orders  -     famotidine (Pepcid) 20 MG tablet; Take 1 tablet by mouth Daily.  Dispense: 30 tablet; Refill: 1        Follow Up   Return in about 1 month (around 12/25/2024).  Patient was given instructions and counseling regarding her condition or for health maintenance advice. Please see specific information pulled into the AVS if appropriate.

## 2024-12-31 NOTE — PRE-PROCEDURE INSTRUCTIONS
ATIENT INSTRUCTED TO BE:    - NOTHING TO EAT AFTER MIDNIGHT OR CHEW, EXCEPT CAN HAVE CLEAR LIQUIDS 2 HOURS PRIOR TO SURGERY ARRIVAL TIME , NO MORE THAN 8 OZ. (NOTHING RED)     - TO HOLD ALL VITAMINS, SUPPLEMENTS, NSAIDS FOR ONE WEEK PRIOR TO THEIR SURGICAL PROCEDURE    - DO NOT TAKE ______________________ 7 DAYS PRIOR TO PROCEDURE PER ANESTHESIA RECOMMENDATIONS/INSTRUCTIONS     - INSTRUCTED PT TO USE SURGICAL SOAP 1 TIME THE NIGHT PRIOR TO SURGERY ___________ OR THE AM OF SURGERY _____________   USE THE SOAP FROM NECK TO TOES, AVOID THEIR FACE, HAIR, AND PRIVATE PARTS. IF USE THE SOAP THE NIGHT PRIOR TO SURGERY, CHANGE BED LINENS AND NO PETS IN THE BED.     INSTRUCTED NO LOTIONS, JEWELRY, PIERCINGS,  NAIL POLISH, OR DEODORANT DAY OF SURGERY    - IF DIABETIC, CHECK BLOOD GLUCOSE IF LESS THAN 70 OR HAVING SYMPTOMS CALL THE PREOP AREA FOR INSTRUCTIONS ON AM OF SURGERY ([phone])    -INSTRUCTED TO TAKE THE FOLLOWING MEDICATIONS THE DAY OF SURGERY WITH SIPS OF WATER:   PROPRANOLOL        - DO NOT BRING ANY MEDICATIONS WITH YOU TO THE HOSPITAL THE DAY OF SURGERY, EXCEPT IF USE INHALERS. BRING INHALERS DAY OF SURGERY       - BRING CPAP OR BIPAP TO THE HOSPITAL ONLY IF YOU ARE SPENDING THE NIGHT    - DO NOT SMOKE OR VAPE 24 HOURS PRIOR TO PROCEDURE PER ANESTHESIA REQUEST     -MAKE SURE YOU HAVE A RIDE HOME OR SOMEONE TO STAY WITH YOU THE DAY OF THE PROCEDURE AFTER YOU GO HOME     - FOLLOW ANY OTHER INSTRUCTIONS GIVEN TO YOU BY YOUR SURGEON'S OFFICE.     - DAY OF SURGERY ____________, COME TO ELEVATOR A, THIRD FLOOR, CHECK IN AT THE DESK FOR REGISTRATION/SURGERY  - YOU WILL RECEIVE A PHONE CALL THE DAY PRIOR TO SURGERY BETWEEN 1PM AND 4 PM WITH ARRIVAL TIME, IF YOUR SURGERY IS ON A MONDAY YOU WILL RECEIVE A CALL THE FRIDAY PRIOR TO SURGERY DATE    - BRING CASH OR CREDIT CARD FOR COPAYMENT OF MEDICATIONS AFTER SURGERY IF YOU USE THE HOSPITAL PHARMACY (MEDS TO BED)    - PREADMISSION TESTING NURSE ZEFERINO BUSTAMANTE [phone] IF  HAVE ANY QUESTIONS     -PATIENT PROVIDED THE NUMBER FOR PREOP SURGICAL DEPT IF HAD QUESTIONS AFTER HOURS PRIOR TO SURGERY ([phone] ).  INFORMED PT IF NO ANSWER, LEAVE A MESSAGE AND SOMEONE WILL RETURN THEIR CALL       PATIENT VERBALIZED UNDERSTANDING

## 2025-01-06 PROCEDURE — S0260 H&P FOR SURGERY: HCPCS | Performed by: OBSTETRICS & GYNECOLOGY

## 2025-01-07 NOTE — H&P
[facility]   HISTORY AND PHYSICAL    Patient Name:Raysa Ellis  : 1995  MRN: [id number]  Primary Care Physician: Louise Yeager PA-C  Date of admission: (Not on file)    Subjective   Subjective     Chief Complaint: here for surgery    History of Present Illness   Raysa Ellis is a 29 y.o. female  here for bilateral tubal occlusion vs salpingectomy and conization of the cervix.   Pap was ASCUS HPV positive.  Cervical biopsies: ECC positive for JILL-1 with cervical biopsies JILL-1 and JILL-2     Review of Systems   Constitutional: Negative.    HENT: Negative.     Eyes: Negative.    Respiratory: Negative.     Cardiovascular: Negative.    Gastrointestinal: Negative.    Endocrine: Negative.    Genitourinary: Negative.    Musculoskeletal: Negative.    Skin: Negative.    Allergic/Immunologic: Negative.    Neurological: Negative.    Hematological: Negative.    Psychiatric/Behavioral: Negative.           Personal History     Past Medical History:   Diagnosis Date    Abnormal Pap smear of cervix     Asymptomatic varicose veins of both lower extremities 10/07/2021    Current severe episode of major depressive disorder without psychotic features without prior episode 02/10/2022    Delivery by emergency  10/08/2019    Ectopic pregnancy     Generalized anxiety disorder 02/10/2022    HPV (human papilloma virus) infection     Migraine headache 10/29/2021    Nevus, non-neoplastic 2022    Other specified soft tissue disorders 2022    Rapid heart rate     24 HAD EPISODE OF [facility] W/NEAR SYNCOPY, ON BB, SEES [facility], LAST EPISODE 2024 NO CP OR SOA    S/P excision of ganglion cyst     Spider veins     Varicose veins of unspecified lower extremity with pain 07/15/2020    Venous insufficiency (chronic) (peripheral) 07/15/2020    NO CURRENT ISSUES       Past Surgical History:   Procedure Laterality Date     SECTION  2019    COLPOSCOPY  2024    ENDOVENOUS ABLATION SAPHENOUS VEIN  W/ LASER  03/2023    SALPINGECTOMY Left 09/2020    R/T ECTOPIC PREG    WRIST GANGLION EXCISION Left 02/2018       Family History: Her family history includes Asthma in her mother; Hypertension in her maternal grandmother; Sleep apnea in her mother.     Social History: She  reports that she quit smoking about 7 years ago. Her smoking use included cigarettes. She started smoking about 12 years ago. She has a 2.5 pack-year smoking history. She has never used smokeless tobacco. She reports that she does not drink alcohol and does not use drugs.    Home Medications:  norethindrone-ethinyl estradiol FE and propranolol    Allergies:  She has No Known Allergies.    Objective    Objective     Vitals:         Physical Exam  Vitals and nursing note reviewed. Exam conducted with a chaperone present.   Constitutional:       Appearance: Normal appearance.   HENT:      Head: Normocephalic.   Cardiovascular:      Rate and Rhythm: Normal rate and regular rhythm.      Pulses: Normal pulses.      Heart sounds: Normal heart sounds.   Pulmonary:      Effort: Pulmonary effort is normal.      Breath sounds: Normal breath sounds.   Abdominal:      General: Bowel sounds are normal.      Palpations: Abdomen is soft.   Genitourinary:     Comments: Nml female external genitalia.  Cervix is parous. Uterus axial normal size shape and consistency.  No adnexal masses are appreciated    Musculoskeletal:         General: Normal range of motion.      Cervical back: Normal range of motion.   Skin:     General: Skin is warm and dry.   Neurological:      General: No focal deficit present.      Mental Status: She is alert and oriented to person, place, and time.   Psychiatric:         Mood and Affect: Mood normal.         Behavior: Behavior normal.          Result Review    Result Review:  I have personally reviewed the results from the time of this admission to 1/6/2025 19:26 EST and agree with these findings:  []  Laboratory list / accordion  []   Microbiology  []  Radiology  []  EKG/Telemetry   []  Cardiology/Vascular   []  Pathology  []  Old records  []  Other:  Most notable findings include:       Assessment & Plan   Assessment / Plan     Brief Patient Summary:  Raysa Ellis is a 29 y.o. female here for bilateral tubal occlusion vs salpingectomy and conization of the cervix.    Active Hospital Problems:  Active Hospital Problems    Diagnosis     Moderate dysplasia of cervix (JILL II)     Unwanted fertility      Plan:   Cervical conization:After the appropriate consents have been obtained, the patient was counseled to the risks and benefits of this procedure to include infection, bleeding, damage to internal organs, bowels, bladder, blood vessels, other internal organs requiring additional surgery to repair or remove any damaged structures.  The patient was given the opportunity to ask questions and her questions were answered to her satisfaction.   Undesired fertility:The patient was counseled on the risks, benefits and alternatives of BTL.  Risks reviewed, but are not limited to: anesthesia, bleeding, transfusion, infection, damage to organs, bowels, bladder, other internal organs requiring additional surgery to repair or remove any damaged structures.  Possible reoperation, wound separation, blood clots, and death.  Specifically with BTL, she understands it can fail and failure rates are usually less 1/250- 1/300.  If it does fail, she understands she is at higher risk for ectopic or tubal pregnancy and will need evaluation immediately with a positive pregnancy test.   She also understands approximately 10% of women will notice a heavier and more uncomfortable periods. She understands the procedure is meant to be permanent with no guarantee for future fertility after re-anastamosis.  She understands the increased costs associated with tubal reversal and possible IVF. She declines any non-permanent or non-surgical contraception options to include those  with similar efficacy and no surgical risk.  All her questions have been answered to her satisfaction and she desires to proceed.Specifically with salpingectomy, she understands it is not reversible and she would require in vitro fertilization (IVF) to conceive in the future. There is a small possibility it could affect the blood flow to her ovaries and therefore cause earlier menopause.  We have discussed if her intra-operative findings indicate that removal her fallopian tubes would not be recommended, she DOES desire tubal occlusion.    VTE Prophylaxis:  No VTE prophylaxis order currently exists.        Cheli Cho, DO

## 2025-01-10 ENCOUNTER — TELEPHONE (OUTPATIENT)
Dept: OBSTETRICS AND GYNECOLOGY | Facility: CLINIC | Age: 30
End: 2025-01-10
Payer: COMMERCIAL

## 2025-01-10 NOTE — TELEPHONE ENCOUNTER
Called pt to reschedule her surgery from 1/7/25 - Pt has asked about anesthesia - she does not want to be put to sleep -  Dr. Cho has advised the following:  She can have cone done without being put to sleep (light sedation) but not tubal. We have to go into abdomen so she must be put to sleep.  Patient has been advised and wants to call back when ready to schedule.  Pt advised that with the cone that she should not put this surgery off for an extended amount of time.

## 2025-01-16 ENCOUNTER — OFFICE VISIT (OUTPATIENT)
Dept: INTERNAL MEDICINE | Facility: CLINIC | Age: 30
End: 2025-01-16
Payer: COMMERCIAL

## 2025-01-16 VITALS
HEIGHT: 64 IN | SYSTOLIC BLOOD PRESSURE: 118 MMHG | HEART RATE: 86 BPM | TEMPERATURE: 97.3 F | WEIGHT: 172.2 LBS | BODY MASS INDEX: 29.4 KG/M2 | RESPIRATION RATE: 14 BRPM | OXYGEN SATURATION: 97 % | DIASTOLIC BLOOD PRESSURE: 78 MMHG

## 2025-01-16 DIAGNOSIS — M50.30 DDD (DEGENERATIVE DISC DISEASE), CERVICAL: ICD-10-CM

## 2025-01-16 DIAGNOSIS — M54.2 NECK PAIN: Primary | ICD-10-CM

## 2025-01-16 PROCEDURE — 1126F AMNT PAIN NOTED NONE PRSNT: CPT | Performed by: NURSE PRACTITIONER

## 2025-01-16 PROCEDURE — 99213 OFFICE O/P EST LOW 20 MIN: CPT | Performed by: NURSE PRACTITIONER

## 2025-01-16 RX ORDER — DIFLUNISAL 500 MG/1
TABLET, FILM COATED ORAL
Qty: 60 TABLET | Refills: 0 | Status: SHIPPED | OUTPATIENT
Start: 2025-01-16 | End: 2025-01-17

## 2025-01-16 RX ORDER — CYCLOBENZAPRINE HCL 5 MG
5 TABLET ORAL 3 TIMES DAILY PRN
Qty: 30 TABLET | Refills: 0 | Status: SHIPPED | OUTPATIENT
Start: 2025-01-16

## 2025-01-16 NOTE — PROGRESS NOTES
"Chief Complaint  Neck Pain (Neck pain that also has pressure/)    Subjective        Raysa Ellis [place] [facility] for concerns of neck pain.  Patient reports over the last week she has been having neck pain.  She woke up feeling this way.  She normally sleeps on her front side, but turns her neck 1 side or the other when sleeping.  She states the pain feels like it is bilateral neck.  The pain radiates up into the posterior head.  Does not feel like a headache, chest pressure.    Objective   Vital Signs:   /78 (BP Location: Left arm, Patient Position: Sitting, Cuff Size: Adult)   Pulse 86   Temp 97.3 °F (36.3 °C) (Temporal)   Resp 14   Ht 162.6 cm (64.02\")   Wt 78.1 kg (172 lb 3.2 oz)   SpO2 97%   BMI 29.54 kg/m²     Physical Exam  Vitals and nursing note reviewed.   Constitutional:       Appearance: Normal appearance.   HENT:      Head: Normocephalic and atraumatic.      Mouth/Throat:      Mouth: Mucous membranes are moist.   Eyes:      Pupils: Pupils are equal, round, and reactive to light.   Neck:      Comments: Slight tenderness to bilateral neck muscles, no bony tenderness.  Normal range of motion.  No pain with flexion or extension, no radicular symptoms reported  Pulmonary:      Effort: Pulmonary effort is normal.   Musculoskeletal:      Cervical back: Normal range of motion and neck supple.   Neurological:      Mental Status: She is alert.        Result Review :  {The following data was reviewed by ALBARO Davey on 01/16/25         XR Spine Cervical Complete 4 or 5 View    Result Date: 1/16/2025  Narrative: XR SPINE CERVICAL COMPLETE 4 OR 5 VW Date of Exam: 1/16/2025 2:13 PM EST Indication: Neck pain Comparison: None available. Findings: Prevertebral soft tissues normal. No fracture or destructive bone lesion. Mild loss of disc base height at C5-C6. No endplate or uncovertebral spurring. No other degenerative changes     Impression: Impression: Mild " degenerative disc disease at C5-C6 Electronically Signed: Ketan Manuel  1/16/2025 2:35 PM EST  Workstation ID: [id number]          Diagnoses and all orders for this visit:    1. Neck pain (Primary)  -     XR Spine Cervical Complete 4 or 5 View    2. DDD (degenerative disc disease), cervical    Other orders  -     diflunisal (DOLOBID) 500 MG tablet tablet; Take 2 tablets with first dose, then 1 tablet every 12 hours thereafter.  Do not exceed 2 weeks.  Dispense: 60 tablet; Refill: 0  -     cyclobenzaprine (FLEXERIL) 5 MG tablet; Take 1 tablet by mouth 3 (Three) Times a Day As Needed for Muscle Spasms. May take 1/2-1 full tablet 3 times daily as needed  Dispense: 30 tablet; Refill: 0    Patient with new onset of neck pain, happening after she woke up.  She does have evidence on x-ray of degenerative disc disease, primarily at the C5-C6 level.  Likely a strain/sprain versus spasm.  Will treat conservatively since her no red flag symptoms.  Anti-inflammatory, muscle relaxer.  Advised to follow-up closely if any symptoms continue to persist or worsen.      Follow Up   No follow-ups on file.  Patient was given instructions and counseling regarding her condition or for health maintenance advice. Please see specific information pulled into the AVS if appropriate.     ALBARO Davey  1/16/2025  This note was electronically signed.

## 2025-01-17 ENCOUNTER — TELEPHONE (OUTPATIENT)
Dept: INTERNAL MEDICINE | Facility: CLINIC | Age: 30
End: 2025-01-17
Payer: COMMERCIAL

## 2025-01-17 ENCOUNTER — PRIOR AUTHORIZATION (OUTPATIENT)
Dept: INTERNAL MEDICINE | Facility: CLINIC | Age: 30
End: 2025-01-17
Payer: COMMERCIAL

## 2025-01-17 RX ORDER — NAPROXEN 500 MG/1
500 TABLET ORAL 2 TIMES DAILY WITH MEALS
Qty: 60 TABLET | Refills: 0 | Status: SHIPPED | OUTPATIENT
Start: 2025-01-17

## 2025-01-17 NOTE — TELEPHONE ENCOUNTER
Pt called into office, explained to pt provider has sent in a different medication for pt, pt verbalized understanding and had no further questions at this time.

## 2025-01-17 NOTE — TELEPHONE ENCOUNTER
Diflunisal 500MG tablets    This request has not been approved. Based on the information we received, you did not meet the specific rule(s) needed to approve this request. In some cases, the requested drug or alternatives offered may have other guideline rules that need to be met. Our guideline named NONSTEROIDAL ANTI-INFLAMMATORY DRUGS requires the following rule(s) be met for approval: The member had at least a 3 day trial and failure [drug did not work], allergy, contraindication [harmful for] (including potential drug-drug interactions with other medications), or intolerance [side effect] to 2 preferred agents: celecoxib, diclofenac sodium DR/EC tablets, diclofenac sodium 1% topical gel, ibuprofen tablets, indomethacin capsules, indomethacin ER capsules, ketorolac tablets, meloxicam tablets, nabumetone tablets, naproxen sodium tablets, naproxen tablets, piroxicam capsules, sulindac tablets.This is why your request is denied. Please work with your doctor to use a different medication or get us more information if it will allow us to approve this request. A written notification letter will follow with additional details.

## 2025-01-27 ENCOUNTER — OFFICE VISIT (OUTPATIENT)
Dept: CARDIOLOGY | Facility: CLINIC | Age: 30
End: 2025-01-27
Payer: COMMERCIAL

## 2025-01-27 VITALS
DIASTOLIC BLOOD PRESSURE: 77 MMHG | SYSTOLIC BLOOD PRESSURE: 122 MMHG | HEART RATE: 108 BPM | HEIGHT: 64 IN | BODY MASS INDEX: 30.22 KG/M2 | WEIGHT: 177 LBS

## 2025-01-27 DIAGNOSIS — R07.9 CHEST PAIN, UNSPECIFIED TYPE: ICD-10-CM

## 2025-01-27 DIAGNOSIS — R00.2 PALPITATIONS: Primary | ICD-10-CM

## 2025-01-27 DIAGNOSIS — R42 POSTURAL DIZZINESS WITH PRESYNCOPE: ICD-10-CM

## 2025-01-27 DIAGNOSIS — R55 POSTURAL DIZZINESS WITH PRESYNCOPE: ICD-10-CM

## 2025-01-27 PROCEDURE — 99214 OFFICE O/P EST MOD 30 MIN: CPT | Performed by: INTERNAL MEDICINE

## 2025-01-27 NOTE — PROGRESS NOTES
"Chief Complaint  Follow-up    Subjective        Raysa Ellis presents to [facility]  History of present illness:    Patient states her palpitation and dizziness is overall doing well.  She is only taken the propranolol once a day at about 3 to 4 PM.  When she was taking the second dose she felt \"a little bit off like lightheaded.\"  She is drinking lots of water and using liberal salt.  She is limiting caffeine.  She does note sporadic chest pain.  It usually when she is up moving around and in the left chest.  It occurs maybe 1 time a week.  She does note that it seems to occur right before she takes her medication.      Past Medical History:   Diagnosis Date    Abnormal Pap smear of cervix     Asymptomatic varicose veins of both lower extremities 10/07/2021    Current severe episode of major depressive disorder without psychotic features without prior episode 02/10/2022    Delivery by emergency  10/08/2019    Ectopic pregnancy     Generalized anxiety disorder 02/10/2022    HPV (human papilloma virus) infection     Migraine headache 10/29/2021    Nevus, non-neoplastic 2022    Other specified soft tissue disorders 2022    Rapid heart rate     24 HAD EPISODE OF RHR W/NEAR SYNCOPY, ON BB, SEES MONCARMEN, LAST EPISODE 2024 NO CP OR SOA    S/P excision of ganglion cyst     Spider veins     Varicose veins of unspecified lower extremity with pain 07/15/2020    Venous insufficiency (chronic) (peripheral) 07/15/2020    NO CURRENT ISSUES         Past Surgical History:   Procedure Laterality Date     SECTION  2019    COLPOSCOPY  2024    ENDOVENOUS ABLATION SAPHENOUS VEIN W/ LASER  2023    SALPINGECTOMY Left 2020    R/T ECTOPIC PREG    WRIST GANGLION EXCISION Left 2018          Social History     Socioeconomic History    Marital status: Single    Number of children: 1    Years of education: 11    Highest education level: 11th grade   Tobacco Use    " "Smoking status: Former     Current packs/day: 0.00     Average packs/day: 0.5 packs/day for 5.0 years (2.5 ttl pk-yrs)     Types: Cigarettes     Start date:      Quit date:      Years since quittin.0    Smokeless tobacco: Never   Vaping Use    Vaping status: Some Days   Substance and Sexual Activity    Alcohol use: No    Drug use: No    Sexual activity: Not Currently     Partners: Male     Birth control/protection: Condom, Birth control pill         Family History   Problem Relation Age of Onset    Asthma Mother     Sleep apnea Mother     Hypertension Maternal Grandmother     Breast cancer Neg Hx     Uterine cancer Neg Hx     Ovarian cancer Neg Hx     Colon cancer Neg Hx     Pulmonary embolism Neg Hx     Deep vein thrombosis Neg Hx     Melanoma Neg Hx     Prostate cancer Neg Hx     Malig Hyperthermia Neg Hx           No Known Allergies         Current Outpatient Medications:     cyclobenzaprine (FLEXERIL) 5 MG tablet, Take 1 tablet by mouth 3 (Three) Times a Day As Needed for Muscle Spasms. May take 1/2-1 full tablet 3 times daily as needed, Disp: 30 tablet, Rfl: 0    naproxen (Naprosyn) 500 MG tablet, Take 1 tablet by mouth 2 (Two) Times a Day With Meals. (Patient taking differently: Take 1 tablet by mouth 2 (Two) Times a Day As Needed for Mild Pain, Moderate Pain or Headache.), Disp: 60 tablet, Rfl: 0    norethindrone-ethinyl estradiol FE (Junel FE ) 1-20 MG-MCG per tablet, Take 1 tablet by mouth Daily., Disp: 84 tablet, Rfl: 3    propranolol (INDERAL) 10 MG tablet, Take 1 tablet by mouth 3 (Three) Times a Day. (Patient taking differently: Take 1 tablet by mouth 2 (Two) Times a Day. TAKES AT 12 AND 6), Disp: 90 tablet, Rfl: 6      ROS:  Cardiac review of systems positive for chest pain.    Objective     /77 (BP Location: Left arm, Patient Position: Sitting, Cuff Size: Adult)   Pulse 108   Ht 162.6 cm (64\")   Wt 80.3 kg (177 lb)   BMI 30.38 kg/m²       General Appearance:   well " "developed  well nourished  HENT:   oropharynx moist  lips not cyanotic  Respiratory:  no respiratory distress  normal breath sounds  no rales  Cardiovascular:  no jugular venous distention  regular rhythm  S1 normal, S2 normal  no S3, no S4   no murmur  no rub, no thrill  No carotid bruit  pedal pulses normal  lower extremity edema: none    Musculoskeletal:  no clubbing of fingers.   normocephalic, head atraumatic  Skin:   warm, dry  Psychiatric:  judgement and insight appropriate  normal mood and affect    ECHO:    STRESS:    CATH:  No results found for this or any previous visit.    BMP:     Glucose   Date Value Ref Range Status   05/09/2024 86 65 - 99 mg/dL Final     BUN   Date Value Ref Range Status   05/09/2024 11 6 - 20 mg/dL Final     Creatinine   Date Value Ref Range Status   05/09/2024 0.80 0.57 - 1.00 mg/dL Final     Sodium   Date Value Ref Range Status   05/09/2024 141 136 - 145 mmol/L Final     Potassium   Date Value Ref Range Status   05/09/2024 3.8 3.5 - 5.2 mmol/L Final     Chloride   Date Value Ref Range Status   05/09/2024 107 98 - 107 mmol/L Final     CO2   Date Value Ref Range Status   05/09/2024 22.0 22.0 - 29.0 mmol/L Final     Calcium   Date Value Ref Range Status   05/09/2024 9.5 8.6 - 10.5 mg/dL Final     BUN/Creatinine Ratio   Date Value Ref Range Status   05/09/2024 13.8 7.0 - 25.0 Final     Anion Gap   Date Value Ref Range Status   05/09/2024 12.0 5.0 - 15.0 mmol/L Final     eGFR   Date Value Ref Range Status   05/09/2024 102.4 >60.0 mL/min/1.73 Final     LIPIDS:  No results found for: \"CHOL\", \"TRIG\", \"HDL\", \"LDL\", \"VLDL\", \"LDLHDL\"      Procedures             ASSESSMENT:  Diagnoses and all orders for this visit:    1. Palpitations (Primary)  -     Lipid Panel; Future    2. Postural dizziness with presyncope    3. Chest pain, unspecified type         PLAN:    1.  Patient's chest pain is very sporadic.  It does not occur every time she exerts herself.  We have just planned on continue to " watch it and if it started coming more frequently let us know.  If it does we would have to consider a treadmill EKG.  I do think this would be fairly low yield.  2.  Patient is taking the propranolol once a day and tolerates it better just once a day.  She notes no significant palpitations or lightheadedness.  We will just continue to monitor.  3.  Encouraged the patient to continue to drink plenty of water, use liberal salt, and limit caffeine.  4.  Will continue to follow closely.  Again if her chest pain starts coming more frequently she will let us know.      Return in about 6 months (around 7/27/2025).     Patient was given instructions and counseling regarding her condition or for health maintenance advice. Please see specific information pulled into the AVS if appropriate.         Enrique Sanchez MD   1/27/2025  13:10 EST

## 2025-02-04 ENCOUNTER — TELEPHONE (OUTPATIENT)
Dept: INTERNAL MEDICINE | Facility: CLINIC | Age: 30
End: 2025-02-04
Payer: COMMERCIAL

## 2025-02-04 NOTE — TELEPHONE ENCOUNTER
Caller: Raysa Ellis    Relationship to patient: Self    Best call back number: [phone]    Chief complaint: NECK IS SWOLLEN     Type of visit: OFFICE VISIT     Requested date: AS SOON AS POSSIBLE       Additional notes: PLEASE CALL AND ADVISE

## 2025-02-10 ENCOUNTER — OFFICE VISIT (OUTPATIENT)
Dept: INTERNAL MEDICINE | Facility: CLINIC | Age: 30
End: 2025-02-10
Payer: COMMERCIAL

## 2025-02-10 VITALS
WEIGHT: 174 LBS | BODY MASS INDEX: 29.71 KG/M2 | HEIGHT: 64 IN | OXYGEN SATURATION: 99 % | TEMPERATURE: 97.4 F | DIASTOLIC BLOOD PRESSURE: 64 MMHG | HEART RATE: 93 BPM | SYSTOLIC BLOOD PRESSURE: 120 MMHG

## 2025-02-10 DIAGNOSIS — R00.2 PALPITATIONS: ICD-10-CM

## 2025-02-10 DIAGNOSIS — R59.1 LYMPHADENOPATHY: Primary | ICD-10-CM

## 2025-02-10 LAB
ALBUMIN SERPL-MCNC: 4.4 G/DL (ref 3.5–5.2)
ALBUMIN/GLOB SERPL: 1.7 G/DL
ALP SERPL-CCNC: 102 U/L (ref 39–117)
ALT SERPL W P-5'-P-CCNC: 22 U/L (ref 1–33)
ANION GAP SERPL CALCULATED.3IONS-SCNC: 9 MMOL/L (ref 5–15)
AST SERPL-CCNC: 21 U/L (ref 1–32)
BASOPHILS # BLD AUTO: 0.03 10*3/MM3 (ref 0–0.2)
BASOPHILS NFR BLD AUTO: 0.5 % (ref 0–1.5)
BILIRUB SERPL-MCNC: 0.6 MG/DL (ref 0–1.2)
BUN SERPL-MCNC: 9 MG/DL (ref 6–20)
BUN/CREAT SERPL: 15.3 (ref 7–25)
CALCIUM SPEC-SCNC: 9.5 MG/DL (ref 8.6–10.5)
CHLORIDE SERPL-SCNC: 103 MMOL/L (ref 98–107)
CHOLEST SERPL-MCNC: 182 MG/DL (ref 0–200)
CO2 SERPL-SCNC: 24 MMOL/L (ref 22–29)
CREAT SERPL-MCNC: 0.59 MG/DL (ref 0.57–1)
DEPRECATED RDW RBC AUTO: 38.7 FL (ref 37–54)
EGFRCR SERPLBLD CKD-EPI 2021: 124.5 ML/MIN/1.73
EOSINOPHIL # BLD AUTO: 0.14 10*3/MM3 (ref 0–0.4)
EOSINOPHIL NFR BLD AUTO: 2.3 % (ref 0.3–6.2)
ERYTHROCYTE [DISTWIDTH] IN BLOOD BY AUTOMATED COUNT: 12.4 % (ref 12.3–15.4)
GLOBULIN UR ELPH-MCNC: 2.6 GM/DL
GLUCOSE SERPL-MCNC: 91 MG/DL (ref 65–99)
HCT VFR BLD AUTO: 44.6 % (ref 34–46.6)
HDLC SERPL-MCNC: 41 MG/DL (ref 40–60)
HGB BLD-MCNC: 15.6 G/DL (ref 12–15.9)
IMM GRANULOCYTES # BLD AUTO: 0.02 10*3/MM3 (ref 0–0.05)
IMM GRANULOCYTES NFR BLD AUTO: 0.3 % (ref 0–0.5)
LDLC SERPL CALC-MCNC: 129 MG/DL (ref 0–100)
LDLC/HDLC SERPL: 3.14 {RATIO}
LYMPHOCYTES # BLD AUTO: 1.61 10*3/MM3 (ref 0.7–3.1)
LYMPHOCYTES NFR BLD AUTO: 26.4 % (ref 19.6–45.3)
MCH RBC QN AUTO: 30.2 PG (ref 26.6–33)
MCHC RBC AUTO-ENTMCNC: 35 G/DL (ref 31.5–35.7)
MCV RBC AUTO: 86.4 FL (ref 79–97)
MONOCYTES # BLD AUTO: 0.4 10*3/MM3 (ref 0.1–0.9)
MONOCYTES NFR BLD AUTO: 6.5 % (ref 5–12)
NEUTROPHILS NFR BLD AUTO: 3.91 10*3/MM3 (ref 1.7–7)
NEUTROPHILS NFR BLD AUTO: 64 % (ref 42.7–76)
NRBC BLD AUTO-RTO: 0 /100 WBC (ref 0–0.2)
PLATELET # BLD AUTO: 204 10*3/MM3 (ref 140–450)
PMV BLD AUTO: 10.7 FL (ref 6–12)
POTASSIUM SERPL-SCNC: 4.2 MMOL/L (ref 3.5–5.2)
PROT SERPL-MCNC: 7 G/DL (ref 6–8.5)
RBC # BLD AUTO: 5.16 10*6/MM3 (ref 3.77–5.28)
SODIUM SERPL-SCNC: 136 MMOL/L (ref 136–145)
TRIGL SERPL-MCNC: 62 MG/DL (ref 0–150)
TSH SERPL DL<=0.05 MIU/L-ACNC: 1.04 UIU/ML (ref 0.27–4.2)
VLDLC SERPL-MCNC: 12 MG/DL (ref 5–40)
WBC NRBC COR # BLD AUTO: 6.11 10*3/MM3 (ref 3.4–10.8)

## 2025-02-10 PROCEDURE — 84443 ASSAY THYROID STIM HORMONE: CPT | Performed by: PHYSICIAN ASSISTANT

## 2025-02-10 PROCEDURE — 1126F AMNT PAIN NOTED NONE PRSNT: CPT | Performed by: PHYSICIAN ASSISTANT

## 2025-02-10 PROCEDURE — 80061 LIPID PANEL: CPT | Performed by: PHYSICIAN ASSISTANT

## 2025-02-10 PROCEDURE — 99213 OFFICE O/P EST LOW 20 MIN: CPT | Performed by: PHYSICIAN ASSISTANT

## 2025-02-10 PROCEDURE — 80053 COMPREHEN METABOLIC PANEL: CPT | Performed by: PHYSICIAN ASSISTANT

## 2025-02-10 PROCEDURE — 1160F RVW MEDS BY RX/DR IN RCRD: CPT | Performed by: PHYSICIAN ASSISTANT

## 2025-02-10 PROCEDURE — 1159F MED LIST DOCD IN RCRD: CPT | Performed by: PHYSICIAN ASSISTANT

## 2025-02-10 PROCEDURE — 85025 COMPLETE CBC W/AUTO DIFF WBC: CPT | Performed by: PHYSICIAN ASSISTANT

## 2025-02-10 NOTE — PROGRESS NOTES
"Chief Complaint  Neck Pain (Diagnosed with degenerate disc disease in her neck x 1 month /Pt says she feels like her neck feels uncomfortable daily and like something is in her throat )    Subjective          Raysa Ellis presents to [facility]    Dysphagia, fullness in neck- symptoms present over the past month.  She has tried acid reducer before without much improvement.  Patient denies URI symptoms.  Her symptoms do seem to be worse in the mornings.  Patient has tried acid reducers before which did not seem to make a huge difference.  She denies any recent URI illnesses.    Objective   Vital Signs:   /64 (BP Location: Left arm, Patient Position: Sitting, Cuff Size: Large Adult)   Pulse 93   Temp 97.4 °F (36.3 °C) (Infrared)   Ht 162.6 cm (64\")   Wt 78.9 kg (174 lb)   SpO2 99%   BMI 29.87 kg/m²     Physical Exam  Vitals reviewed.   Constitutional:       Appearance: Normal appearance. She is well-developed.   HENT:      Head: Normocephalic and atraumatic.      Right Ear: Tympanic membrane, ear canal and external ear normal.      Left Ear: Tympanic membrane, ear canal and external ear normal.      Nose: Nose normal.      Mouth/Throat:      Mouth: Mucous membranes are moist.      Pharynx: No posterior oropharyngeal erythema.   Eyes:      Conjunctiva/sclera: Conjunctivae normal.      Pupils: Pupils are equal, round, and reactive to light.   Cardiovascular:      Rate and Rhythm: Normal rate and regular rhythm.      Heart sounds: No murmur heard.     No friction rub. No gallop.   Pulmonary:      Effort: Pulmonary effort is normal.      Breath sounds: Normal breath sounds. No wheezing or rhonchi.   Lymphadenopathy:      Cervical: Cervical adenopathy (Bilateral anterior, L > R) present.   Skin:     General: Skin is warm and dry.   Neurological:      Mental Status: She is alert and oriented to person, place, and time.      Cranial Nerves: No cranial nerve " deficit.   Psychiatric:         Mood and Affect: Mood and affect normal.         Behavior: Behavior normal.         Thought Content: Thought content normal.         Judgment: Judgment normal.        Result Review :          Procedures      Assessment and Plan    Diagnoses and all orders for this visit:    1. Lymphadenopathy (Primary)  Assessment & Plan:  Fullness in anterior cervical neck, will check ultrasound for possible lymphadenopathy.  Will get basic blood work today to assess thyroid function.  Also discussed a trial of Pepcid twice daily over the next month.  Patient to follow-up with PCP in 1 month for reevaluation.  If symptoms persist or worsen may need to consider further imaging such as CT of neck.    Orders:  -     US Head Neck Soft Tissue  -     CBC w AUTO Differential  -     Comprehensive metabolic panel  -     TSH    2. Palpitations  -     Lipid Panel              Follow Up   No follow-ups on file.  Patient was given instructions and counseling regarding her condition or for health maintenance advice. Please see specific information pulled into the AVS if appropriate.

## 2025-02-11 NOTE — ASSESSMENT & PLAN NOTE
Fullness in anterior cervical neck, will check ultrasound for possible lymphadenopathy.  Will get basic blood work today to assess thyroid function.  Also discussed a trial of Pepcid twice daily over the next month.  Patient to follow-up with PCP in 1 month for reevaluation.  If symptoms persist or worsen may need to consider further imaging such as CT of neck.

## 2025-02-17 ENCOUNTER — HOSPITAL ENCOUNTER (OUTPATIENT)
Dept: ULTRASOUND IMAGING | Facility: HOSPITAL | Age: 30
Discharge: HOME | End: 2025-02-17
Admitting: PHYSICIAN ASSISTANT
Payer: COMMERCIAL

## 2025-02-17 PROCEDURE — 76536 US EXAM OF HEAD AND NECK: CPT

## 2025-02-24 DIAGNOSIS — R59.1 LYMPHADENOPATHY: Primary | ICD-10-CM

## 2025-03-07 ENCOUNTER — OFFICE VISIT (OUTPATIENT)
Dept: INTERNAL MEDICINE | Facility: CLINIC | Age: 30
End: 2025-03-07
Payer: COMMERCIAL

## 2025-03-07 VITALS
RESPIRATION RATE: 18 BRPM | BODY MASS INDEX: 30.38 KG/M2 | WEIGHT: 177 LBS | TEMPERATURE: 98.3 F | SYSTOLIC BLOOD PRESSURE: 110 MMHG | OXYGEN SATURATION: 98 % | DIASTOLIC BLOOD PRESSURE: 78 MMHG | HEART RATE: 104 BPM

## 2025-03-07 DIAGNOSIS — R59.1 LYMPHADENOPATHY: ICD-10-CM

## 2025-03-07 DIAGNOSIS — H72.91 ACUTE OTITIS MEDIA OF RIGHT EAR WITH PERFORATED TYMPANIC MEMBRANE: ICD-10-CM

## 2025-03-07 DIAGNOSIS — H66.91 ACUTE OTITIS MEDIA OF RIGHT EAR WITH PERFORATED TYMPANIC MEMBRANE: ICD-10-CM

## 2025-03-07 DIAGNOSIS — H91.91 HEARING LOSS OF RIGHT EAR, UNSPECIFIED HEARING LOSS TYPE: Primary | ICD-10-CM

## 2025-03-07 PROCEDURE — 1159F MED LIST DOCD IN RCRD: CPT | Performed by: PHYSICIAN ASSISTANT

## 2025-03-07 PROCEDURE — 99213 OFFICE O/P EST LOW 20 MIN: CPT | Performed by: PHYSICIAN ASSISTANT

## 2025-03-07 PROCEDURE — 1126F AMNT PAIN NOTED NONE PRSNT: CPT | Performed by: PHYSICIAN ASSISTANT

## 2025-03-07 PROCEDURE — 1160F RVW MEDS BY RX/DR IN RCRD: CPT | Performed by: PHYSICIAN ASSISTANT

## 2025-03-07 RX ORDER — AMOXICILLIN 875 MG/1
875 TABLET, COATED ORAL DAILY
COMMUNITY
Start: 2025-02-18

## 2025-03-07 RX ORDER — FLUTICASONE PROPIONATE 50 MCG
2 SPRAY, SUSPENSION (ML) NASAL DAILY
COMMUNITY
Start: 2025-03-03

## 2025-03-07 RX ORDER — CETIRIZINE HYDROCHLORIDE 10 MG/1
10 TABLET ORAL DAILY
COMMUNITY
Start: 2025-02-18

## 2025-03-07 NOTE — PROGRESS NOTES
Chief Complaint  Swollen Glands and Palpitations    Subjective          Raysa Ellis presents to [facility]  Swollen Glands  Symptoms include swollen glands.    Palpitations       Pt here for f/u on neck swelling  Has still felt swelling on R side of neck   Had Us showing Lad  Has had pain in R ear   Denies fever   Denies runny nose, nasal congestion, fever   Denies pain with swallow. No food getting stuck when swallowing  She couldn't tell a difference with pepcid  Pt was seen at  and given amoxicillin, flonase, and afrin for OM.  Pt has had ear drainage, has seen pus with odor    Palpitations: improved with propanolol   Denies chest pain, dizziness    Past Medical History:   Diagnosis Date    Abnormal Pap smear of cervix     Asymptomatic varicose veins of both lower extremities 10/07/2021    Current severe episode of major depressive disorder without psychotic features without prior episode 02/10/2022    Delivery by emergency  10/08/2019    Ectopic pregnancy     Generalized anxiety disorder 02/10/2022    HPV (human papilloma virus) infection     Migraine headache 10/29/2021    Nevus, non-neoplastic 2022    Other specified soft tissue disorders 2022    Rapid heart rate     24 HAD EPISODE OF RHR W/NEAR SYNCOPY, ON BB, SEETIEN [place], LAST EPISODE 2024 NO CP OR SOA    S/P excision of ganglion cyst     Spider veins     Varicose veins of unspecified lower extremity with pain 07/15/2020    Venous insufficiency (chronic) (peripheral) 07/15/2020    NO CURRENT ISSUES        Past Surgical History:   Procedure Laterality Date     SECTION  2019    COLPOSCOPY  2024    ENDOVENOUS ABLATION SAPHENOUS VEIN W/ LASER  2023    SALPINGECTOMY Left 2020    R/T ECTOPIC PREG    WRIST GANGLION EXCISION Left 2018        Current Outpatient Medications on File Prior to Visit   Medication Sig Dispense Refill    amoxicillin (AMOXIL) 875 MG  tablet Take 1 tablet by mouth Daily.      cetirizine (zyrTEC) 10 MG tablet Take 1 tablet by mouth Daily.      cyclobenzaprine (FLEXERIL) 5 MG tablet Take 1 tablet by mouth 3 (Three) Times a Day As Needed for Muscle Spasms. May take 1/2-1 full tablet 3 times daily as needed 30 tablet 0    fluticasone (FLONASE) 50 MCG/ACT nasal spray Administer 2 sprays into the nostril(s) as directed by provider Daily.      naproxen (Naprosyn) 500 MG tablet Take 1 tablet by mouth 2 (Two) Times a Day With Meals. 60 tablet 0    norethindrone-ethinyl estradiol FE (Junel FE 1/20) 1-20 MG-MCG per tablet Take 1 tablet by mouth Daily. 84 tablet 3    propranolol (INDERAL) 10 MG tablet Take 1 tablet by mouth 3 (Three) Times a Day. (Patient taking differently: Take 1 tablet by mouth 2 (Two) Times a Day. TAKES AT 12 AND 6) 90 tablet 6     No current facility-administered medications on file prior to visit.        No Known Allergies    Social History     Tobacco Use   Smoking Status Former    Current packs/day: 0.00    Average packs/day: 0.5 packs/day for 5.0 years (2.5 ttl pk-yrs)    Types: Cigarettes    Start date:     Quit date: 2018    Years since quittin.1   Smokeless Tobacco Never          Objective   Vital Signs:   /78 (BP Location: Left arm, Patient Position: Sitting, Cuff Size: Adult)   Pulse 104   Temp 98.3 °F (36.8 °C) (Temporal)   Resp 18   Wt 80.3 kg (177 lb)   SpO2 98%   BMI 30.38 kg/m²     Physical Exam  Vitals reviewed.   Constitutional:       Appearance: Normal appearance.   HENT:      Head: Normocephalic and atraumatic.      Right Ear: A middle ear effusion (yellow pus noted) is present.      Nose: Nose normal.      Mouth/Throat:      Mouth: Mucous membranes are moist.   Eyes:      Extraocular Movements: Extraocular movements intact.      Conjunctiva/sclera: Conjunctivae normal.      Pupils: Pupils are equal, round, and reactive to light.   Cardiovascular:      Rate and Rhythm: Normal rate and regular  rhythm.   Pulmonary:      Effort: Pulmonary effort is normal.      Breath sounds: Normal breath sounds.   Abdominal:      General: Abdomen is flat. Bowel sounds are normal.      Palpations: Abdomen is soft.   Musculoskeletal:         General: Normal range of motion.   Neurological:      General: No focal deficit present.      Mental Status: She is alert and oriented to person, place, and time.   Psychiatric:         Mood and Affect: Mood normal.        Result Review :                 Assessment and Plan    Diagnoses and all orders for this visit:    1. Hearing loss of right ear, unspecified hearing loss type (Primary)  Comments:  Will refer to ENT for eval. hearing loss likely secondary to exudate. Pt will let us know if sx worsen/change. Encoruaged to d/c afrin  Orders:  -     Ambulatory Referral to ENT (Otolaryngology)    2. Acute otitis media of right ear with perforated tympanic membrane  -     Ambulatory Referral to ENT (Otolaryngology)    3. Lymphadenopathy  Comments:  reviewed US and labs. will postpone repeat US because LAD expected in setting of ear infection        Follow Up   Return if symptoms worsen or fail to improve.  Patient was given instructions and counseling regarding her condition or for health maintenance advice. Please see specific information pulled into the AVS if appropriate.

## 2025-03-27 NOTE — PRE-PROCEDURE INSTRUCTIONS
PATIENT INSTRUCTED TO BE:    - NOTHING TO EAT AFTER MIDNIGHT OR CHEW, EXCEPT CAN HAVE CLEAR LIQUIDS 2 HOURS PRIOR TO SURGERY ARRIVAL TIME , NO MORE THAN 8 OZ. (NOTHING RED)     - TO HOLD ALL VITAMINS, SUPPLEMENTS, NSAIDS FOR ONE WEEK PRIOR TO THEIR SURGICAL PROCEDURE        - BATHING INSTRUCTIONS GIVEN    INSTRUCTED NO LOTIONS, JEWELRY, PIERCINGS,  NAIL POLISH, OR DEODORANT DAY OF SURGERY        -INSTRUCTED TO TAKE THE FOLLOWING MEDICATIONS THE DAY OF SURGERY WITH SIPS OF WATER:   Propranolol        - DO NOT BRING ANY MEDICATIONS WITH YOU TO THE HOSPITAL THE DAY OF SURGERY, EXCEPT IF USE INHALERS. BRING INHALERS DAY OF SURGERY       - BRING CPAP OR BIPAP TO THE HOSPITAL ONLY IF YOU ARE SPENDING THE NIGHT    - DO NOT SMOKE OR VAPE 24 HOURS PRIOR TO PROCEDURE PER ANESTHESIA REQUEST     -MAKE SURE YOU HAVE A RIDE HOME OR SOMEONE TO STAY WITH YOU THE DAY OF THE PROCEDURE AFTER YOU GO HOME     - FOLLOW ANY OTHER INSTRUCTIONS GIVEN TO YOU BY YOUR SURGEON'S OFFICE.     COME TO [facility], FIRST FLOOR. CHECK IN AT THE DESK FOR REGISTRATION/SURGERY    - YOU WILL RECEIVE A PHONE CALL THE DAY PRIOR TO SURGERY BETWEEN 1PM AND 4 PM WITH ARRIVAL TIME, IF YOUR SURGERY IS ON A MONDAY YOU WILL RECEIVE A CALL THE FRIDAY PRIOR TO SURGERY DATE    - BRING CASH OR CREDIT CARD FOR COPAYMENT OF MEDICATIONS AFTER SURGERY IF YOU USE THE HOSPITAL PHARMACY (MEDS TO BED)    - PREADMISSION TESTING NURSE JENIFER MCGARRY [phone] IF HAVE ANY QUESTIONS     -PATIENT PROVIDED THE NUMBER FOR PREOP SURGICAL DEPT IF HAD QUESTIONS AFTER HOURS PRIOR TO SURGERY ([phone]).  INFORMED PT IF NO ANSWER, LEAVE A MESSAGE AND SOMEONE WILL RETURN THEIR CALL       PATIENT VERBALIZED UNDERSTANDING

## 2025-03-28 PROCEDURE — S0260 H&P FOR SURGERY: HCPCS | Performed by: OBSTETRICS & GYNECOLOGY

## 2025-03-29 NOTE — H&P
[facility]   HISTORY AND PHYSICAL    Patient Name:Raysa Ellis  : 1995  MRN: [id number]  Primary Care Physician: Louise Yeager PA-C  Date of admission: (Not on file)    Subjective   Subjective     Chief Complaint: Here for surgery    History of Present Illness   Raysa Ellis is a 29 y.o. female  here for conization of the cervix.   Pap was ASCUS HPV positive.  Cervical biopsies: ECC positive for JILL-1 with cervical biopsies JILL-1 and JILL-2     Review of Systems   Constitutional: Negative.    HENT: Negative.     Eyes: Negative.    Respiratory: Negative.     Cardiovascular: Negative.    Gastrointestinal: Negative.    Endocrine: Negative.    Genitourinary: Negative.    Musculoskeletal: Negative.    Skin: Negative.    Allergic/Immunologic: Negative.    Neurological: Negative.    Hematological: Negative.    Psychiatric/Behavioral: Negative.           Personal History     Past Medical History:   Diagnosis Date    Abnormal Pap smear of cervix     Asymptomatic varicose veins of both lower extremities 10/07/2021    Current severe episode of major depressive disorder without psychotic features without prior episode 02/10/2022    Delivery by emergency  10/08/2019    Ectopic pregnancy     Generalized anxiety disorder 02/10/2022    HPV (human papilloma virus) infection     Migraine headache 10/29/2021    Nevus, non-neoplastic 2022    Other specified soft tissue disorders 2022    Rapid heart rate     follows with Dr. KAPADIA,    S/P excision of ganglion cyst 2017    Spider veins     Varicose veins of unspecified lower extremity with pain 07/15/2020    Venous insufficiency (chronic) (peripheral) 07/15/2020    NO CURRENT ISSUES       Past Surgical History:   Procedure Laterality Date     SECTION  2019    COLPOSCOPY  2024    ENDOVENOUS ABLATION SAPHENOUS VEIN W/ LASER  2023    SALPINGECTOMY Left 2020    R/T ECTOPIC PREG    WRIST GANGLION EXCISION Left 2018        Family History: Her family history includes Asthma in her mother; Hypertension in her maternal grandmother; Sleep apnea in her mother.     Social History: She  reports that she quit smoking about 7 years ago. Her smoking use included cigarettes. She started smoking about 12 years ago. She has a 2.5 pack-year smoking history. She has never used smokeless tobacco. She reports that she does not drink alcohol and does not use drugs.    Home Medications:  cetirizine, cyclobenzaprine, fluticasone, naproxen, norethindrone-ethinyl estradiol FE, and propranolol    Allergies:  She has no known allergies.    Objective    Objective     Vitals:         Physical Exam  Constitutional:       Appearance: Normal appearance.   HENT:      Head: Normocephalic.   Cardiovascular:      Rate and Rhythm: Normal rate and regular rhythm.      Pulses: Normal pulses.      Heart sounds: Normal heart sounds.   Pulmonary:      Effort: Pulmonary effort is normal.      Breath sounds: Normal breath sounds.   Abdominal:      General: Bowel sounds are normal.      Palpations: Abdomen is soft.   Genitourinary:     Comments: Nml female external genitalia.  Cervix is parous. Uterus axial normal size shape and consistency.  No adnexal masses are appreciated    Musculoskeletal:         General: Normal range of motion.      Cervical back: Normal range of motion.   Skin:     General: Skin is warm and dry.   Neurological:      General: No focal deficit present.      Mental Status: She is alert and oriented to person, place, and time.   Psychiatric:         Mood and Affect: Mood normal.         Behavior: Behavior normal.          Result Review    Result Review:  I have personally reviewed the results from the time of this admission to 3/28/2025 20:39 EDT and agree with these findings:  []  Laboratory list / accordion  []  Microbiology  []  Radiology  []  EKG/Telemetry   []  Cardiology/Vascular   []  Pathology  []  Old records  []  Other:  Most notable  findings include:        Assessment & Plan   Assessment / Plan     Brief Patient Summary:  Raysa Ellis is a 29 y.o. female here for conization of the cervix    Active Hospital Problems:  Active Hospital Problems    Diagnosis     Moderate dysplasia of cervix (JILL II)     Unwanted fertility      Plan:   Cervical conization:After the appropriate consents have been obtained, the patient was counseled to the risks and benefits of this procedure to include infection, bleeding, damage to internal organs, bowels, bladder, blood vessels, other internal organs requiring additional surgery to repair or remove any damaged structures. The patient was given the opportunity to ask questions and her questions were answered to her satisfaction.     VTE Prophylaxis:  Mechanical VTE prophylaxis orders are signed & held.          Cheli Cho, DO

## 2025-04-01 ENCOUNTER — ANESTHESIA (OUTPATIENT)
Dept: PERIOP | Facility: HOSPITAL | Age: 30
End: 2025-04-01
Payer: COMMERCIAL

## 2025-04-01 ENCOUNTER — HOSPITAL ENCOUNTER (OUTPATIENT)
Facility: HOSPITAL | Age: 30
Setting detail: HOSPITAL OUTPATIENT SURGERY
Discharge: HOME | End: 2025-04-01
Attending: OBSTETRICS & GYNECOLOGY | Admitting: OBSTETRICS & GYNECOLOGY
Payer: COMMERCIAL

## 2025-04-01 ENCOUNTER — ANESTHESIA EVENT (OUTPATIENT)
Dept: PERIOP | Facility: HOSPITAL | Age: 30
End: 2025-04-01
Payer: COMMERCIAL

## 2025-04-01 VITALS
HEART RATE: 72 BPM | SYSTOLIC BLOOD PRESSURE: 94 MMHG | DIASTOLIC BLOOD PRESSURE: 74 MMHG | BODY MASS INDEX: 29.62 KG/M2 | TEMPERATURE: 97.1 F | OXYGEN SATURATION: 99 % | WEIGHT: 173.5 LBS | HEIGHT: 64 IN | RESPIRATION RATE: 21 BRPM

## 2025-04-01 DIAGNOSIS — Z30.09 UNWANTED FERTILITY: ICD-10-CM

## 2025-04-01 DIAGNOSIS — N87.1 MODERATE DYSPLASIA OF CERVIX (CIN II): ICD-10-CM

## 2025-04-01 LAB
BASOPHILS # BLD AUTO: 0.03 10*3/MM3 (ref 0–0.2)
BASOPHILS NFR BLD AUTO: 0.5 % (ref 0–1.5)
DEPRECATED RDW RBC AUTO: 37.1 FL (ref 37–54)
EOSINOPHIL # BLD AUTO: 0.16 10*3/MM3 (ref 0–0.4)
EOSINOPHIL NFR BLD AUTO: 2.7 % (ref 0.3–6.2)
ERYTHROCYTE [DISTWIDTH] IN BLOOD BY AUTOMATED COUNT: 12.2 % (ref 12.3–15.4)
HCG SERPL QL: NEGATIVE
HCT VFR BLD AUTO: 43.5 % (ref 34–46.6)
HGB BLD-MCNC: 15.4 G/DL (ref 12–15.9)
IMM GRANULOCYTES # BLD AUTO: 0.01 10*3/MM3 (ref 0–0.05)
IMM GRANULOCYTES NFR BLD AUTO: 0.2 % (ref 0–0.5)
LYMPHOCYTES # BLD AUTO: 1.51 10*3/MM3 (ref 0.7–3.1)
LYMPHOCYTES NFR BLD AUTO: 25.3 % (ref 19.6–45.3)
MCH RBC QN AUTO: 29.4 PG (ref 26.6–33)
MCHC RBC AUTO-ENTMCNC: 35.4 G/DL (ref 31.5–35.7)
MCV RBC AUTO: 83.2 FL (ref 79–97)
MONOCYTES # BLD AUTO: 0.34 10*3/MM3 (ref 0.1–0.9)
MONOCYTES NFR BLD AUTO: 5.7 % (ref 5–12)
NEUTROPHILS NFR BLD AUTO: 3.91 10*3/MM3 (ref 1.7–7)
NEUTROPHILS NFR BLD AUTO: 65.6 % (ref 42.7–76)
NRBC BLD AUTO-RTO: 0 /100 WBC (ref 0–0.2)
PLATELET # BLD AUTO: 203 10*3/MM3 (ref 140–450)
PMV BLD AUTO: 10.1 FL (ref 6–12)
RBC # BLD AUTO: 5.23 10*6/MM3 (ref 3.77–5.28)
WBC NRBC COR # BLD AUTO: 5.96 10*3/MM3 (ref 3.4–10.8)

## 2025-04-01 PROCEDURE — 25010000002 MIDAZOLAM PER 1MG: Performed by: ANESTHESIOLOGY

## 2025-04-01 PROCEDURE — 25010000002 VASOPRESSIN 20 UNIT/ML SOLUTION 1 ML VIAL: Performed by: OBSTETRICS & GYNECOLOGY

## 2025-04-01 PROCEDURE — 84703 CHORIONIC GONADOTROPIN ASSAY: CPT | Performed by: OBSTETRICS & GYNECOLOGY

## 2025-04-01 PROCEDURE — 88305 TISSUE EXAM BY PATHOLOGIST: CPT | Performed by: OBSTETRICS & GYNECOLOGY

## 2025-04-01 PROCEDURE — 25010000002 LIDOCAINE PF 2% 2 % SOLUTION: Performed by: NURSE ANESTHETIST, CERTIFIED REGISTERED

## 2025-04-01 PROCEDURE — 88307 TISSUE EXAM BY PATHOLOGIST: CPT | Performed by: OBSTETRICS & GYNECOLOGY

## 2025-04-01 PROCEDURE — 25010000002 KETOROLAC TROMETHAMINE PER 15 MG: Performed by: NURSE ANESTHETIST, CERTIFIED REGISTERED

## 2025-04-01 PROCEDURE — 85025 COMPLETE CBC W/AUTO DIFF WBC: CPT | Performed by: OBSTETRICS & GYNECOLOGY

## 2025-04-01 PROCEDURE — 25010000002 GLYCOPYRROLATE 0.2 MG/ML SOLUTION: Performed by: NURSE ANESTHETIST, CERTIFIED REGISTERED

## 2025-04-01 PROCEDURE — 57520 CONIZATION OF CERVIX: CPT | Performed by: OBSTETRICS & GYNECOLOGY

## 2025-04-01 PROCEDURE — 25810000003 LACTATED RINGERS PER 1000 ML: Performed by: ANESTHESIOLOGY

## 2025-04-01 PROCEDURE — 25010000002 PROPOFOL 10 MG/ML EMULSION: Performed by: NURSE ANESTHETIST, CERTIFIED REGISTERED

## 2025-04-01 RX ORDER — ONDANSETRON 2 MG/ML
4 INJECTION INTRAMUSCULAR; INTRAVENOUS ONCE AS NEEDED
Status: DISCONTINUED | OUTPATIENT
Start: 2025-04-01 | End: 2025-04-01 | Stop reason: HOSPADM

## 2025-04-01 RX ORDER — PROMETHAZINE HYDROCHLORIDE 12.5 MG/1
25 TABLET ORAL ONCE AS NEEDED
Status: DISCONTINUED | OUTPATIENT
Start: 2025-04-01 | End: 2025-04-01 | Stop reason: HOSPADM

## 2025-04-01 RX ORDER — IBUPROFEN 600 MG/1
600 TABLET, FILM COATED ORAL EVERY 6 HOURS PRN
Status: DISCONTINUED | OUTPATIENT
Start: 2025-04-01 | End: 2025-04-01 | Stop reason: HOSPADM

## 2025-04-01 RX ORDER — ACETAMINOPHEN 500 MG
1000 TABLET ORAL ONCE
Status: COMPLETED | OUTPATIENT
Start: 2025-04-01 | End: 2025-04-01

## 2025-04-01 RX ORDER — DEXMEDETOMIDINE HYDROCHLORIDE 100 UG/ML
INJECTION, SOLUTION INTRAVENOUS AS NEEDED
Status: DISCONTINUED | OUTPATIENT
Start: 2025-04-01 | End: 2025-04-01 | Stop reason: SURG

## 2025-04-01 RX ORDER — OXYCODONE HYDROCHLORIDE 5 MG/1
5 TABLET ORAL
Refills: 0 | Status: DISCONTINUED | OUTPATIENT
Start: 2025-04-01 | End: 2025-04-01 | Stop reason: HOSPADM

## 2025-04-01 RX ORDER — IBUPROFEN 600 MG/1
600 TABLET, FILM COATED ORAL EVERY 6 HOURS PRN
Qty: 30 TABLET | Refills: 0 | Status: SHIPPED | OUTPATIENT
Start: 2025-04-01

## 2025-04-01 RX ORDER — PROMETHAZINE HYDROCHLORIDE 25 MG/1
25 SUPPOSITORY RECTAL ONCE AS NEEDED
Status: DISCONTINUED | OUTPATIENT
Start: 2025-04-01 | End: 2025-04-01 | Stop reason: HOSPADM

## 2025-04-01 RX ORDER — ACETAMINOPHEN 325 MG/1
650 TABLET ORAL EVERY 6 HOURS PRN
Qty: 30 TABLET | Refills: 0 | Status: SHIPPED | OUTPATIENT
Start: 2025-04-01

## 2025-04-01 RX ORDER — MAGNESIUM HYDROXIDE 1200 MG/15ML
LIQUID ORAL AS NEEDED
Status: DISCONTINUED | OUTPATIENT
Start: 2025-04-01 | End: 2025-04-01 | Stop reason: HOSPADM

## 2025-04-01 RX ORDER — LIDOCAINE HYDROCHLORIDE 20 MG/ML
INJECTION, SOLUTION EPIDURAL; INFILTRATION; INTRACAUDAL; PERINEURAL AS NEEDED
Status: DISCONTINUED | OUTPATIENT
Start: 2025-04-01 | End: 2025-04-01 | Stop reason: SURG

## 2025-04-01 RX ORDER — ONDANSETRON 4 MG/1
4 TABLET, ORALLY DISINTEGRATING ORAL ONCE AS NEEDED
Status: DISCONTINUED | OUTPATIENT
Start: 2025-04-01 | End: 2025-04-01 | Stop reason: HOSPADM

## 2025-04-01 RX ORDER — PROPOFOL 10 MG/ML
VIAL (ML) INTRAVENOUS AS NEEDED
Status: DISCONTINUED | OUTPATIENT
Start: 2025-04-01 | End: 2025-04-01 | Stop reason: SURG

## 2025-04-01 RX ORDER — GLYCOPYRROLATE 0.2 MG/ML
INJECTION INTRAMUSCULAR; INTRAVENOUS AS NEEDED
Status: DISCONTINUED | OUTPATIENT
Start: 2025-04-01 | End: 2025-04-01 | Stop reason: SURG

## 2025-04-01 RX ORDER — ACETIC ACID 5 %
LIQUID (ML) MISCELLANEOUS AS NEEDED
Status: DISCONTINUED | OUTPATIENT
Start: 2025-04-01 | End: 2025-04-01 | Stop reason: HOSPADM

## 2025-04-01 RX ORDER — SCOPOLAMINE 1 MG/3D
1 PATCH, EXTENDED RELEASE TRANSDERMAL ONCE
Status: DISCONTINUED | OUTPATIENT
Start: 2025-04-01 | End: 2025-04-01 | Stop reason: HOSPADM

## 2025-04-01 RX ORDER — KETOROLAC TROMETHAMINE 15 MG/ML
INJECTION, SOLUTION INTRAMUSCULAR; INTRAVENOUS AS NEEDED
Status: DISCONTINUED | OUTPATIENT
Start: 2025-04-01 | End: 2025-04-01 | Stop reason: SURG

## 2025-04-01 RX ORDER — SODIUM CHLORIDE, SODIUM LACTATE, POTASSIUM CHLORIDE, CALCIUM CHLORIDE 600; 310; 30; 20 MG/100ML; MG/100ML; MG/100ML; MG/100ML
9 INJECTION, SOLUTION INTRAVENOUS CONTINUOUS PRN
Status: DISCONTINUED | OUTPATIENT
Start: 2025-04-01 | End: 2025-04-01 | Stop reason: HOSPADM

## 2025-04-01 RX ORDER — TRAMADOL HYDROCHLORIDE 50 MG/1
50 TABLET ORAL EVERY 8 HOURS PRN
Qty: 4 TABLET | Refills: 0 | Status: SHIPPED | OUTPATIENT
Start: 2025-04-01

## 2025-04-01 RX ORDER — MIDAZOLAM HYDROCHLORIDE 2 MG/2ML
2 INJECTION, SOLUTION INTRAMUSCULAR; INTRAVENOUS ONCE
Status: COMPLETED | OUTPATIENT
Start: 2025-04-01 | End: 2025-04-01

## 2025-04-01 RX ADMIN — PROPOFOL 30 MG: 10 INJECTION, EMULSION INTRAVENOUS at 11:17

## 2025-04-01 RX ADMIN — GLYCOPYRROLATE 0.2 MG: 0.2 INJECTION INTRAMUSCULAR; INTRAVENOUS at 11:18

## 2025-04-01 RX ADMIN — ACETAMINOPHEN 1000 MG: 500 TABLET ORAL at 10:42

## 2025-04-01 RX ADMIN — MIDAZOLAM HYDROCHLORIDE 2 MG: 1 INJECTION, SOLUTION INTRAMUSCULAR; INTRAVENOUS at 10:47

## 2025-04-01 RX ADMIN — SODIUM CHLORIDE, POTASSIUM CHLORIDE, SODIUM LACTATE AND CALCIUM CHLORIDE: 600; 310; 30; 20 INJECTION, SOLUTION INTRAVENOUS at 10:58

## 2025-04-01 RX ADMIN — DEXMEDETOMIDINE 10 MCG: 100 INJECTION, SOLUTION, CONCENTRATE INTRAVENOUS at 11:21

## 2025-04-01 RX ADMIN — PROPOFOL 250 MCG/KG/MIN: 10 INJECTION, EMULSION INTRAVENOUS at 11:03

## 2025-04-01 RX ADMIN — DEXMEDETOMIDINE 10 MCG: 100 INJECTION, SOLUTION, CONCENTRATE INTRAVENOUS at 11:13

## 2025-04-01 RX ADMIN — KETOROLAC TROMETHAMINE 30 MG: 15 INJECTION, SOLUTION INTRAMUSCULAR; INTRAVENOUS at 11:33

## 2025-04-01 RX ADMIN — SCOLOPAMINE TRANSDERMAL SYSTEM 1 PATCH: 1 PATCH, EXTENDED RELEASE TRANSDERMAL at 10:43

## 2025-04-01 RX ADMIN — PROPOFOL 80 MG: 10 INJECTION, EMULSION INTRAVENOUS at 11:03

## 2025-04-01 RX ADMIN — LIDOCAINE HYDROCHLORIDE 100 MG: 20 INJECTION, SOLUTION INTRAVENOUS at 11:03

## 2025-04-01 RX ADMIN — DEXMEDETOMIDINE 10 MCG: 100 INJECTION, SOLUTION, CONCENTRATE INTRAVENOUS at 11:02

## 2025-04-01 NOTE — ANESTHESIA POSTPROCEDURE EVALUATION
Patient: Raysa Ellis    Procedure Summary       Date: 04/01/25 Room / Location: [facility] OR [address] [place] [address] OR [facility]    Anesthesia Start: 1058 Anesthesia Stop: 1142    Procedure: CERVICAL CONIZATION (Cervix) Diagnosis:       Moderate dysplasia of cervix (JILL II)      Unwanted fertility      (Moderate dysplasia of cervix (JILL II) [N87.1])      (Unwanted fertility [Z30.09])    Surgeons: Cheli Cho DO Provider: Rito Esparza MD    Anesthesia Type: general, MAC ASA Status: 1            Anesthesia Type: general, MAC    Vitals  Vitals Value Taken Time   /61 04/01/25 12:07   Temp 35.9 °C (96.7 °F) 04/01/25 11:41   Pulse 78 04/01/25 12:11   Resp 23 04/01/25 11:46   SpO2 99 % 04/01/25 12:11   Vitals shown include unfiled device data.        Post Anesthesia Care and Evaluation    Patient location during evaluation: bedside  Patient participation: complete - patient participated  Level of consciousness: awake    Airway patency: patent  PONV Status: none  Cardiovascular status: acceptable  Respiratory status: acceptable  Hydration status: acceptable

## 2025-04-01 NOTE — DISCHARGE INSTRUCTIONS
DISCHARGE INSTRUCTIONS  GYNECOLOGICAL  PROCEDURES      For your surgery you had:  General anesthesia (you may have a sore throat for the first 24 hours)  IV sedation  Local anesthesia  Monitored anesthesia care  You received a medicated patch for nausea prevention today (behind your ear). It is recommended that you remove it 24-48 hours post-operatively. It must be removed within 72 hours.  You received an anesthesia medication today that can cause hormonal forms of birth control to be ineffective. You should use a different form of birth control (to prevent pregnancy) for 7 days.   You may experience dizziness, drowsiness, or lightheadedness for several hours following surgery.  Do not stay alone today or tonight.  Limit your activity for 24 hours.  Resume your diet slowly.  Follow any special dietary instructions you may have been given by your doctor.  You should not drive or operate machinery, drink alcohol, or sign legally binding documents for 24 hours or while you are taking pain medication.  Last dose of pain medication was given at:  Tylenol at 1040am may start pain meds or tylenol after 240pm.  NOTIFY YOUR DOCTOR IF YOU EXPERIENCE ANY OF THE FOLLOWING:  Temperature greater than 101 degrees Fahrenheit  Shaking Chills  Redness or excessive drainage from incision  Nausea, vomiting and/or pain that is not controlled by prescribed medications  Increase in bleeding or bleeding that is excessive  Unable to urinate in 6 hours after surgery  If unable to reach your doctor, please go to the closest Emergency Room [x] Change pads as needed for bleeding     [x] You may resume intercourse and the use of tampons as your physician has instructed you.  [x] Nothing in the vagina for 5 weeks to include intercourse, douches, or tampons.  [x] Vaginal bleeding may be expected for several days with flow decreasing with time and never any heavier than a normal   period.  If you have an ablation, vaginal discharge is expected  after bleeding stops.   If you have foul smelling discharge, notify your physician.  Medications per physician instructions as indicated on Discharge Medication Information Sheet.  You should see Dr. lenz for follow-up care   on  As scheduled/ as needed  .  Phone number: [phone]     SPECIAL INSTRUCTIONS:          Otc stool softners as needed while taking pain meds

## 2025-04-01 NOTE — ANESTHESIA PREPROCEDURE EVALUATION
Anesthesia Evaluation     Patient summary reviewed and Nursing notes reviewed                Airway   Mallampati: I  TM distance: >3 FB  Neck ROM: full  No difficulty expected  Dental      Pulmonary - negative pulmonary ROS and normal exam    breath sounds clear to auscultation  Cardiovascular - negative cardio ROS and normal exam    Rhythm: regular  Rate: normal        Neuro/Psych  (+) headaches, psychiatric history Anxiety and Depression  GI/Hepatic/Renal/Endo    (+) GERD    Musculoskeletal (-) negative ROS    Abdominal    Substance History - negative use     OB/GYN negative ob/gyn ROS         Other      history of cancer                  Anesthesia Plan    ASA 1     general and MAC     intravenous induction     Anesthetic plan, risks, benefits, and alternatives have been provided, discussed and informed consent has been obtained with: patient.    CODE STATUS:

## 2025-04-01 NOTE — OP NOTE
CERVICAL CONIZATION  Procedure Report    Patient Name:  Raysa Ellis  YOB: 1995    Date of Surgery:  4/1/2025     Indications: Moderate cervical dysplasia    Pre-op Diagnosis:   Moderate cervical dysplasia       Postoperative diagnosis:  Moderate cervical dysplasia    Procedure/CPT® Codes:  No CPT Code Applied in Case Entry    Procedure(s):  CERVICAL CONIZATION    Staff:  Surgeon(s):  Cheli Cho DO         Anesthesia: General    Estimated Blood Loss: 10 mL    Implants:    Nothing was implanted during the procedure    Specimen:          Specimens       ID Source Type Tests Collected By Collected At Frozen?    A Cervix Tissue TISSUE PATHOLOGY EXAM   Cheli Cho, DO 4/1/25 1123     Description: portion of cervix    Comment: open @ 3 oclock,silk stitch @ 12 oclock,PDS stitch @ 6 oclock    B Endocervix Tissue TISSUE PATHOLOGY EXAM   Cheli Cho, DO 4/1/25 1124     Description: endocervical curettings                Findings: Normal cervix    Complications: None    Description of Procedure:After the appropriate consents had been obtained, the patient was taken to the operative suite where she was placed in supine position and underwent general endotracheal intubation.  After an adequate level of anesthesia been obtained, the patient was placed in the high lithotomy position and prepped and draped in usual sterile fashion.  A weighted speculum was placed in the posterior vaginal vault and a Kline retractor was placed in the anterior vaginal vault to view the cervix.  Lugol's solution was applied to the cervix to identify the extent of the lesion.  Dilute vasopressin was then injected into the cervix to help with intraoperative and postoperative bleeding.  Two stay sutures were placed at the 3 and 9 o'clock positions using a suture of 0 Vicryl.  The cervical portio was then incised with a scalpel outside of the lesion that had previously been identified.  A suture of 3-0 silk was placed  to identify the 12 o'clock position and 3-0 Prolene was placed to identify the 6 o'clock position.  The remainder of the cone biopsy was completed using a combination of curved Mayos and a scalpel.  The cone specimen was removed and opened at the 3 o'clock position.  The specimen was attached to a tongue blade using 2 18-gauge angiocaths and handed off the field.  An ECC was performed in a standard fashion and the specimen is handed off the field as endocervical curettings.  Hemostasis was obtained using electrocautery.  After adequate hemostasis had been obtained, astringent was applied to the cone bed.  The 2 stay sutures were tied across the midline.  The procedure was terminated and the speculum was removed from the vagina.  The patient was taken out of the high lithotomy position.  She was awakened, extubated and transferred to the recovery room in stable and satisfactory condition.  The sponge counts and instrument counts were verified as correct.          Cheli Cho,      Date: 4/1/2025  Time: 11:45 EDT

## 2025-04-02 ENCOUNTER — RESULTS FOLLOW-UP (OUTPATIENT)
Dept: PERIOP | Facility: HOSPITAL | Age: 30
End: 2025-04-02
Payer: COMMERCIAL

## 2025-04-03 NOTE — TELEPHONE ENCOUNTER
Spoke to patient and gave results. Patient aware to keep post op appointment and had no further questions/concerns at this time.

## 2025-04-08 ENCOUNTER — DOCUMENTATION (OUTPATIENT)
Dept: OBSTETRICS AND GYNECOLOGY | Age: 30
End: 2025-04-08
Payer: COMMERCIAL

## 2025-04-09 ENCOUNTER — TELEPHONE (OUTPATIENT)
Dept: OBSTETRICS AND GYNECOLOGY | Age: 30
End: 2025-04-09

## 2025-04-09 NOTE — PROGRESS NOTES
Patient called answering service with complaints of black discharge.  Returned patient’s call, she is one week post-op from a cervical cone biopsy.  Only having discharge when she wipes, black color, odor similar to old blood and occasionally fishy.  Denies fever.  Discussed likely normal healing of her cervix causing the discharge.  Will have staff reach out tomorrow morning to schedule patient a follow up tomorrow afternoon.  Bleeding and infection precautions reviewed.

## 2025-04-09 NOTE — TELEPHONE ENCOUNTER
By direction of  and ALBARO Bradford:   Office rcvd patient complaints she is having after bx with Dr. Cho.  Due to Dr. Cho off this week, called to let patient know ALBARO Bradford is offering to see her here today @ [address] office, just down the street from Dr. Bolanos office.  Rcvd patient;s voice mail - left message for patient to call us back.    HUB ok to read:  Can transfer to the office if no slots or pt can be added to [facility] if you have openings to add her in.    Thanks,  Ivet

## 2025-04-10 NOTE — TELEPHONE ENCOUNTER
Spoke to pt and she is rescheduled to 4/15/25 with corey cuenca to address her problems after the conization 4/1

## 2025-04-11 ENCOUNTER — OFFICE VISIT (OUTPATIENT)
Dept: INTERNAL MEDICINE | Facility: CLINIC | Age: 30
End: 2025-04-11
Payer: COMMERCIAL

## 2025-04-11 VITALS
OXYGEN SATURATION: 98 % | TEMPERATURE: 98.2 F | DIASTOLIC BLOOD PRESSURE: 64 MMHG | HEIGHT: 64 IN | RESPIRATION RATE: 16 BRPM | BODY MASS INDEX: 29.5 KG/M2 | SYSTOLIC BLOOD PRESSURE: 102 MMHG | WEIGHT: 172.8 LBS | HEART RATE: 110 BPM

## 2025-04-11 DIAGNOSIS — H69.90 DYSFUNCTION OF EUSTACHIAN TUBE, UNSPECIFIED LATERALITY: Primary | ICD-10-CM

## 2025-04-11 NOTE — PROGRESS NOTES
Chief Complaint  Swollen Glands and Otitis Media    Subjective          Raysa Ellis presents to [facility]  History of Present Illness  Pt here for follow up   Ear pain improved   She has seen ENT and allergy in [place], given Cipro drops and steroids orally  Hearing is now back to normal  She has to schedule f/u for hearing exam       Past Medical History:   Diagnosis Date    Abnormal Pap smear of cervix     Asymptomatic varicose veins of both lower extremities 10/07/2021    Current severe episode of major depressive disorder without psychotic features without prior episode 02/10/2022    Delivery by emergency  10/08/2019    Ectopic pregnancy     Generalized anxiety disorder 02/10/2022    HPV (human papilloma virus) infection     Migraine headache 10/29/2021    Nevus, non-neoplastic 2022    Other specified soft tissue disorders 2022    Rapid heart rate     follows with Dr. KAPADIA,    S/P excision of ganglion cyst     Spider veins     Varicose veins of unspecified lower extremity with pain 07/15/2020    Venous insufficiency (chronic) (peripheral) 07/15/2020    NO CURRENT ISSUES        Past Surgical History:   Procedure Laterality Date    CERVICAL CONIZATION N/A 2025    Procedure: CERVICAL CONIZATION;  Surgeon: Cheli Cho DO;  Location: [facility] OR [facility];  Service: Gynecology;  Laterality: N/A;     SECTION      COLPOSCOPY  2024    ENDOVENOUS ABLATION SAPHENOUS VEIN W/ LASER  2023    SALPINGECTOMY Left 2020    R/T ECTOPIC PREG    WRIST GANGLION EXCISION Left 2018        Current Outpatient Medications on File Prior to Visit   Medication Sig Dispense Refill    acetaminophen (TYLENOL) 325 MG tablet Take 2 tablets by mouth Every 6 (Six) Hours As Needed for Mild Pain or Moderate Pain. 30 tablet 0    cyclobenzaprine (FLEXERIL) 5 MG tablet Take 1 tablet by mouth 3 (Three) Times a Day As Needed for Muscle Spasms.  "May take 1/2-1 full tablet 3 times daily as needed 30 tablet 0    ibuprofen (ADVIL,MOTRIN) 600 MG tablet Take 1 tablet by mouth Every 6 (Six) Hours As Needed for Mild Pain. 30 tablet 0    norethindrone-ethinyl estradiol FE (Junel FE 1/20) 1-20 MG-MCG per tablet Take 1 tablet by mouth Daily. 84 tablet 3    propranolol (INDERAL) 10 MG tablet Take 1 tablet by mouth 3 (Three) Times a Day. (Patient taking differently: Take 1 tablet by mouth 2 (Two) Times a Day. TAKES AT 12 AND 6) 90 tablet 6    traMADol (ULTRAM) 50 MG tablet Take 1 tablet by mouth Every 8 (Eight) Hours As Needed for Moderate Pain or Severe Pain. 4 tablet 0     No current facility-administered medications on file prior to visit.        No Known Allergies    Social History     Tobacco Use   Smoking Status Former    Current packs/day: 0.00    Average packs/day: 0.5 packs/day for 5.0 years (2.5 ttl pk-yrs)    Types: Cigarettes    Start date:     Quit date:     Years since quittin.2   Smokeless Tobacco Never          Objective   Vital Signs:   /64 (BP Location: Left arm, Patient Position: Sitting, Cuff Size: Adult)   Pulse 110   Temp 98.2 °F (36.8 °C) (Temporal)   Resp 16   Ht 162.6 cm (64\")   Wt 78.4 kg (172 lb 12.8 oz)   SpO2 98%   BMI 29.66 kg/m²     Physical Exam  Vitals reviewed.   Constitutional:       Appearance: Normal appearance.   HENT:      Head: Normocephalic and atraumatic.      Nose: Nose normal.      Mouth/Throat:      Mouth: Mucous membranes are moist.   Eyes:      Extraocular Movements: Extraocular movements intact.      Conjunctiva/sclera: Conjunctivae normal.      Pupils: Pupils are equal, round, and reactive to light.   Cardiovascular:      Rate and Rhythm: Normal rate and regular rhythm.   Pulmonary:      Effort: Pulmonary effort is normal.      Breath sounds: Normal breath sounds.   Abdominal:      General: Abdomen is flat. Bowel sounds are normal.      Palpations: Abdomen is soft.   Musculoskeletal:         " General: Normal range of motion.   Neurological:      General: No focal deficit present.      Mental Status: She is alert and oriented to person, place, and time.   Psychiatric:         Mood and Affect: Mood normal.        Result Review :                   Assessment and Plan    Diagnoses and all orders for this visit:    1. Dysfunction of Eustachian tube, unspecified laterality (Primary)  Comments:  Will start Flonase for fluid behind TM.  Patient will schedule follow-up with ENT as scheduled.        Follow Up   Return in about 1 year (around 4/11/2026).  Patient was given instructions and counseling regarding her condition or for health maintenance advice. Please see specific information pulled into the AVS if appropriate.

## 2025-05-06 NOTE — PROGRESS NOTES
Post Operative Visit        Chief Complaint   Patient presents with    Post-op     CERVICAL CONIZATION     HPI:   Pain:  no  Vaginal bleeding:  no  Vaginal discharge:  no  Fever/chills:  no  Good appetite:  yes  Normal bladder function:  yes  Normal bowel function:  yes  Hot flashes: no    PHYSICAL EXAM:  /77   Pulse 98   Wt 82.1 kg (181 lb)   LMP 03/13/2025 (Exact Date)   Breastfeeding No   BMI 31.07 kg/m² PROM present   Chaperone present during breast and/or pelvic exam if performed.  General- NAD, alert and oriented, appropriate  Psych- Normal mood, good memory      External genitalia- Normal, no lesions  Urethra- Normal, no masses, non tender  Bladder- Normal, no masses, non tender  Vagina- NL no atrophy, no discharge     Cervix- LEEP/CKC bed healing well  Uterus- Normal size, shape & consistency.  Non tender, mobile.  Adnexa- No mass, non tender    Lymphatic- No palpable groin nodes  Extremities- No edema, no cyanosis   Skin- No lesions, no rashes    Tissue Pathology Exam (04/01/2025 11:23)    Op Note by Cheli Cho DO (04/01/2025 11:13)    ASSESSMENT and PLAN:  Post-operative exam    Diagnoses and all orders for this visit:    1. Postoperative follow-up (Primary)        Operative report, surgical findings and any pathology/photos reviewed.  All questions answered.     Counseling:   Ok to resume normal activities  Ok to resume intercourse  Return to school/work without limitations      Follow Up:  Return in about 6 months (around 11/8/2025) for repeat pap.            Cheli Cho DO  05/08/2025    [facility] [address] [place] [address]  Dept: [phone]  Dept Fax: [phone]  Loc: [phone]  Loc Fax: [phone]

## 2025-05-08 ENCOUNTER — OFFICE VISIT (OUTPATIENT)
Dept: OBSTETRICS AND GYNECOLOGY | Age: 30
End: 2025-05-08
Payer: COMMERCIAL

## 2025-05-08 VITALS
SYSTOLIC BLOOD PRESSURE: 117 MMHG | BODY MASS INDEX: 31.07 KG/M2 | WEIGHT: 181 LBS | DIASTOLIC BLOOD PRESSURE: 77 MMHG | HEART RATE: 98 BPM

## 2025-05-08 DIAGNOSIS — Z09 POSTOPERATIVE FOLLOW-UP: Primary | ICD-10-CM

## 2025-05-08 PROCEDURE — 99024 POSTOP FOLLOW-UP VISIT: CPT | Performed by: OBSTETRICS & GYNECOLOGY

## 2025-05-21 NOTE — PROGRESS NOTES
"Chief Complaint  Varicose Veins (Tired, itchy, heavy left leg, tingle)    Subjective      HPI: Raysa Ellis is a 29 y.o. female seen for evaluation of varicose veins with L calf tingling.    Objective   Vital Signs:  /80 (BP Location: Right arm)   Ht 162.6 cm (64.02\")   Wt 82.1 kg (181 lb)   BMI 31.05 kg/m²   Estimated body mass index is 31.05 kg/m² as calculated from the following:    Height as of this encounter: 162.6 cm (64.02\").    Weight as of this encounter: 82.1 kg (181 lb).        Raysa Ellis  reports that she quit smoking about 7 years ago. Her smoking use included cigarettes. She started smoking about 12 years ago. She has a 2.5 pack-year smoking history. She has been exposed to tobacco smoke. She has never used smokeless tobacco.. Tobacco Education/Cessation: Raysa Ellis  reports that she quit smoking about 7 years ago. Her smoking use included cigarettes. She started smoking about 12 years ago. She has a 2.5 pack-year smoking history. She has been exposed to tobacco smoke. She has never used smokeless tobacco. I have educated her on the risk of diseases from using tobacco products such as cancer, COPD, heart disease, and general health .    Exam: BMI 31.  Easily palpable pedal artery pulses bilaterally.  No varicose veins in the right lower extremity.  6 mm nontender, compressible varicose veins beginning at the left medial knee, extending into the medial calf.  Numerous spider veins adjacent to her varicose veins.  No lower extremity swelling or pitting edema.  No skin changes.    Personal review of data: My progress note 2/29/2024, describing painful varicose veins LLE post successful EVLA L GSV March 2023.       Assessment and Plan     Diagnoses and all orders for this visit:    1. Varicose veins of left lower extremity with pain (Primary)  -     Venous w Reflux Lower Extremity - Unilateral CAR; Future    Summary: 29-year-old woman with residual varicose veins of the left lower " extremity post successful laser ablation L GSV March 2023.  Presents with residual or recurrent varicose veins, still in the distribution of the GSV, with ongoing symptoms.  began to wear compression stockings about 6 months ago when her symptoms returned.  Because of the frequency and intensity of her symptoms she is interested in further evaluation and treatment.  Recommend lower extremity venous duplex scanning to evaluate for venous reflux and define management options.  I anticipate GSV tributary vein reflux.  The patient will return after imaging to discuss treatment options.  Questions answered.    Follow Up     Return for Follow-up after test.  Patient was given instructions and counseling regarding her condition or for health maintenance advice. Please see specific information pulled into the AVS if appropriate.

## 2025-05-22 ENCOUNTER — OFFICE VISIT (OUTPATIENT)
Age: 30
End: 2025-05-22
Payer: COMMERCIAL

## 2025-05-22 VITALS
DIASTOLIC BLOOD PRESSURE: 80 MMHG | HEIGHT: 64 IN | BODY MASS INDEX: 30.9 KG/M2 | WEIGHT: 181 LBS | SYSTOLIC BLOOD PRESSURE: 128 MMHG

## 2025-05-22 DIAGNOSIS — I83.812 VARICOSE VEINS OF LEFT LOWER EXTREMITY WITH PAIN: Primary | ICD-10-CM

## 2025-05-22 NOTE — PATIENT INSTRUCTIONS
Quitting Smoking    Quitting smoking is a physical and mental challenge. You may have cravings, withdrawal symptoms, and temptation to smoke. Before quitting, work with your primary care provider to make a plan that can help you manage quitting. Making a plan before you quit may keep you from smoking when you have the urge to smoke while trying to quit.    Managing stress  Stress can make you want to smoke, and wanting to smoke may cause stress. It is important to find ways to manage your stress other than smoking. You could try some of the following:  Practice relaxation techniques.  Breathe slowly and deeply, in through your nose and out through your mouth.  Listen to music.  Soak in a bath or take a shower.  Imagine a peaceful place or vacation.  Get some support.  Talk with family or friends about your stress.  Join a support group.  Talk with a counselor or therapist.  Get some physical activity.  Go for a walk, run, or bike ride.  Play a favorite sport.  Practice yoga.    Medicines  Talk with your primary care provider about medicines that might help you deal with cravings and make quitting easier for you.    Relationships  Social situations can be difficult when you are quitting smoking. To manage this, you can:  Avoid parties and other social situations where people might be smoking.  Avoid alcohol.  Leave right away if you have the urge to smoke.  Explain to your family and friends that you are quitting smoking. Ask for support and let them know you might be a bit grumpy.  Plan activities where smoking is not an option.    General instructions  Be aware that many people gain weight after they quit smoking. Not everyone does. To keep from gaining weight, have a plan in place before you quit, and stick to the plan after you quit. Your plan should include:  Eating healthy snacks. When you have a craving, it may help to:  Eat popcorn, or try carrots, celery, or other cut vegetables.  Chew sugar-free  gum.  Changing how you eat.  Eat small portion sizes at meals.  Eat 4-6 small meals throughout the day instead of 1-2 large meals a day.  Be mindful when you eat. You should avoid watching television or doing other things that might distract you as you eat.  Exercising regularly.  Make time to exercise each day. If you do not have time for a long workout, do short bouts of exercise for 5-10 minutes several times a day.  Do some form of strengthening exercise, such as weight lifting.  Do some exercise that gets your heart beating and causes you to breathe deeply, such as walking fast, running, swimming, or biking. This is very important.  Drinking plenty of water or other low-calorie or no-calorie drinks. Drink enough fluid to keep your urine pale yellow.    How to recognize withdrawal symptoms  Your body and mind may experience discomfort as you try to get used to not having nicotine in your system. These effects are called withdrawal symptoms. They may include:  Feeling hungrier than normal.  Having trouble concentrating.  Feeling irritable or restless.  Having trouble sleeping.  Feeling depressed.  Craving a cigarette.  These symptoms may surprise you, but they are normal to have when quitting smoking.    To manage withdrawal symptoms:  Avoid places, people, and activities that trigger your cravings.  Remember why you want to quit.  Get plenty of sleep.  Avoid coffee and other drinks that contain caffeine. These may worsen some of your symptoms.    How to manage cravings  Come up with a plan for how to deal with your cravings. The plan should include the following:  A definition of the specific situation you want to deal with.  An activity or action you will take to replace smoking.  A clear idea for how this action will help.  The name of someone who could help you with this.  Cravings usually last for 5-10 minutes. Consider taking the following actions to help you with your plan to deal with cravings:  Keep your  mouth busy.  Chew sugar-free gum.  Suck on hard candies or a straw.  Brush your teeth.  Keep your hands and body busy.  Change to a different activity right away.  Squeeze or play with a ball.  Do an activity or a hobby, such as making bead jewelry, practicing needlepoint, or working with wood.  Mix up your normal routine.  Take a short exercise break. Go for a quick walk, or run up and down stairs.  Focus on doing something kind or helpful for someone else.  Call a friend or family member to talk during a craving.  Join a support group.  Contact a quitline.    Find support  To get help or find a support group:  Call the [employer] [place]'s Smoking Quitline: 5-800QUITNOW (535-7069)  Text QUIT to 339694    For more information  Visit these websites to find more information on quitting smoking:  U.S. Department of Health and Human Services: www.smokefree.gov  American Lung Association: www.freedomfromsmoking.org  Centers for Disease Control and Prevention (CDC): www.cdc.gov  American Heart Association: www.heart.org  Contact a primary care provider if:  You want to change your plan for quitting.  The medicines you are taking are not helping.  Your eating feels out of control or you cannot sleep.  You feel depressed or become very anxious.    Summary  Quitting smoking is a physical and mental challenge. You will face cravings, withdrawal symptoms, and temptation to smoke again. Preparation can help you as you go through these challenges.  Try different techniques to manage stress, handle social situations, and prevent weight gain.  You can deal with cravings by keeping your mouth busy (such as by chewing gum), keeping your hands and body busy, calling family or friends, or contacting a quitline for people who want to quit smoking.  You can deal with withdrawal symptoms by avoiding places where people smoke, getting plenty of rest, and avoiding drinks that contain caffeine.    Elsevier Patient Education © 2024  [employer] Inc[employer]

## 2025-06-04 ENCOUNTER — APPOINTMENT (OUTPATIENT)
Dept: GENERAL RADIOLOGY | Facility: HOSPITAL | Age: 30
End: 2025-06-04
Payer: COMMERCIAL

## 2025-06-04 ENCOUNTER — HOSPITAL ENCOUNTER (EMERGENCY)
Facility: HOSPITAL | Age: 30
Discharge: HOME | End: 2025-06-04
Attending: EMERGENCY MEDICINE | Admitting: EMERGENCY MEDICINE
Payer: COMMERCIAL

## 2025-06-04 VITALS
TEMPERATURE: 99.4 F | WEIGHT: 164.02 LBS | RESPIRATION RATE: 18 BRPM | HEIGHT: 64 IN | DIASTOLIC BLOOD PRESSURE: 79 MMHG | SYSTOLIC BLOOD PRESSURE: 126 MMHG | BODY MASS INDEX: 28 KG/M2 | HEART RATE: 67 BPM | OXYGEN SATURATION: 97 %

## 2025-06-04 DIAGNOSIS — R00.2 PALPITATION: Primary | ICD-10-CM

## 2025-06-04 DIAGNOSIS — F41.1 ANXIETY REACTION: ICD-10-CM

## 2025-06-04 LAB
ALBUMIN SERPL-MCNC: 4.5 G/DL (ref 3.5–5.2)
ALBUMIN/GLOB SERPL: 1.7 G/DL
ALP SERPL-CCNC: 88 U/L (ref 39–117)
ALT SERPL W P-5'-P-CCNC: 25 U/L (ref 1–33)
AMPHET+METHAMPHET UR QL: NEGATIVE
AMPHETAMINES UR QL: NEGATIVE
ANION GAP SERPL CALCULATED.3IONS-SCNC: 13.5 MMOL/L (ref 5–15)
AST SERPL-CCNC: 17 U/L (ref 1–32)
BACTERIA UR QL AUTO: NORMAL /HPF
BARBITURATES UR QL SCN: NEGATIVE
BASOPHILS # BLD AUTO: 0.05 10*3/MM3 (ref 0–0.2)
BASOPHILS NFR BLD AUTO: 0.4 % (ref 0–1.5)
BENZODIAZ UR QL SCN: NEGATIVE
BILIRUB SERPL-MCNC: 0.5 MG/DL (ref 0–1.2)
BILIRUB UR QL STRIP: NEGATIVE
BUN SERPL-MCNC: 8 MG/DL (ref 6–20)
BUN/CREAT SERPL: 11.6 (ref 7–25)
BUPRENORPHINE SERPL-MCNC: NEGATIVE NG/ML
CALCIUM SPEC-SCNC: 9.2 MG/DL (ref 8.6–10.5)
CANNABINOIDS SERPL QL: POSITIVE
CHLORIDE SERPL-SCNC: 108 MMOL/L (ref 98–107)
CLARITY UR: CLEAR
CO2 SERPL-SCNC: 17.5 MMOL/L (ref 22–29)
COCAINE UR QL: NEGATIVE
COLOR UR: YELLOW
CREAT SERPL-MCNC: 0.69 MG/DL (ref 0.57–1)
DEPRECATED RDW RBC AUTO: 37.5 FL (ref 37–54)
EGFRCR SERPLBLD CKD-EPI 2021: 119.9 ML/MIN/1.73
EOSINOPHIL # BLD AUTO: 0.11 10*3/MM3 (ref 0–0.4)
EOSINOPHIL NFR BLD AUTO: 0.9 % (ref 0.3–6.2)
ERYTHROCYTE [DISTWIDTH] IN BLOOD BY AUTOMATED COUNT: 12.5 % (ref 12.3–15.4)
FENTANYL UR-MCNC: NEGATIVE NG/ML
GEN 5 1HR TROPONIN T REFLEX: <6 NG/L
GLOBULIN UR ELPH-MCNC: 2.7 GM/DL
GLUCOSE SERPL-MCNC: 113 MG/DL (ref 65–99)
GLUCOSE UR STRIP-MCNC: NEGATIVE MG/DL
HCG INTACT+B SERPL-ACNC: <0.5 MIU/ML
HCT VFR BLD AUTO: 40.9 % (ref 34–46.6)
HGB BLD-MCNC: 14.4 G/DL (ref 12–15.9)
HGB UR QL STRIP.AUTO: ABNORMAL
HOLD SPECIMEN: NORMAL
HOLD SPECIMEN: NORMAL
HYALINE CASTS UR QL AUTO: NORMAL /LPF
IMM GRANULOCYTES # BLD AUTO: 0.05 10*3/MM3 (ref 0–0.05)
IMM GRANULOCYTES NFR BLD AUTO: 0.4 % (ref 0–0.5)
KETONES UR QL STRIP: NEGATIVE
LEUKOCYTE ESTERASE UR QL STRIP.AUTO: NEGATIVE
LYMPHOCYTES # BLD AUTO: 1.3 10*3/MM3 (ref 0.7–3.1)
LYMPHOCYTES NFR BLD AUTO: 10.7 % (ref 19.6–45.3)
MAGNESIUM SERPL-MCNC: 1.9 MG/DL (ref 1.6–2.6)
MCH RBC QN AUTO: 29.1 PG (ref 26.6–33)
MCHC RBC AUTO-ENTMCNC: 35.2 G/DL (ref 31.5–35.7)
MCV RBC AUTO: 82.8 FL (ref 79–97)
METHADONE UR QL SCN: NEGATIVE
MONOCYTES # BLD AUTO: 0.57 10*3/MM3 (ref 0.1–0.9)
MONOCYTES NFR BLD AUTO: 4.7 % (ref 5–12)
NEUTROPHILS NFR BLD AUTO: 10.08 10*3/MM3 (ref 1.7–7)
NEUTROPHILS NFR BLD AUTO: 82.9 % (ref 42.7–76)
NITRITE UR QL STRIP: NEGATIVE
NRBC BLD AUTO-RTO: 0 /100 WBC (ref 0–0.2)
OPIATES UR QL: NEGATIVE
OXYCODONE UR QL SCN: NEGATIVE
PCP UR QL SCN: NEGATIVE
PH UR STRIP.AUTO: 6.5 [PH] (ref 5–8)
PLATELET # BLD AUTO: 241 10*3/MM3 (ref 140–450)
PMV BLD AUTO: 10.1 FL (ref 6–12)
POTASSIUM SERPL-SCNC: 3.5 MMOL/L (ref 3.5–5.2)
PROT SERPL-MCNC: 7.2 G/DL (ref 6–8.5)
PROT UR QL STRIP: NEGATIVE
RBC # BLD AUTO: 4.94 10*6/MM3 (ref 3.77–5.28)
RBC # UR STRIP: NORMAL /HPF
REF LAB TEST METHOD: NORMAL
SODIUM SERPL-SCNC: 139 MMOL/L (ref 136–145)
SP GR UR STRIP: 1.01 (ref 1–1.03)
SQUAMOUS #/AREA URNS HPF: NORMAL /HPF
TRICYCLICS UR QL SCN: NEGATIVE
TROPONIN T NUMERIC DELTA: NORMAL
TROPONIN T SERPL HS-MCNC: <6 NG/L
TSH SERPL DL<=0.05 MIU/L-ACNC: 0.94 UIU/ML (ref 0.27–4.2)
UROBILINOGEN UR QL STRIP: ABNORMAL
WBC # UR STRIP: NORMAL /HPF
WBC NRBC COR # BLD AUTO: 12.16 10*3/MM3 (ref 3.4–10.8)
WHOLE BLOOD HOLD COAG: NORMAL
WHOLE BLOOD HOLD SPECIMEN: NORMAL

## 2025-06-04 PROCEDURE — 84484 ASSAY OF TROPONIN QUANT: CPT

## 2025-06-04 PROCEDURE — 25010000002 ONDANSETRON PER 1 MG

## 2025-06-04 PROCEDURE — 93010 ELECTROCARDIOGRAM REPORT: CPT | Performed by: INTERNAL MEDICINE

## 2025-06-04 PROCEDURE — 84443 ASSAY THYROID STIM HORMONE: CPT

## 2025-06-04 PROCEDURE — 71045 X-RAY EXAM CHEST 1 VIEW: CPT

## 2025-06-04 PROCEDURE — 25810000003 SODIUM CHLORIDE 0.9 % SOLUTION: Performed by: EMERGENCY MEDICINE

## 2025-06-04 PROCEDURE — 83735 ASSAY OF MAGNESIUM: CPT

## 2025-06-04 PROCEDURE — 36415 COLL VENOUS BLD VENIPUNCTURE: CPT

## 2025-06-04 PROCEDURE — 81001 URINALYSIS AUTO W/SCOPE: CPT | Performed by: NURSE PRACTITIONER

## 2025-06-04 PROCEDURE — 80053 COMPREHEN METABOLIC PANEL: CPT

## 2025-06-04 PROCEDURE — 84484 ASSAY OF TROPONIN QUANT: CPT | Performed by: EMERGENCY MEDICINE

## 2025-06-04 PROCEDURE — 93005 ELECTROCARDIOGRAM TRACING: CPT | Performed by: EMERGENCY MEDICINE

## 2025-06-04 PROCEDURE — 80307 DRUG TEST PRSMV CHEM ANLYZR: CPT | Performed by: NURSE PRACTITIONER

## 2025-06-04 PROCEDURE — 93005 ELECTROCARDIOGRAM TRACING: CPT

## 2025-06-04 PROCEDURE — 96374 THER/PROPH/DIAG INJ IV PUSH: CPT

## 2025-06-04 PROCEDURE — 99284 EMERGENCY DEPT VISIT MOD MDM: CPT

## 2025-06-04 PROCEDURE — 84702 CHORIONIC GONADOTROPIN TEST: CPT | Performed by: NURSE PRACTITIONER

## 2025-06-04 PROCEDURE — 85025 COMPLETE CBC W/AUTO DIFF WBC: CPT

## 2025-06-04 RX ORDER — ONDANSETRON 2 MG/ML
4 INJECTION INTRAMUSCULAR; INTRAVENOUS ONCE
Status: COMPLETED | OUTPATIENT
Start: 2025-06-04 | End: 2025-06-04

## 2025-06-04 RX ORDER — SODIUM CHLORIDE 0.9 % (FLUSH) 0.9 %
10 SYRINGE (ML) INJECTION AS NEEDED
Status: DISCONTINUED | OUTPATIENT
Start: 2025-06-04 | End: 2025-06-05 | Stop reason: HOSPADM

## 2025-06-04 RX ADMIN — SODIUM CHLORIDE 1000 ML: 9 INJECTION, SOLUTION INTRAVENOUS at 21:55

## 2025-06-04 RX ADMIN — ONDANSETRON 4 MG: 2 INJECTION INTRAMUSCULAR; INTRAVENOUS at 20:59

## 2025-06-05 ENCOUNTER — TELEPHONE (OUTPATIENT)
Dept: CARDIOLOGY | Facility: CLINIC | Age: 30
End: 2025-06-05

## 2025-06-05 LAB
QT INTERVAL: 376 MS
QTC INTERVAL: 406 MS

## 2025-06-05 NOTE — TELEPHONE ENCOUNTER
Caller: Raysa Ellis    Relationship to patient: Self    Best call back number: [phone]     Chief complaint: CHEST PAIN / CHEST TIGHTNESS FOR ABOUT 3 MONTHS     Type of visit: FOLLOW UP    Requested date: ASAP    If rescheduling, when is the original appointment: 9.5.25    Additional notes: PATIENT WAS SEEN IN ED YESTERDAY FOR CHEST PAIN AND TIGHTNESS. PATIENT WAS TOLD TO FOLLOW UP WITH CARDIOLOGY. PLEASE CALL PATIENT TO SCHEDULE. THANK YOU!

## 2025-06-05 NOTE — ED PROVIDER NOTES
Time: 9:18 PM EDT  Date of encounter:  2025  Independent Historian/Clinical History and Information was obtained by:   Patient    History is limited by: N/A    Chief Complaint: Rapid heart rate      History of Present Illness:  Patient is a 29 y.o. year old female who presents to the emergency department for evaluation of rapid heart rate    Patient states that she has had rapid heart rate and SVT in the past and follows with cardiology.  States she takes propranolol 3 times a day and had her first 2 doses earlier today.  She was seated outside with her daughter playing in the yard she was drinking water during this episode and felt as though her heart began to race.  She felt mildly tremulous and anxious during this episode.  She felt mildly lightheaded.  She took her third dose of propranolol and her symptoms seem to improve.  She states she is back at baseline now only has mild discomfort in her left chest.  States she is due to follow-up with her cardiologist in September.  She denies any missed doses of her medication or any changes in her medication.  She denies any stimulant use.      Patient Care Team  Primary Care Provider: Louise Yeager PA-C    Past Medical History:     No Known Allergies  Past Medical History:   Diagnosis Date    Abnormal Pap smear of cervix     Asymptomatic varicose veins of both lower extremities 10/07/2021    Current severe episode of major depressive disorder without psychotic features without prior episode 02/10/2022    Delivery by emergency  10/08/2019    Ectopic pregnancy     Generalized anxiety disorder 02/10/2022    HPV (human papilloma virus) infection     Migraine headache 10/29/2021    Multiple gestation     Nevus, non-neoplastic 2022    Other specified soft tissue disorders 2022    Rapid heart rate     follows with Dr. KAPADIA,    S/P excision of ganglion cyst 2017    Spider veins     Varicose veins of unspecified lower extremity with pain  07/15/2020    Venous insufficiency (chronic) (peripheral) 07/15/2020    NO CURRENT ISSUES     Past Surgical History:   Procedure Laterality Date    CERVICAL CONIZATION N/A 2025    Procedure: CERVICAL CONIZATION;  Surgeon: Cheli Cho DO;  Location: [facility] [place];  Service: Gynecology;  Laterality: N/A;     SECTION  2019    COLPOSCOPY  2024    ENDOVENOUS ABLATION SAPHENOUS VEIN W/ LASER  2023    SALPINGECTOMY Left 2020    R/T ECTOPIC PREG    WRIST GANGLION EXCISION Left 2018     Family History   Problem Relation Age of Onset    Asthma Mother     Sleep apnea Mother     Hypertension Maternal Grandmother     Breast cancer Neg Hx     Uterine cancer Neg Hx     Ovarian cancer Neg Hx     Colon cancer Neg Hx     Pulmonary embolism Neg Hx     Deep vein thrombosis Neg Hx     Melanoma Neg Hx     Prostate cancer Neg Hx     Malig Hyperthermia Neg Hx        Home Medications:  Prior to Admission medications    Medication Sig Start Date End Date Taking? Authorizing Provider   cyclobenzaprine (FLEXERIL) 5 MG tablet Take 1 tablet by mouth 3 (Three) Times a Day As Needed for Muscle Spasms. May take 1/2-1 full tablet 3 times daily as needed  Patient taking differently: Take 1 tablet by mouth As Needed for Muscle Spasms. May take 1/2-1 full tablet 3 times daily as needed 25  Yes Rufus Aguilera APRN   norethindrone-ethinyl estradiol FE (Junel FE ) 1-20 MG-MCG per tablet Take 1 tablet by mouth Daily. 24 Yes Leroy Bradford APRN   propranolol (INDERAL) 10 MG tablet Take 1 tablet by mouth 3 (Three) Times a Day.  Patient taking differently: Take 1 tablet by mouth 2 (Two) Times a Day. TAKES AT 12 AND 6 24  Yes No Zhang APRN        Social History:   Social History     Tobacco Use    Smoking status: Former     Current packs/day: 0.00     Average packs/day: 0.5 packs/day for 5.0 years (2.5 ttl pk-yrs)     Types: Cigarettes     Start date: 2013     Quit date:   "    Years since quittin.4     Passive exposure: Past    Smokeless tobacco: Never   Vaping Use    Vaping status: Some Days    Substances: Flavoring   Substance Use Topics    Alcohol use: No    Drug use: No         Review of Systems:  Review of Systems   Constitutional:  Negative for chills and fever.   HENT:  Negative for congestion, ear pain and sore throat.    Eyes:  Negative for pain.   Respiratory:  Negative for cough, chest tightness and shortness of breath.    Cardiovascular:  Positive for palpitations. Negative for chest pain.   Gastrointestinal:  Negative for abdominal pain, diarrhea, nausea and vomiting.   Genitourinary:  Negative for flank pain and hematuria.   Musculoskeletal:  Negative for joint swelling.   Skin:  Negative for pallor.   Neurological:  Positive for light-headedness. Negative for seizures and headaches.   Psychiatric/Behavioral:  The patient is nervous/anxious.    All other systems reviewed and are negative.       Physical Exam:  /79   Pulse 67   Temp 99.4 °F (37.4 °C) (Oral)   Resp 18   Ht 162.6 cm (64\")   Wt 74.4 kg (164 lb 0.4 oz)   LMP 2025 (Exact Date)   SpO2 97%   BMI 28.15 kg/m²     Physical Exam  Vitals and nursing note reviewed.   Constitutional:       General: She is not in acute distress.     Appearance: Normal appearance. She is not toxic-appearing.   HENT:      Head: Normocephalic and atraumatic.      Jaw: There is normal jaw occlusion.   Eyes:      General: Lids are normal.      Extraocular Movements: Extraocular movements intact.      Conjunctiva/sclera: Conjunctivae normal.      Pupils: Pupils are equal, round, and reactive to light.   Cardiovascular:      Rate and Rhythm: Normal rate and regular rhythm.      Pulses: Normal pulses.      Heart sounds: Normal heart sounds.   Pulmonary:      Effort: Pulmonary effort is normal. No respiratory distress.      Breath sounds: Normal breath sounds. No wheezing or rhonchi.   Abdominal:      General: Abdomen is " flat.      Palpations: Abdomen is soft.      Tenderness: There is no abdominal tenderness. There is no guarding or rebound.   Musculoskeletal:         General: Normal range of motion.      Cervical back: Normal range of motion and neck supple.      Right lower leg: No edema.      Left lower leg: No edema.   Skin:     General: Skin is warm and dry.   Neurological:      Mental Status: She is alert and oriented to person, place, and time. Mental status is at baseline.   Psychiatric:         Mood and Affect: Mood normal.                  Medical Decision Making:      Comorbidities that affect care:    History of ectopic pregnancy, migraine, depression, anxiety, history of elevated heart rate, current vape use    External Notes reviewed:    Previous Clinic Note: Outpatient vascular surgery visit for varicose veins 5/22/2025      The following orders were placed and all results were independently analyzed by me:  Orders Placed This Encounter   Procedures    XR Chest 1 View    Theresa Draw    Comprehensive Metabolic Panel    Magnesium    High Sensitivity Troponin T    TSH Rfx On Abnormal To Free T4    CBC Auto Differential    hCG, Quantitative, Pregnancy    Urinalysis With Microscopic If Indicated (No Culture) - Urine, Clean Catch    Urine Drug Screen - Urine, Clean Catch    High Sensitivity Troponin T 1Hr    Fentanyl, Urine - Urine, Clean Catch    Urinalysis, Microscopic Only - Urine, Clean Catch    Ambulatory Referral to Cardiology for Arrythmia    Continuous Pulse Oximetry    ECG 12 Lead ED Triage Standing Order; Dysrhythmia    CBC & Differential    Green Top (Gel)    Lavender Top    Gold Top - SST    Light Blue Top       Medications Given in the Emergency Department:  Medications   ondansetron (ZOFRAN) injection 4 mg (4 mg Intravenous Given 6/4/25 2059)   sodium chloride 0.9 % bolus 1,000 mL (0 mL Intravenous Stopped 6/4/25 2327)        ED Course:    ED Course as of 06/06/25 0158   Wed Jun 04, 2025 2120 My  interpretation of EKG: Sinus arrhythmia 70, no acute ischemia, [JS]   5957 I discussed the patient's positive THC screen with her.  She states she occasionally uses it as a vape.  States she is attempted to use it for anxiety she does not believe it is directly related to her rapid heart rate but she will consider that. [JS]      ED Course User Index  [JS] Luis Meyer MD       Labs:    Lab Results (last 24 hours)       ** No results found for the last 24 hours. **             Imaging:    No Radiology Exams Resulted Within Past 24 Hours      Differential Diagnosis and Discussion:    Palpitations: Differential diagnosis includes but is not limited to anxiety, atrioventricular blocks, mitral valve disease, hypoxia, coronary artery disease, hypokalemia, anemia, fever, COPD, congestive heart failure, pericarditis, Georgina-Parkinson-White syndrome, pulmonary embolism, SVT, atrial fibrillation, atrial flutter, sinus tachycardia, thyrotoxicosis, and pheochromocytoma.    PROCEDURES:    Labs were collected in the emergency department and all labs were reviewed and interpreted by me.  X-ray were performed in the emergency department and all X-ray impressions were independently interpreted by me.  An EKG was performed and the EKG was interpreted by me.    ECG 12 Lead ED Triage Standing Order; Dysrhythmia   Final Result   HEART RATE=70  bpm   RR Ltlvytvq=344  ms   NH Enycemim=533  ms   P Horizontal Axis=-11  deg   P Front Axis=24  deg   QRSD Interval=82  ms   QT Kzjfdjhc=763  ms   PSeE=050  ms   QRS Axis=64  deg   T Wave Axis=10  deg   - OTHERWISE NORMAL ECG -   Sinus arrhythmia   When compared with ECG of 08-May-2024 20:51:33,   Significant rate decrease   Electronically Signed By: Anselmo Navarrete ([facility]) 2025-06-05 15:04:57   Date and Time of Study:2025-06-04 20:11:38          Procedures    MDM     Amount and/or Complexity of Data Reviewed  Clinical lab tests: reviewed  Tests in the medicine section of CPT®: reviewed                        Patient Care Considerations:    NARCOTICS: I considered prescribing opiate pain medication as an outpatient, however no pain control in the emergency department.      Consultants/Shared Management Plan:    None    Social Determinants of Health:    Patient is independent, reliable, and has access to care.       Disposition and Care Coordination:    Discharged: The patient is suitable and stable for discharge with no need for consideration of admission.    I have explained the patient´s condition, diagnoses and treatment plan based on the information available to me at this time. I have answered questions and addressed any concerns. The patient has a good  understanding of the patient´s diagnosis, condition, and treatment plan as can be expected at this point. The vital signs have been stable. The patient´s condition is stable and appropriate for discharge from the emergency department.      The patient will pursue further outpatient evaluation with the primary care physician or other designated or consulting physician as outlined in the discharge instructions. They are agreeable to this plan of care and follow-up instructions have been explained in detail. The patient has received these instructions in written format and has expressed an understanding of the discharge instructions. The patient is aware that any significant change in condition or worsening of symptoms should prompt an immediate return to this or the closest emergency department or call to 911.  I have explained discharge medications and the need for follow up with the patient/caretakers. This was also printed in the discharge instructions. Patient was discharged with the following medications and follow up:      Medication List        Changed      cyclobenzaprine 5 MG tablet  Commonly known as: FLEXERIL  Take 1 tablet by mouth 3 (Three) Times a Day As Needed for Muscle Spasms. May take 1/2-1 full tablet 3 times daily as needed  What  changed: when to take this     propranolol 10 MG tablet  Commonly known as: INDERAL  Take 1 tablet by mouth 3 (Three) Times a Day.  What changed:   when to take this  additional instructions           Louise Yeager PA-C  [address] [id number] [phone]    Schedule an appointment as soon as possible for a visit          Final diagnoses:   Palpitation   Anxiety reaction        ED Disposition       ED Disposition   Discharge    Condition   Stable    Comment   --               This medical record created using voice recognition software.             Luis Meyer MD  06/06/25 0158

## 2025-06-09 ENCOUNTER — OFFICE VISIT (OUTPATIENT)
Dept: CARDIOLOGY | Facility: CLINIC | Age: 30
End: 2025-06-09
Payer: COMMERCIAL

## 2025-06-09 VITALS
DIASTOLIC BLOOD PRESSURE: 82 MMHG | WEIGHT: 169 LBS | SYSTOLIC BLOOD PRESSURE: 115 MMHG | BODY MASS INDEX: 28.85 KG/M2 | HEART RATE: 87 BPM | HEIGHT: 64 IN

## 2025-06-09 DIAGNOSIS — F41.9 ANXIETY: ICD-10-CM

## 2025-06-09 DIAGNOSIS — R00.2 PALPITATIONS: ICD-10-CM

## 2025-06-09 DIAGNOSIS — R07.9 CHEST PAIN, UNSPECIFIED TYPE: Primary | ICD-10-CM

## 2025-06-09 PROCEDURE — 99214 OFFICE O/P EST MOD 30 MIN: CPT

## 2025-06-09 PROCEDURE — 1159F MED LIST DOCD IN RCRD: CPT

## 2025-06-09 PROCEDURE — 1160F RVW MEDS BY RX/DR IN RCRD: CPT

## 2025-06-09 RX ORDER — NITROGLYCERIN 0.4 MG/1
0.4 TABLET SUBLINGUAL
OUTPATIENT
Start: 2025-06-09 | End: 2025-06-09

## 2025-06-09 RX ORDER — METOPROLOL TARTRATE 50 MG
50 TABLET ORAL
OUTPATIENT
Start: 2025-06-09

## 2025-06-09 RX ORDER — METOPROLOL TARTRATE 25 MG/1
200 TABLET, FILM COATED ORAL ONCE
OUTPATIENT
Start: 2025-06-09 | End: 2025-06-09

## 2025-06-09 RX ORDER — METOPROLOL TARTRATE 25 MG/1
100 TABLET, FILM COATED ORAL ONCE
OUTPATIENT
Start: 2025-06-09

## 2025-06-09 RX ORDER — SODIUM CHLORIDE 9 MG/ML
40 INJECTION, SOLUTION INTRAVENOUS AS NEEDED
OUTPATIENT
Start: 2025-06-09

## 2025-06-09 RX ORDER — METOPROLOL TARTRATE 100 MG/1
TABLET ORAL
Qty: 2 TABLET | Refills: 0 | Status: SHIPPED | OUTPATIENT
Start: 2025-06-09

## 2025-06-09 RX ORDER — HYDROXYZINE HYDROCHLORIDE 10 MG/1
5-10 TABLET, FILM COATED ORAL 3 TIMES DAILY PRN
Qty: 90 TABLET | Refills: 3 | Status: SHIPPED | OUTPATIENT
Start: 2025-06-09

## 2025-06-09 RX ORDER — SODIUM CHLORIDE 0.9 % (FLUSH) 0.9 %
10 SYRINGE (ML) INJECTION AS NEEDED
OUTPATIENT
Start: 2025-06-09

## 2025-06-09 RX ORDER — NITROGLYCERIN 0.4 MG/1
0.8 TABLET SUBLINGUAL
OUTPATIENT
Start: 2025-06-09

## 2025-06-09 RX ORDER — METOPROLOL TARTRATE 25 MG/1
150 TABLET, FILM COATED ORAL ONCE
OUTPATIENT
Start: 2025-06-09

## 2025-06-09 RX ORDER — METOPROLOL TARTRATE 1 MG/ML
5 INJECTION, SOLUTION INTRAVENOUS
OUTPATIENT
Start: 2025-06-09

## 2025-06-09 RX ORDER — METOPROLOL TARTRATE 25 MG/1
50 TABLET, FILM COATED ORAL ONCE
OUTPATIENT
Start: 2025-06-09

## 2025-06-09 RX ORDER — SODIUM CHLORIDE 0.9 % (FLUSH) 0.9 %
10 SYRINGE (ML) INJECTION EVERY 12 HOURS SCHEDULED
OUTPATIENT
Start: 2025-06-09

## 2025-06-09 NOTE — ASSESSMENT & PLAN NOTE
Patient's palpitations are ongoing.  Previous Holter monitor was unremarkable.  Propranolol does not seem to be helping.  I recommend she stop this.

## 2025-06-09 NOTE — PROGRESS NOTES
Chief Complaint  Chest Pain (F/U)    Subjective        History of Present Illness  Raysa Ellis presents to [facility] CARDIOLOGY for follow up.   Patient is a 29-year-old female with past medical history outlined below, significant for palpitations and anxiety who presents for follow-up.  She recently presented to the ER with chest discomfort.  She reports ongoing chest discomfort that comes and goes throughout the day.  It can happen at rest or with exertion.  She really has no dyspnea.  She continues to note palpitations.  Initially she had improvement with propranolol but reports her palpitations have returned.  They can last anywhere from 30 minutes to an hour.  They resolve on their own with no intervention.  She has no edema, syncope.  She does note occasional episodes of dizziness which she attributes to the propranolol.    Past Medical History:   Diagnosis Date    Abnormal Pap smear of cervix     Asymptomatic varicose veins of both lower extremities 10/07/2021    Current severe episode of major depressive disorder without psychotic features without prior episode 02/10/2022    Delivery by emergency  10/08/2019    Ectopic pregnancy     Generalized anxiety disorder 02/10/2022    HPV (human papilloma virus) infection     Migraine headache 10/29/2021    Multiple gestation     Nevus, non-neoplastic 2022    Other specified soft tissue disorders 2022    Rapid heart rate     follows with Dr. KAPADIA,    S/P excision of ganglion cyst     Spider veins     Varicose veins of unspecified lower extremity with pain 07/15/2020    Venous insufficiency (chronic) (peripheral) 07/15/2020    NO CURRENT ISSUES       ALLERGY  No Known Allergies     Past Surgical History:   Procedure Laterality Date    CERVICAL CONIZATION N/A 2025    Procedure: CERVICAL CONIZATION;  Surgeon: Cheli Cho DO;  [place] [facility] [place] [facility] [place]  Service: Gynecology;  Laterality: N/A;     SECTION       COLPOSCOPY  2024    ENDOVENOUS ABLATION SAPHENOUS VEIN W/ LASER  2023    SALPINGECTOMY Left 2020    R/T ECTOPIC PREG    WRIST GANGLION EXCISION Left 2018        Social History     Socioeconomic History    Marital status: Single    Number of children: 1    Years of education: 11    Highest education level: 11th grade   Tobacco Use    Smoking status: Former     Current packs/day: 0.00     Average packs/day: 0.5 packs/day for 5.0 years (2.5 ttl pk-yrs)     Types: Cigarettes     Start date: 2013     Quit date:      Years since quittin.4     Passive exposure: Past    Smokeless tobacco: Never   Vaping Use    Vaping status: Some Days    Substances: Flavoring   Substance and Sexual Activity    Alcohol use: No    Drug use: No    Sexual activity: Yes     Partners: Male     Birth control/protection: Birth control pill       Family History   Problem Relation Age of Onset    Asthma Mother     Sleep apnea Mother     Hypertension Maternal Grandmother     Heart failure Other     Breast cancer Neg Hx     Uterine cancer Neg Hx     Ovarian cancer Neg Hx     Colon cancer Neg Hx     Pulmonary embolism Neg Hx     Deep vein thrombosis Neg Hx     Melanoma Neg Hx     Prostate cancer Neg Hx     Malig Hyperthermia Neg Hx         Current Outpatient Medications on File Prior to Visit   Medication Sig    cyclobenzaprine (FLEXERIL) 5 MG tablet Take 1 tablet by mouth 3 (Three) Times a Day As Needed for Muscle Spasms. May take 1/2-1 full tablet 3 times daily as needed (Patient taking differently: Take 1 tablet by mouth As Needed for Muscle Spasms. May take 1/2-1 full tablet 3 times daily as needed)    norethindrone-ethinyl estradiol FE (Junel FE ) 1-20 MG-MCG per tablet Take 1 tablet by mouth Daily.    propranolol (INDERAL) 10 MG tablet Take 1 tablet by mouth 3 (Three) Times a Day. (Patient taking differently: Take 1 tablet by mouth 2 (Two) Times a Day. TAKES AT 12 AND 6)     No current facility-administered  "medications on file prior to visit.       Objective   Vitals:    06/09/25 1503   BP: 115/82   BP Location: Right arm   Patient Position: Sitting   Cuff Size: Large Adult   Pulse: 87   Weight: 76.7 kg (169 lb)   Height: 162.6 cm (64\")       Physical Exam  Constitutional:       General: She is awake. She is not in acute distress.     Appearance: Normal appearance.   HENT:      Head: Normocephalic.      Nose: Nose normal. No congestion.   Eyes:      Extraocular Movements: Extraocular movements intact.      Conjunctiva/sclera: Conjunctivae normal.      Pupils: Pupils are equal, round, and reactive to light.   Neck:      Thyroid: No thyromegaly.      Vascular: No JVD.   Cardiovascular:      Rate and Rhythm: Normal rate and regular rhythm.      Chest Wall: PMI is not displaced.      Pulses: Normal pulses.      Heart sounds: Normal heart sounds, S1 normal and S2 normal. No murmur heard.     No friction rub. No gallop. No S3 or S4 sounds.   Pulmonary:      Effort: Pulmonary effort is normal.      Breath sounds: Normal breath sounds. No wheezing, rhonchi or rales.   Abdominal:      General: Bowel sounds are normal.      Palpations: Abdomen is soft.      Tenderness: There is no abdominal tenderness.   Musculoskeletal:      Cervical back: No tenderness.      Right lower leg: No edema.      Left lower leg: No edema.   Lymphadenopathy:      Cervical: No cervical adenopathy.   Skin:     General: Skin is warm and dry.      Capillary Refill: Capillary refill takes less than 2 seconds.      Coloration: Skin is not cyanotic.      Findings: No petechiae or rash.      Nails: There is no clubbing.   Neurological:      Mental Status: She is alert.   Psychiatric:         Mood and Affect: Mood normal.         Behavior: Behavior is cooperative.           Result Review     The following data was reviewed by ALBARO Small on 06/09/25.      CMP          2/10/2025    13:57 6/4/2025    20:18   CMP   Glucose 91  113    BUN 9  8.0  "   Creatinine 0.59  0.69    EGFR 124.5  119.9    Sodium 136  139    Potassium 4.2  3.5    Chloride 103  108    Calcium 9.5  9.2    Total Protein 7.0  7.2    Albumin 4.4  4.5    Globulin 2.6  2.7    Total Bilirubin 0.6  0.5    Alkaline Phosphatase 102  88    AST (SGOT) 21  17    ALT (SGPT) 22  25    Albumin/Globulin Ratio 1.7  1.7    BUN/Creatinine Ratio 15.3  11.6    Anion Gap 9.0  13.5      CBC w/diff          2/10/2025    13:57 4/1/2025    10:11 6/4/2025    20:18   CBC w/Diff   WBC 6.11  5.96  12.16    RBC 5.16  5.23  4.94    Hemoglobin 15.6  15.4  14.4    Hematocrit 44.6  43.5  40.9    MCV 86.4  83.2  82.8    MCH 30.2  29.4  29.1    MCHC 35.0  35.4  35.2    RDW 12.4  12.2  12.5    Platelets 204  203  241    Neutrophil Rel % 64.0  65.6  82.9    Immature Granulocyte Rel % 0.3  0.2  0.4    Lymphocyte Rel % 26.4  25.3  10.7    Monocyte Rel % 6.5  5.7  4.7    Eosinophil Rel % 2.3  2.7  0.9    Basophil Rel % 0.5  0.5  0.4       Lipid Panel          2/10/2025    13:57   Lipid Panel   Total Cholesterol 182    Triglycerides 62    HDL Cholesterol 41    VLDL Cholesterol 12    LDL Cholesterol  129    LDL/HDL Ratio 3.14            === Results for orders placed in visit on 06/20/24 ===    HOLTER MONITOR - 72 HOUR UP TO 15 DAY    - Interpretation Summary -    Patient was monitored for 2 days, 17 hours and 52 minutes.    Lowest heart rate is 50 bpm, average heart was 84 bpm, maximum heart rate was 150 bpm.    Rare PVC.  No PACs.    The patient activated events which corresponded to normal heart rhythm (normal sinus rhythm and sinus tachycardia).    No abnormal heart rhythms noted.          Procedures      Assessment & Plan  Chest pain, unspecified type  Patient's chest pain is atypical.  Recommend cardiac CTA for further evaluation.  Orders placed.  Palpitations  Patient's palpitations are ongoing.  Previous Holter monitor was unremarkable.  Propranolol does not seem to be helping.  I recommend she stop this.  Anxiety  Patient  became tearful and emotional during her visit when asking about anxiety.  She does feel this may be the source of a lot of her symptoms.  She suffered the loss of her baby at 2 months old a couple of years ago and she is concerned with Takotsubo cardiomyopathy surrounding this.  She was provided emotional support and reassurance.  I do feel some of her symptoms may be related to anxiety.  Recommend hydroxyzine 5- 10 mg 3 times daily as needed for anxiety.  Cardiac CTA to be done as discussed above.                     Follow Up   Return in about 8 weeks (around 8/4/2025) for With ALBARO Lambert.    Patient was given instructions and counseling regarding her condition or for health maintenance advice. Please see specific information pulled into the AVS if appropriate.     ALBARO Small  06/09/25  15:31 EDT    Dictated Utilizing Dragon Dictation

## 2025-06-10 ENCOUNTER — OFFICE VISIT (OUTPATIENT)
Dept: INTERNAL MEDICINE | Facility: CLINIC | Age: 30
End: 2025-06-10
Payer: COMMERCIAL

## 2025-06-10 VITALS
HEART RATE: 80 BPM | RESPIRATION RATE: 16 BRPM | WEIGHT: 170.8 LBS | OXYGEN SATURATION: 98 % | TEMPERATURE: 97.9 F | DIASTOLIC BLOOD PRESSURE: 64 MMHG | SYSTOLIC BLOOD PRESSURE: 110 MMHG | HEIGHT: 64 IN | BODY MASS INDEX: 29.16 KG/M2

## 2025-06-10 DIAGNOSIS — F43.21 GRIEF AT LOSS OF CHILD: ICD-10-CM

## 2025-06-10 DIAGNOSIS — F41.9 ANXIETY: Primary | ICD-10-CM

## 2025-06-10 DIAGNOSIS — Z63.4 GRIEF AT LOSS OF CHILD: ICD-10-CM

## 2025-06-10 RX ORDER — BUSPIRONE HYDROCHLORIDE 5 MG/1
5 TABLET ORAL 3 TIMES DAILY PRN
Qty: 60 TABLET | Refills: 1 | Status: SHIPPED | OUTPATIENT
Start: 2025-06-10

## 2025-06-10 SDOH — SOCIAL STABILITY - SOCIAL INSECURITY: DISSAPEARANCE AND DEATH OF FAMILY MEMBER: Z63.4

## 2025-06-10 NOTE — PROGRESS NOTES
"Chief Complaint  ER follow up (Was seen in ED on 6/4/25 for palpitations. Blood pressure was elevated at ED, they believe it could be due to a panic attack. )    Subjective          Raysa Ellis presents to [facility]    Palpitations- Patient was evaluated in the ED with Luis Meyer MD (06/04/2025) for random palpitations.  She has a history of SVT and takes propanolol TID.  Basic blood work relatively normal other than positive THC. Patient was evaluated yesterday by Cardiology where chest pain was diagnosed as atypical and cardiac CTA was ordered and recommended to stop propanolol.  Anxiety was discussed during that visit as well and recommended she take hydroxyzine 5-10mg TID.  She is interested in starting another medication for anxiety.  She has been on pristiq but had brain fog, Trintellix, Celexa, Lexapro.  No manic episodes.     Objective   Vital Signs:   /64 (BP Location: Right arm, Patient Position: Sitting, Cuff Size: Adult)   Pulse 80   Temp 97.9 °F (36.6 °C) (Temporal)   Resp 16   Ht 162.6 cm (64\")   Wt 77.5 kg (170 lb 12.8 oz)   SpO2 98%   BMI 29.32 kg/m²     Physical Exam  Vitals reviewed.   Constitutional:       Appearance: Normal appearance. She is well-developed.   HENT:      Head: Normocephalic and atraumatic.   Eyes:      Conjunctiva/sclera: Conjunctivae normal.      Pupils: Pupils are equal, round, and reactive to light.   Cardiovascular:      Rate and Rhythm: Normal rate and regular rhythm.      Heart sounds: No murmur heard.     No friction rub. No gallop.   Pulmonary:      Effort: Pulmonary effort is normal.      Breath sounds: Normal breath sounds. No wheezing or rhonchi.   Skin:     General: Skin is warm and dry.   Neurological:      Mental Status: She is alert and oriented to person, place, and time.      Cranial Nerves: No cranial nerve deficit.   Psychiatric:         Mood and Affect: Mood and affect normal.         " Behavior: Behavior normal.         Thought Content: Thought content normal.         Judgment: Judgment normal.        Result Review :          Procedures      Assessment and Plan    Diagnoses and all orders for this visit:    1. Anxiety (Primary)  Assessment & Plan:  Patient has tried and failed several SSRI/SNRI in the past.  I will go ahead and send her to psychiatry for further evaluation and treatment.  I will prescribe buspar to use as needed until psychiatry appointment but discussed that there are other more appropriate longer acting anxiety medications that patient could benefit from.  Discussed black box warning of suicidal ideations or intrusive thoughts when starting the medication.  Patient to discontinue medication and contact our office as soon as possible if this happens.  Would recommend counseling as well, handout to local counselors given to patient.  Call for any questions or concerns.      Orders:  -     Ambulatory Referral to Psychiatry    2. Grief at loss of child  -     Ambulatory Referral to Psychiatry    Other orders  -     busPIRone (BUSPAR) 5 MG tablet; Take 1 tablet by mouth 3 (Three) Times a Day As Needed (anxiety) for up to 60 doses.  Dispense: 60 tablet; Refill: 1              Follow Up   No follow-ups on file.  Patient was given instructions and counseling regarding her condition or for health maintenance advice. Please see specific information pulled into the AVS if appropriate.

## 2025-06-10 NOTE — ASSESSMENT & PLAN NOTE
Patient has tried and failed several SSRI/SNRI in the past.  I will go ahead and send her to psychiatry for further evaluation and treatment.  I will prescribe buspar to use as needed until psychiatry appointment but discussed that there are other more appropriate longer acting anxiety medications that patient could benefit from.  Discussed black box warning of suicidal ideations or intrusive thoughts when starting the medication.  Patient to discontinue medication and contact our office as soon as possible if this happens.  Would recommend counseling as well, handout to local counselors given to patient.  Call for any questions or concerns.

## 2025-07-28 RX ORDER — BUSPIRONE HYDROCHLORIDE 5 MG/1
5 TABLET ORAL 3 TIMES DAILY PRN
Qty: 60 TABLET | Refills: 0 | Status: SHIPPED | OUTPATIENT
Start: 2025-07-28

## 2025-08-25 RX ORDER — BUSPIRONE HYDROCHLORIDE 5 MG/1
5 TABLET ORAL 3 TIMES DAILY PRN
Qty: 60 TABLET | Refills: 0 | Status: SHIPPED | OUTPATIENT
Start: 2025-08-25

## 2025-08-26 ENCOUNTER — OFFICE VISIT (OUTPATIENT)
Dept: BEHAVIORAL HEALTH | Facility: CLINIC | Age: 30
End: 2025-08-26
Payer: COMMERCIAL

## 2025-08-26 ENCOUNTER — CLINICAL SUPPORT (OUTPATIENT)
Dept: INTERNAL MEDICINE | Facility: CLINIC | Age: 30
End: 2025-08-26
Payer: COMMERCIAL

## 2025-08-26 VITALS
WEIGHT: 181 LBS | HEART RATE: 90 BPM | BODY MASS INDEX: 30.9 KG/M2 | HEIGHT: 64 IN | DIASTOLIC BLOOD PRESSURE: 67 MMHG | SYSTOLIC BLOOD PRESSURE: 112 MMHG

## 2025-08-26 DIAGNOSIS — F41.9 ANXIETY: Primary | ICD-10-CM

## 2025-08-26 DIAGNOSIS — Z63.4 GRIEF AT LOSS OF CHILD: ICD-10-CM

## 2025-08-26 DIAGNOSIS — F33.1 MAJOR DEPRESSIVE DISORDER, RECURRENT EPISODE, MODERATE DEGREE: ICD-10-CM

## 2025-08-26 DIAGNOSIS — F41.0 PANIC ATTACKS: Primary | ICD-10-CM

## 2025-08-26 DIAGNOSIS — F19.90 ILLICIT DRUG USE: ICD-10-CM

## 2025-08-26 DIAGNOSIS — Z79.899 ENCOUNTER FOR LONG-TERM (CURRENT) USE OF HIGH-RISK MEDICATION: ICD-10-CM

## 2025-08-26 DIAGNOSIS — F41.1 GENERALIZED ANXIETY DISORDER: ICD-10-CM

## 2025-08-26 DIAGNOSIS — Z84.82 FAMILY HISTORY OF SIDS (SUDDEN INFANT DEATH SYNDROME): ICD-10-CM

## 2025-08-26 DIAGNOSIS — Z79.899 MEDICATION MANAGEMENT: ICD-10-CM

## 2025-08-26 DIAGNOSIS — Z13.31 SCREENING FOR DEPRESSION: ICD-10-CM

## 2025-08-26 DIAGNOSIS — F43.21 GRIEF AT LOSS OF CHILD: ICD-10-CM

## 2025-08-26 LAB
AMPHET+METHAMPHET UR QL: NEGATIVE
AMPHETAMINE INTERNAL CONTROL: NORMAL
AMPHETAMINES UR QL: NEGATIVE
BARBITURATE INTERNAL CONTROL: NORMAL
BARBITURATES UR QL SCN: NEGATIVE
BENZODIAZ UR QL SCN: NEGATIVE
BENZODIAZEPINE INTERNAL CONTROL: NORMAL
BUPRENORPHINE INTERNAL CONTROL: NORMAL
BUPRENORPHINE SERPL-MCNC: NEGATIVE NG/ML
CANNABINOIDS SERPL QL: NEGATIVE
COCAINE INTERNAL CONTROL: NORMAL
COCAINE UR QL: NEGATIVE
EXPIRATION DATE: NORMAL
Lab: NORMAL
MDMA (ECSTASY) INTERNAL CONTROL: NORMAL
MDMA UR QL SCN: NEGATIVE
METHADONE INTERNAL CONTROL: NORMAL
METHADONE UR QL SCN: NEGATIVE
METHAMPHETAMINE INTERNAL CONTROL: NORMAL
MORPHINE INTERNAL CONTROL: NORMAL
MORPHINE/OPIATES SCREEN, URINE: NEGATIVE
OXYCODONE INTERNAL CONTROL: NORMAL
OXYCODONE UR QL SCN: NEGATIVE
PCP UR QL SCN: NEGATIVE
PHENCYCLIDINE INTERNAL CONTROL: NORMAL
THC INTERNAL CONTROL: NORMAL

## 2025-08-26 PROCEDURE — 80305 DRUG TEST PRSMV DIR OPT OBS: CPT | Performed by: INTERNAL MEDICINE

## 2025-08-26 RX ORDER — FLUOXETINE 10 MG/1
10 CAPSULE ORAL DAILY
Qty: 30 CAPSULE | Refills: 2 | Status: SHIPPED | OUTPATIENT
Start: 2025-08-26 | End: 2026-08-26

## 2025-08-26 SDOH — SOCIAL STABILITY - SOCIAL INSECURITY: DISSAPEARANCE AND DEATH OF FAMILY MEMBER: Z63.4

## 2025-08-27 RX ORDER — ALPRAZOLAM 0.25 MG
0.25 TABLET ORAL DAILY PRN
Qty: 15 TABLET | Refills: 0 | Status: SHIPPED | OUTPATIENT
Start: 2025-08-27 | End: 2026-08-27

## (undated) DEVICE — GLV SURG BIOGEL LTX PF 6 1/2

## (undated) DEVICE — SUT SILK 2/0 FS BLK 18IN 685G

## (undated) DEVICE — SYR CONTRL LUERLOK 10CC

## (undated) DEVICE — PAD,SANITARY,11 IN,MAXI,N-STRL,IND WRAP: Brand: MEDLINE

## (undated) DEVICE — ADAPT TBG PREFILTER 3/8TO1.33IN NS

## (undated) DEVICE — DRAPE,UNDERBUTTOCKS,PCH,STERILE: Brand: MEDLINE

## (undated) DEVICE — DRSNG TELFA PAD NONADH STR 1S 3X4IN

## (undated) DEVICE — BLADE, TONGUE, 6", STERILE: Brand: MEDLINE

## (undated) DEVICE — ANTIBACTERIAL VIOLET BRAIDED (POLYGLACTIN 910), SYNTHETIC ABSORBABLE SUTURE: Brand: COATED VICRYL

## (undated) DEVICE — INTENDED FOR TISSUE SEPARATION, AND OTHER PROCEDURES THAT REQUIRE A SHARP SURGICAL BLADE TO PUNCTURE OR CUT.: Brand: BARD-PARKER ® CARBON RIB-BACK BLADES

## (undated) DEVICE — TUBING, SUCTION, 1/4" X 10', STRAIGHT: Brand: MEDLINE

## (undated) DEVICE — PREFLTR SMOKE/EVAC SURGIFRESH PROTECTION PLS 1 1/3IN

## (undated) DEVICE — CATH URETH INTRMIT ALLPURP LTX 16F RED

## (undated) DEVICE — PROCTO SWABS: Brand: DEROYAL

## (undated) DEVICE — VAGINAL PREP TRAY: Brand: MEDLINE INDUSTRIES, INC.

## (undated) DEVICE — MINOR-LF: Brand: MEDLINE INDUSTRIES, INC.

## (undated) DEVICE — COAGULATOR SXN FTSWTCH 10F6IN

## (undated) DEVICE — NEEDLE,18GX1.5",REG,BEVEL: Brand: MEDLINE

## (undated) DEVICE — PACK,LITHOTOMY,PK I: Brand: MEDLINE

## (undated) DEVICE — STRAP STIRUP SLP RNG 19X3.5IN DISP

## (undated) DEVICE — SLV SCD KN/LEN ADJ EXPRSS BLENDED MD 1P/U

## (undated) DEVICE — SYR LL TP 10ML STRL

## (undated) DEVICE — SUT PDS 3/0 SH 27IN DYED Z316H

## (undated) DEVICE — SOL LUGOLS